# Patient Record
Sex: FEMALE | Race: WHITE | HISPANIC OR LATINO | Employment: OTHER | URBAN - METROPOLITAN AREA
[De-identification: names, ages, dates, MRNs, and addresses within clinical notes are randomized per-mention and may not be internally consistent; named-entity substitution may affect disease eponyms.]

---

## 2020-08-05 ENCOUNTER — OFFICE VISIT (OUTPATIENT)
Dept: FAMILY MEDICINE CLINIC | Facility: CLINIC | Age: 45
End: 2020-08-05

## 2020-08-05 VITALS
OXYGEN SATURATION: 98 % | DIASTOLIC BLOOD PRESSURE: 82 MMHG | HEART RATE: 63 BPM | TEMPERATURE: 98 F | WEIGHT: 207 LBS | SYSTOLIC BLOOD PRESSURE: 140 MMHG | BODY MASS INDEX: 34.49 KG/M2 | HEIGHT: 65 IN

## 2020-08-05 DIAGNOSIS — Z78.0 POSTMENOPAUSAL: Primary | ICD-10-CM

## 2020-08-05 DIAGNOSIS — Z12.31 VISIT FOR SCREENING MAMMOGRAM: ICD-10-CM

## 2020-08-05 DIAGNOSIS — E55.9 VITAMIN D DEFICIENCY: ICD-10-CM

## 2020-08-05 DIAGNOSIS — L71.9 ROSACEA: ICD-10-CM

## 2020-08-05 DIAGNOSIS — N30.30 TRIGONITIS: ICD-10-CM

## 2020-08-05 DIAGNOSIS — L85.3 DRY SKIN DERMATITIS: ICD-10-CM

## 2020-08-05 PROCEDURE — 99204 OFFICE O/P NEW MOD 45 MIN: CPT | Performed by: FAMILY MEDICINE

## 2020-08-05 RX ORDER — CLOTRIMAZOLE AND BETAMETHASONE DIPROPIONATE 10; .64 MG/G; MG/G
CREAM TOPICAL 2 TIMES DAILY
Qty: 15 G | Refills: 5
Start: 2020-08-05 | End: 2021-03-26 | Stop reason: HOSPADM

## 2020-08-05 NOTE — PROGRESS NOTES
Subjective   Ernestina Urrutia is a 45 y.o. female.     Chief Complaint   Patient presents with   • Establish Care       History of Present Illness   Was found to have itching on her face and redness and saw derm and given a steroid cream and has to use prn and doing well.     Vit d def- has taken replacement.     Postmenopausal-patient has had a total hysterectomy.  Has had a recent mammogram that was okay but will be due again in 2 months.  Is taking medication and her symptoms are well controlled.    Trigonitis- has seen urology and not bothering her.  Cystoscopy was okay.    The following portions of the patient's history were reviewed and updated as appropriate: allergies, current medications, past family history, past medical history, past social history, past surgical history and problem list.    No past medical history on file.    Past Surgical History:   Procedure Laterality Date   • BILATERAL OOPHORECTOMY     • CHOLECYSTECTOMY     • CYSTOSCOPY     • HYSTERECTOMY         Family History   Problem Relation Age of Onset   • Heart disease Mother    • Hypertension Mother    • Hyperlipidemia Mother        Social History     Socioeconomic History   • Marital status:      Spouse name: Not on file   • Number of children: Not on file   • Years of education: Not on file   • Highest education level: Not on file   Tobacco Use   • Smoking status: Never Smoker   • Smokeless tobacco: Never Used       Review of Systems   Constitutional: Negative for fatigue and fever.   HENT: Negative for ear pain and hearing loss.    Eyes: Negative for pain and visual disturbance.   Respiratory: Negative for chest tightness and shortness of breath.    Cardiovascular: Negative for chest pain and palpitations.   Gastrointestinal: Negative for constipation and diarrhea.   Genitourinary: Negative for dysuria and urinary incontinence.   Musculoskeletal: Negative for arthralgias and myalgias.   Skin: Negative for rash and skin lesions.  "  Neurological: Negative for headache and memory problem.   Psychiatric/Behavioral: Negative for depressed mood. The patient is not nervous/anxious.        Objective   Visit Vitals  /82   Pulse 63   Temp 98 °F (36.7 °C) (Temporal)   Ht 165.1 cm (65\")   Wt 93.9 kg (207 lb)   SpO2 98%   BMI 34.45 kg/m²     Body mass index is 34.45 kg/m².  Physical Exam   Constitutional: She is oriented to person, place, and time. She appears well-developed. No distress.   HENT:   Head: Normocephalic and atraumatic.   Eyes: Pupils are equal, round, and reactive to light. Conjunctivae are normal.   Neck: Normal range of motion. Neck supple.   Cardiovascular: Normal rate, regular rhythm, normal heart sounds and intact distal pulses.   Pulmonary/Chest: Effort normal and breath sounds normal.   Abdominal: Soft. Bowel sounds are normal.   Musculoskeletal: Normal range of motion. She exhibits no edema.   Neurological: She is alert and oriented to person, place, and time.   Skin: Skin is warm and dry. No rash noted.   Psychiatric: She has a normal mood and affect. Her behavior is normal.         Assessment/Plan   Ernestina was seen today for establish care.    Diagnoses and all orders for this visit:    Postmenopausal  -     Estradiol (Estrogel) 0.75 MG/1.25 GM (0.06%) topical gel; Place 1.25 g on the skin as directed by provider Daily.  -     progesterone (PROMETRIUM) 100 MG capsule; Take 1 capsule by mouth Daily.    Visit for screening mammogram  -     Cancel: Mammo Screening Bilateral With CAD; Future  -     Mammo Screening Bilateral With CAD; Future    Trigonitis    Dry skin dermatitis    Rosacea  -     clotrimazole-betamethasone (Lotrisone) 1-0.05 % cream; Apply  topically to the appropriate area as directed 2 (Two) Times a Day.    Vitamin D deficiency        Doing well, cont meds, f/u in 6-12 months.        "

## 2020-08-06 ENCOUNTER — TELEPHONE (OUTPATIENT)
Dept: FAMILY MEDICINE CLINIC | Facility: CLINIC | Age: 45
End: 2020-08-06

## 2020-08-06 NOTE — TELEPHONE ENCOUNTER
Pat w/ [facility] in [place] called and stated they received a rx on yesterday that did not have a signature and need additional refills. The medication is the Estradiol (Estrogel) 0.75 MG/1.25 GM (0.06%) topical gel.    Pat can be reached at the following number [phone] ext. [phone].

## 2020-08-07 DIAGNOSIS — Z78.0 POSTMENOPAUSAL: ICD-10-CM

## 2020-11-03 ENCOUNTER — HOSPITAL ENCOUNTER (OUTPATIENT)
Dept: MAMMOGRAPHY | Facility: HOSPITAL | Age: 45
Discharge: HOME | End: 2020-11-03
Admitting: FAMILY MEDICINE

## 2020-11-03 DIAGNOSIS — Z12.31 VISIT FOR SCREENING MAMMOGRAM: ICD-10-CM

## 2020-11-03 PROCEDURE — 77067 SCR MAMMO BI INCL CAD: CPT

## 2020-11-03 PROCEDURE — 77063 BREAST TOMOSYNTHESIS BI: CPT

## 2020-11-09 ENCOUNTER — TELEPHONE (OUTPATIENT)
Dept: FAMILY MEDICINE CLINIC | Facility: CLINIC | Age: 45
End: 2020-11-09

## 2020-11-09 NOTE — TELEPHONE ENCOUNTER
PT CALLED STATING SHE NOTICED SOME DISCHARGE FROM HER LEFT BREAST THIS MORNING. SHE STATES SHE HAS NEVER EXPERIENCED THIS BEFORE. SHE STATES THERE WAS ALSO A LITTLE BIT OF ITCHING.     SHE HAD A MAMMOGRAM LAST WEEK THAT CAME BACK NORMAL, BUT SHE WANTS TO KNOW IF SHE SHOULD BE CONCERNED ABOUT THIS, AND IF SHE NEEDS AN APPOINTMENT.    PLEASE ADVISE.    CALLBACK NUMBER: [phone]

## 2020-11-17 ENCOUNTER — OFFICE VISIT (OUTPATIENT)
Dept: FAMILY MEDICINE CLINIC | Facility: CLINIC | Age: 45
End: 2020-11-17

## 2020-11-17 VITALS
BODY MASS INDEX: 35.02 KG/M2 | TEMPERATURE: 97.5 F | DIASTOLIC BLOOD PRESSURE: 70 MMHG | SYSTOLIC BLOOD PRESSURE: 118 MMHG | HEART RATE: 75 BPM | OXYGEN SATURATION: 98 % | HEIGHT: 65 IN | WEIGHT: 210.2 LBS

## 2020-11-17 DIAGNOSIS — N64.52 DISCHARGE FROM LEFT NIPPLE: Primary | ICD-10-CM

## 2020-11-17 PROCEDURE — 99213 OFFICE O/P EST LOW 20 MIN: CPT | Performed by: NURSE PRACTITIONER

## 2020-11-17 NOTE — PROGRESS NOTES
"Michel Urrutia is a 45 y.o. female.     Chief Complaint   Patient presents with   • nipple discharge from left breast on 11/09/20 with itching     This is my first time seeing this patient.   Breast Problem  This is a new problem. The current episode started 1 to 4 weeks ago. Pertinent negatives include no fatigue, fever or rash. Nothing aggravates the symptoms. She has tried nothing for the symptoms.   Patient had normal screening mammogram on 11/3/2020. Patient has had total hysterectomy and is currently taking  Estradiol and progesterone.       The following portions of the patient's history were reviewed and updated as appropriate: allergies, current medications, past family history, past medical history, past social history, past surgical history and problem list.    History reviewed. No pertinent past medical history.    Past Surgical History:   Procedure Laterality Date   • BILATERAL OOPHORECTOMY     • CHOLECYSTECTOMY     • CYSTOSCOPY     • HYSTERECTOMY         Family History   Problem Relation Age of Onset   • Heart disease Mother    • Hypertension Mother    • Hyperlipidemia Mother        Social History     Socioeconomic History   • Marital status:      Spouse name: Not on file   • Number of children: Not on file   • Years of education: Not on file   • Highest education level: Not on file   Tobacco Use   • Smoking status: Never Smoker   • Smokeless tobacco: Never Used   Substance and Sexual Activity   • Alcohol use: Never     Frequency: Never   • Drug use: Never       Review of Systems   Constitutional: Negative for fatigue and fever.   Genitourinary: Positive for breast discharge. Negative for breast lump and breast pain.   Skin: Negative for rash.       Objective   Vitals:    11/17/20 1311   BP: 118/70   Pulse: 75   Temp: 97.5 °F (36.4 °C)   TempSrc: Temporal   SpO2: 98%   Weight: 95.3 kg (210 lb 3.2 oz)   Height: 165.1 cm (65\")      Body mass index is 34.98 kg/m².  Physical " Exam  Vitals signs and nursing note reviewed.   Constitutional:       Appearance: Normal appearance.   Cardiovascular:      Rate and Rhythm: Normal rate and regular rhythm.   Pulmonary:      Effort: Pulmonary effort is normal.      Breath sounds: Normal breath sounds.   Chest:      Breasts:         Right: No mass or nipple discharge.         Left: No mass or nipple discharge.   Neurological:      Mental Status: She is alert and oriented to person, place, and time.   Psychiatric:         Mood and Affect: Mood normal.           Assessment/Plan   Diagnoses and all orders for this visit:    1. Discharge from left nipple (Primary)  -     Prolactin  -     TSH Rfx On Abnormal To Free T4  -     US Breast Left Limited; Future  -     Basic metabolic panel

## 2020-11-18 LAB
BUN SERPL-MCNC: 10 MG/DL (ref 6–20)
BUN/CREAT SERPL: 15.9 (ref 7–25)
CALCIUM SERPL-MCNC: 9.6 MG/DL (ref 8.6–10.5)
CHLORIDE SERPL-SCNC: 103 MMOL/L (ref 98–107)
CO2 SERPL-SCNC: 28.1 MMOL/L (ref 22–29)
CREAT SERPL-MCNC: 0.63 MG/DL (ref 0.57–1)
GLUCOSE SERPL-MCNC: 84 MG/DL (ref 65–99)
POTASSIUM SERPL-SCNC: 4 MMOL/L (ref 3.5–5.2)
PROLACTIN SERPL-MCNC: 8.5 NG/ML (ref 4.8–23.3)
SODIUM SERPL-SCNC: 141 MMOL/L (ref 136–145)
TSH SERPL DL<=0.005 MIU/L-ACNC: 1.12 UIU/ML (ref 0.27–4.2)

## 2020-11-23 ENCOUNTER — TELEPHONE (OUTPATIENT)
Dept: FAMILY MEDICINE CLINIC | Facility: CLINIC | Age: 45
End: 2020-11-23

## 2020-11-23 NOTE — TELEPHONE ENCOUNTER
Caller: Ernestina Urrutia    Relationship: Self    Best call back number: [phone]     Caller requesting test results: covid-19 antibody test     What test was performed: covid-19 antibody test     When was the test performed: 11/17/2020    Where was the test performed: our office     Additional notes: n/a

## 2020-12-09 ENCOUNTER — OFFICE VISIT (OUTPATIENT)
Dept: FAMILY MEDICINE CLINIC | Facility: CLINIC | Age: 45
End: 2020-12-09

## 2020-12-09 VITALS
WEIGHT: 207 LBS | DIASTOLIC BLOOD PRESSURE: 80 MMHG | OXYGEN SATURATION: 99 % | HEIGHT: 65 IN | SYSTOLIC BLOOD PRESSURE: 110 MMHG | BODY MASS INDEX: 34.49 KG/M2 | HEART RATE: 74 BPM | TEMPERATURE: 98 F

## 2020-12-09 DIAGNOSIS — J34.89 INTERNAL NASAL LESION: ICD-10-CM

## 2020-12-09 DIAGNOSIS — Z11.59 ENCOUNTER FOR HEPATITIS C SCREENING TEST FOR LOW RISK PATIENT: ICD-10-CM

## 2020-12-09 DIAGNOSIS — L71.9 ROSACEA: ICD-10-CM

## 2020-12-09 DIAGNOSIS — E55.9 VITAMIN D DEFICIENCY: ICD-10-CM

## 2020-12-09 DIAGNOSIS — Z00.00 HEALTH CARE MAINTENANCE: Primary | ICD-10-CM

## 2020-12-09 DIAGNOSIS — Z78.0 POSTMENOPAUSAL: ICD-10-CM

## 2020-12-09 PROCEDURE — 90715 TDAP VACCINE 7 YRS/> IM: CPT | Performed by: FAMILY MEDICINE

## 2020-12-09 PROCEDURE — 90471 IMMUNIZATION ADMIN: CPT | Performed by: FAMILY MEDICINE

## 2020-12-09 PROCEDURE — 99396 PREV VISIT EST AGE 40-64: CPT | Performed by: FAMILY MEDICINE

## 2020-12-09 RX ORDER — ESTRADIOL 0.75 MG/1.25G
2.5 GEL, METERED TOPICAL DAILY
Qty: 1 BOTTLE | Refills: 5 | Status: SHIPPED | OUTPATIENT
Start: 2020-12-09 | End: 2021-03-04

## 2020-12-09 NOTE — PROGRESS NOTES
"Ernestina Urrutia is here today for an annual physical exam.     Pt has a nasal scab for months and not resolving.     Rosacea- cream helps    Eating a healthy diet. Exercising routinely.   hysterectomy. Wears seat belt. Feels safe at home.   Sexual activity: no  Pregnancy: 0  Sexual and gender orientation: heterosexual female    PHQ-2 Depression Screening  Little interest or pleasure in doing things? 0   Feeling down, depressed, or hopeless? 0   PHQ-2 Total Score 0         I have reviewed the patient's medical, family, and social history in detail and updated the computerized patient record.    Screening history:  Colonoscopy -not yet due  Mammogram-had mammogram this year and is pending ultrasound after she noted left nipple discharge.  Labs are normal.  Pap/pelvic - no longer needed  Metabolic -due    Health Maintenance   Topic Date Due   • COLONOSCOPY  1975   • ANNUAL PHYSICAL  04/17/1978   • TDAP/TD VACCINES (1 - Tdap) 04/17/1994   • HEPATITIS C SCREENING  07/29/2020   • INFLUENZA VACCINE  08/01/2020   • MAMMOGRAM  11/03/2022   • Pneumococcal Vaccine 0-64  Aged Out   • PAP SMEAR  Discontinued       Review of Systems   Constitutional: Negative for fever.   HENT: Negative for hearing loss.    Eyes: Negative for visual disturbance.   Respiratory: Negative for shortness of breath.    Cardiovascular: Negative for chest pain.   Gastrointestinal: Negative for constipation and diarrhea.   Genitourinary: Negative for difficulty urinating.   Musculoskeletal: Negative for arthralgias and myalgias.   Skin: Negative for rash.   Hematological: Does not bruise/bleed easily.   Psychiatric/Behavioral: Negative for dysphoric mood.       /80 (BP Location: Left arm, Patient Position: Sitting, Cuff Size: Adult)   Pulse 74   Temp 98 °F (36.7 °C) (Temporal)   Ht 165.1 cm (65\")   Wt 93.9 kg (207 lb)   SpO2 99%   BMI 34.45 kg/m²      Physical Exam    Vital signs reviewed.  General appearance: No acute distress  Eyes: " conjunctiva clear without erythema; pupils equally round and reactive  ENT: external ears and nose normal; hearing normal, oropharynx clear  Neck: supple; no thyromegaly  CV: normal rate and rhythm; no peripheral edema  Respiratory: normal respiratory effort; lungs clear to auscultation bilaterally  MSK: normal gait and station; no focal joint deformity or swelling  Skin: no rash or wounds; normal turgor  Neuro: cranial nerves 2-12 grossly intact; normal sensation to light touch  Psych: mood and affect normal; recent and remote memory intact    No visits with results within 2 Week(s) from this visit.   Latest known visit with results is:   Office Visit on 11/17/2020   Component Date Value Ref Range Status   • Prolactin 11/17/2020 8.5  4.8 - 23.3 ng/mL Final   • TSH 11/17/2020 1.120  0.270 - 4.200 uIU/mL Final   • Glucose 11/17/2020 84  65 - 99 mg/dL Final   • BUN 11/17/2020 10  6 - 20 mg/dL Final   • Creatinine 11/17/2020 0.63  0.57 - 1.00 mg/dL Final   • eGFR Non African Am 11/17/2020 102  >60 mL/min/1.73 Final   • eGFR African Am 11/17/2020 124  >60 mL/min/1.73 Final   • BUN/Creatinine Ratio 11/17/2020 15.9  7.0 - 25.0 Final   • Sodium 11/17/2020 141  136 - 145 mmol/L Final   • Potassium 11/17/2020 4.0  3.5 - 5.2 mmol/L Final   • Chloride 11/17/2020 103  98 - 107 mmol/L Final   • Total CO2 11/17/2020 28.1  22.0 - 29.0 mmol/L Final   • Calcium 11/17/2020 9.6  8.6 - 10.5 mg/dL Final         Current Outpatient Medications:   •  clotrimazole-betamethasone (Lotrisone) 1-0.05 % cream, Apply  topically to the appropriate area as directed 2 (Two) Times a Day., Disp: 15 g, Rfl: 5  •  Estradiol (Estrogel) 0.75 MG/1.25 GM (0.06%) topical gel, Place 2.5 g on the skin as directed by provider Daily., Disp: 1 bottle, Rfl: 5  •  progesterone (PROMETRIUM) 100 MG capsule, Take 1 capsule by mouth Daily., Disp: 90 capsule, Rfl: 3    Diagnoses and all orders for this visit:    1. Health care maintenance (Primary)  -     Lipid  Panel    2. Encounter for hepatitis C screening test for low risk patient  -     Hepatitis C Antibody    3. Postmenopausal  -     Estradiol (Estrogel) 0.75 MG/1.25 GM (0.06%) topical gel; Place 2.5 g on the skin as directed by provider Daily.  Dispense: 1 bottle; Refill: 5    4. Internal nasal lesion  -     Ambulatory Referral to ENT (Otolaryngology)    5. Rosacea    Other orders  -     Cancel: Pap IG, Rfx HPV All Pth  -     Tdap Vaccine Greater Than or Equal To 8yo IM        Encourage healthy diet and exercise.  Encourage patient to stay up to date on screening examinations as indicated based on age and risk factors. F/U yearly.

## 2020-12-10 LAB
25(OH)D3+25(OH)D2 SERPL-MCNC: 23.1 NG/ML (ref 30–100)
CHOLEST SERPL-MCNC: 219 MG/DL (ref 0–200)
HCV AB S/CO SERPL IA: <0.1 S/CO RATIO (ref 0–0.9)
HDLC SERPL-MCNC: 49 MG/DL (ref 40–60)
LDLC SERPL CALC-MCNC: 147 MG/DL (ref 0–100)
TRIGL SERPL-MCNC: 126 MG/DL (ref 0–150)
VLDLC SERPL CALC-MCNC: 23 MG/DL (ref 5–40)

## 2020-12-11 ENCOUNTER — TELEPHONE (OUTPATIENT)
Dept: FAMILY MEDICINE CLINIC | Facility: CLINIC | Age: 45
End: 2020-12-11

## 2020-12-11 RX ORDER — ERGOCALCIFEROL 1.25 MG/1
50000 CAPSULE ORAL WEEKLY
Qty: 8 CAPSULE | Refills: 0 | Status: SHIPPED | OUTPATIENT
Start: 2020-12-11 | End: 2021-03-15

## 2020-12-11 NOTE — TELEPHONE ENCOUNTER
----- Message from Cherelle Schumacher MD sent at 12/11/2020  9:36 AM EST -----  Cholesterol is high.  Please work on diet and exercise and follow-up in 6 months.  We will recheck it and if it is still high we will need to start medication.  Your vitamin D is a little bit low.  I sent in an 8-week course of replacement to your local pharmacy.  After you are finished with this go to taking an over-the-counter vitamin D supplement of 1000 international units daily.

## 2020-12-15 ENCOUNTER — HOSPITAL ENCOUNTER (OUTPATIENT)
Dept: ULTRASOUND IMAGING | Facility: HOSPITAL | Age: 45
Discharge: HOME | End: 2020-12-15

## 2020-12-15 ENCOUNTER — HOSPITAL ENCOUNTER (OUTPATIENT)
Dept: MAMMOGRAPHY | Facility: HOSPITAL | Age: 45
Discharge: HOME | End: 2020-12-15

## 2020-12-15 DIAGNOSIS — N64.52 DISCHARGE FROM LEFT NIPPLE: ICD-10-CM

## 2020-12-15 PROCEDURE — 77065 DX MAMMO INCL CAD UNI: CPT

## 2020-12-15 PROCEDURE — G0279 TOMOSYNTHESIS, MAMMO: HCPCS

## 2020-12-15 PROCEDURE — 76641 ULTRASOUND BREAST COMPLETE: CPT

## 2020-12-16 ENCOUNTER — TELEPHONE (OUTPATIENT)
Dept: FAMILY MEDICINE CLINIC | Facility: CLINIC | Age: 45
End: 2020-12-16

## 2020-12-16 DIAGNOSIS — R92.8 ABNORMAL ULTRASOUND OF BREAST: ICD-10-CM

## 2020-12-16 DIAGNOSIS — N64.52 DISCHARGE FROM LEFT NIPPLE: Primary | ICD-10-CM

## 2020-12-16 NOTE — TELEPHONE ENCOUNTER
Will need to evaluate left breast further with US guided core needle biopsy.    Patient aware of results and recommendations.

## 2021-01-05 ENCOUNTER — HOSPITAL ENCOUNTER (OUTPATIENT)
Dept: MAMMOGRAPHY | Facility: HOSPITAL | Age: 46
Discharge: HOME | End: 2021-01-05

## 2021-01-05 ENCOUNTER — HOSPITAL ENCOUNTER (OUTPATIENT)
Dept: ULTRASOUND IMAGING | Facility: HOSPITAL | Age: 46
Discharge: HOME | End: 2021-01-05

## 2021-01-05 VITALS
BODY MASS INDEX: 34.49 KG/M2 | TEMPERATURE: 97.3 F | WEIGHT: 207 LBS | DIASTOLIC BLOOD PRESSURE: 79 MMHG | HEART RATE: 69 BPM | SYSTOLIC BLOOD PRESSURE: 131 MMHG | HEIGHT: 65 IN | RESPIRATION RATE: 16 BRPM | OXYGEN SATURATION: 100 %

## 2021-01-05 DIAGNOSIS — N64.52 DISCHARGE FROM LEFT NIPPLE: ICD-10-CM

## 2021-01-05 DIAGNOSIS — R92.8 ABNORMAL ULTRASOUND OF BREAST: ICD-10-CM

## 2021-01-05 DIAGNOSIS — Z98.890 STATUS POST BIOPSY: ICD-10-CM

## 2021-01-05 PROCEDURE — 25010000003 LIDOCAINE 1 % SOLUTION: Performed by: RADIOLOGY

## 2021-01-05 PROCEDURE — 77065 DX MAMMO INCL CAD UNI: CPT

## 2021-01-05 PROCEDURE — A4648 IMPLANTABLE TISSUE MARKER: HCPCS

## 2021-01-05 PROCEDURE — 88342 IMHCHEM/IMCYTCHM 1ST ANTB: CPT | Performed by: NURSE PRACTITIONER

## 2021-01-05 PROCEDURE — 88360 TUMOR IMMUNOHISTOCHEM/MANUAL: CPT | Performed by: NURSE PRACTITIONER

## 2021-01-05 PROCEDURE — 88305 TISSUE EXAM BY PATHOLOGIST: CPT | Performed by: NURSE PRACTITIONER

## 2021-01-05 RX ORDER — LIDOCAINE HYDROCHLORIDE AND EPINEPHRINE 10; 10 MG/ML; UG/ML
10 INJECTION, SOLUTION INFILTRATION; PERINEURAL ONCE
Status: COMPLETED | OUTPATIENT
Start: 2021-01-05 | End: 2021-01-05

## 2021-01-05 RX ORDER — LIDOCAINE HYDROCHLORIDE 10 MG/ML
10 INJECTION, SOLUTION INFILTRATION; PERINEURAL ONCE
Status: COMPLETED | OUTPATIENT
Start: 2021-01-05 | End: 2021-01-05

## 2021-01-05 RX ADMIN — LIDOCAINE HYDROCHLORIDE,EPINEPHRINE BITARTRATE 5 ML: 10; .01 INJECTION, SOLUTION INFILTRATION; PERINEURAL at 16:04

## 2021-01-05 RX ADMIN — LIDOCAINE HYDROCHLORIDE,EPINEPHRINE BITARTRATE 10 ML: 10; .01 INJECTION, SOLUTION INFILTRATION; PERINEURAL at 16:03

## 2021-01-05 RX ADMIN — LIDOCAINE HYDROCHLORIDE 3 ML: 10 INJECTION, SOLUTION INFILTRATION; PERINEURAL at 16:03

## 2021-01-05 NOTE — NURSING NOTE
VSS. Denies pain. Medical/surgical history and medications reviewed. No distress noted. Procedural education completed with patient's questions addressed. Goggles and mask in place by nurse with all interaction. Pt wore mask.

## 2021-01-05 NOTE — NURSING NOTE
Biopsy site to medial and lateral left breast, clear with Skin Affix dry and intact. No firmness or swelling noted at or around biopsy site. Denies pain. Ice pack with protective covering applied to biopsy site. Discharge instructions discussed with understanding voiced by patient. Copies provided to patient. No distress noted. To home via private vehicle.

## 2021-01-05 NOTE — H&P
Name: Ernestina Urrutia ADMIT: 2021   : 1975  PCP: Cherelle Schumacher MD    MRN: [id number] LOS: 0 days   AGE/SEX: 45 y.o. female  ROOM: Room/bed info not found       Chief complaint left breast mass    Present Illness or Internal History:  Patient is a 45 y.o. female presents with left breast mass for us bx.     Past Surgical History:  Past Surgical History:   Procedure Laterality Date   • BILATERAL OOPHORECTOMY     • CHOLECYSTECTOMY     • CYSTOSCOPY     • HYSTERECTOMY     • OOPHORECTOMY         Past Medical History:  Past Medical History:   Diagnosis Date   • Breast cyst    • Fibrocystic breast        Home Medications:  (Not in a hospital admission)      Allergies:  Patient has no known allergies.    Family History:  Family History   Problem Relation Age of Onset   • Heart disease Mother    • Hypertension Mother    • Hyperlipidemia Mother        Social History:  Social History     Tobacco Use   • Smoking status: Never Smoker   • Smokeless tobacco: Never Used   Substance Use Topics   • Alcohol use: Never     Frequency: Never   • Drug use: Never        Objective     Physical Exam:    No exam performed today,    Vital Signs  Temp:  [97.3 °F (36.3 °C)] 97.3 °F (36.3 °C)  Heart Rate:  [74] 74  Resp:  [16] 16  BP: (137)/(79) 137/79    Anticipated Surgical Procedure:  Left breast ultrasound guided core needle biopsy    The risks, benefits and alternatives of this procedure have been discussed with the patient or responsible party: Yes        Lorraine Peraza MD  21  15:02 EST

## 2021-01-06 ENCOUNTER — TELEPHONE (OUTPATIENT)
Dept: MAMMOGRAPHY | Facility: HOSPITAL | Age: 46
End: 2021-01-06

## 2021-01-06 NOTE — TELEPHONE ENCOUNTER
Pt states she is using ice pack and taking tylenol. No further complaints. Pt states she is slightly swollen. Encg'd patient to call back for any further issues

## 2021-01-08 ENCOUNTER — TELEPHONE (OUTPATIENT)
Dept: FAMILY MEDICINE CLINIC | Facility: CLINIC | Age: 46
End: 2021-01-08

## 2021-01-08 NOTE — TELEPHONE ENCOUNTER
Patient called and would like a call back to go over her test results from 1/5 on her breast. Please call [phone].

## 2021-01-10 ENCOUNTER — TELEPHONE (OUTPATIENT)
Dept: FAMILY MEDICINE CLINIC | Facility: CLINIC | Age: 46
End: 2021-01-10

## 2021-01-10 DIAGNOSIS — N64.52 DISCHARGE FROM LEFT NIPPLE: Primary | ICD-10-CM

## 2021-01-10 DIAGNOSIS — R92.8 ABNORMAL ULTRASOUND OF BREAST: ICD-10-CM

## 2021-01-11 NOTE — TELEPHONE ENCOUNTER
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    Patient aware of results and recommendations.

## 2021-01-13 ENCOUNTER — TELEPHONE (OUTPATIENT)
Dept: SURGERY | Facility: CLINIC | Age: 46
End: 2021-01-13

## 2021-01-14 ENCOUNTER — TELEPHONE (OUTPATIENT)
Dept: SURGERY | Facility: CLINIC | Age: 46
End: 2021-01-14

## 2021-01-14 DIAGNOSIS — N60.99 ATYPICAL DUCTAL HYPERPLASIA OF BREAST: Primary | ICD-10-CM

## 2021-01-14 NOTE — TELEPHONE ENCOUNTER
Patient calls reporting new lump and pain under right nipple x 4 days. Area is tender to touch and feels swollen. No redness, no discoloration, no nipple discharge.     New patient apt scheduled with Dr. Washington 1/29/21 for left breast ADH borderline DCIS.

## 2021-01-15 ENCOUNTER — TELEPHONE (OUTPATIENT)
Dept: SURGERY | Facility: CLINIC | Age: 46
End: 2021-01-15

## 2021-01-15 DIAGNOSIS — N63.10 BREAST MASS, RIGHT: Primary | ICD-10-CM

## 2021-01-25 ENCOUNTER — HOSPITAL ENCOUNTER (OUTPATIENT)
Dept: MRI IMAGING | Facility: HOSPITAL | Age: 46
Discharge: HOME | End: 2021-01-25

## 2021-01-25 ENCOUNTER — HOSPITAL ENCOUNTER (OUTPATIENT)
Dept: ULTRASOUND IMAGING | Facility: HOSPITAL | Age: 46
Discharge: HOME | End: 2021-01-25

## 2021-01-25 ENCOUNTER — HOSPITAL ENCOUNTER (OUTPATIENT)
Dept: MAMMOGRAPHY | Facility: HOSPITAL | Age: 46
Discharge: HOME | End: 2021-01-25

## 2021-01-25 DIAGNOSIS — N60.99 ATYPICAL DUCTAL HYPERPLASIA OF BREAST: ICD-10-CM

## 2021-01-25 DIAGNOSIS — N63.10 BREAST MASS, RIGHT: ICD-10-CM

## 2021-01-25 PROCEDURE — 77049 MRI BREAST C-+ W/CAD BI: CPT

## 2021-01-25 PROCEDURE — 77065 DX MAMMO INCL CAD UNI: CPT

## 2021-01-25 PROCEDURE — 76642 ULTRASOUND BREAST LIMITED: CPT

## 2021-01-25 PROCEDURE — 0 GADOBENATE DIMEGLUMINE 529 MG/ML SOLUTION: Performed by: SURGERY

## 2021-01-25 PROCEDURE — A9577 INJ MULTIHANCE: HCPCS | Performed by: SURGERY

## 2021-01-25 PROCEDURE — G0279 TOMOSYNTHESIS, MAMMO: HCPCS

## 2021-01-25 RX ADMIN — GADOBENATE DIMEGLUMINE 19 ML: 529 INJECTION, SOLUTION INTRAVENOUS at 08:39

## 2021-01-27 ENCOUNTER — TRANSCRIBE ORDERS (OUTPATIENT)
Dept: SURGERY | Facility: CLINIC | Age: 46
End: 2021-01-27

## 2021-01-27 ENCOUNTER — TELEPHONE (OUTPATIENT)
Dept: SURGERY | Facility: CLINIC | Age: 46
End: 2021-01-27

## 2021-01-27 DIAGNOSIS — Z98.890 STATUS POST LEFT BREAST BIOPSY: Primary | ICD-10-CM

## 2021-01-27 NOTE — TELEPHONE ENCOUNTER
Scheduled Left Breast Mri guided breast biopsy with Luh in mammogram- 2-9-21 arrive 6:30am    Pt will see Dr DOE 2-12-21 arrive 7:30    Spoke to pt she v/arlyn Hart

## 2021-01-27 NOTE — TELEPHONE ENCOUNTER
I let her know that in the right breast what she was feeling is a cyst.  We can aspirate that for her if she would like on her day of visit, confirmed with patient.     I also let her know that the radiologist has recommended a biopsy on the left side at a different location from her first biopsy. She understands that we will schedule and see her back after.

## 2021-01-29 RX ORDER — ERGOCALCIFEROL 1.25 MG/1
50000 CAPSULE ORAL WEEKLY
Qty: 8 CAPSULE | Refills: 0 | OUTPATIENT
Start: 2021-01-29

## 2021-02-09 ENCOUNTER — HOSPITAL ENCOUNTER (OUTPATIENT)
Dept: MRI IMAGING | Facility: HOSPITAL | Age: 46
Discharge: HOME | End: 2021-02-09

## 2021-02-09 ENCOUNTER — HOSPITAL ENCOUNTER (OUTPATIENT)
Dept: MAMMOGRAPHY | Facility: HOSPITAL | Age: 46
Discharge: HOME | End: 2021-02-09

## 2021-02-09 VITALS
RESPIRATION RATE: 16 BRPM | TEMPERATURE: 97.3 F | OXYGEN SATURATION: 100 % | DIASTOLIC BLOOD PRESSURE: 83 MMHG | WEIGHT: 208 LBS | HEART RATE: 66 BPM | HEIGHT: 65 IN | BODY MASS INDEX: 34.66 KG/M2 | SYSTOLIC BLOOD PRESSURE: 148 MMHG

## 2021-02-09 DIAGNOSIS — Z98.890 STATUS POST LEFT BREAST BIOPSY: ICD-10-CM

## 2021-02-09 DIAGNOSIS — R92.8 ABNORMAL FINDING ON BREAST IMAGING: ICD-10-CM

## 2021-02-09 LAB — CREAT BLDA-MCNC: 0.7 MG/DL (ref 0.6–1.3)

## 2021-02-09 PROCEDURE — 82565 ASSAY OF CREATININE: CPT

## 2021-02-09 PROCEDURE — A9577 INJ MULTIHANCE: HCPCS | Performed by: SURGERY

## 2021-02-09 PROCEDURE — 0 GADOBENATE DIMEGLUMINE 529 MG/ML SOLUTION: Performed by: SURGERY

## 2021-02-09 PROCEDURE — 77065 DX MAMMO INCL CAD UNI: CPT

## 2021-02-09 PROCEDURE — 88305 TISSUE EXAM BY PATHOLOGIST: CPT | Performed by: SURGERY

## 2021-02-09 PROCEDURE — 25010000003 LIDOCAINE 1 % SOLUTION: Performed by: SURGERY

## 2021-02-09 PROCEDURE — A4648 IMPLANTABLE TISSUE MARKER: HCPCS

## 2021-02-09 RX ORDER — LIDOCAINE HYDROCHLORIDE 10 MG/ML
1 INJECTION, SOLUTION INFILTRATION; PERINEURAL ONCE
Status: COMPLETED | OUTPATIENT
Start: 2021-02-09 | End: 2021-02-09

## 2021-02-09 RX ADMIN — LIDOCAINE HYDROCHLORIDE 1 ML: 10; .005 INJECTION, SOLUTION EPIDURAL; INFILTRATION; INTRACAUDAL; PERINEURAL at 08:44

## 2021-02-09 RX ADMIN — LIDOCAINE HYDROCHLORIDE 1 ML: 10 INJECTION, SOLUTION INFILTRATION; PERINEURAL at 08:43

## 2021-02-09 RX ADMIN — GADOBENATE DIMEGLUMINE 19 ML: 529 INJECTION, SOLUTION INTRAVENOUS at 08:25

## 2021-02-09 NOTE — NURSING NOTE
Biopsy site to left breast clear with Dermabond dry and intact. No firmness or swelling noted at or around biopsy site. Denies pain. Ice pack with protective covering applied to biopsy site. Bruising is noted. Discharge instructions discussed with understanding voiced by patient. Copies provided to patient. No distress noted. To home via private vehicle

## 2021-02-09 NOTE — H&P
Name: Ernestina Urrutia ADMIT: 2021   : 1975  PCP: Cherelle Schumacher MD    MRN: [id number] LOS: 0 days   AGE/SEX: 45 y.o. female  ROOM: Room/bed info not found       Chief complaint left breast enhancement    Present Illness or Internal History:  Patient is a 45 y.o. female presents with L breast enhancement for MRI biopsy.     Past Surgical History:  Past Surgical History:   Procedure Laterality Date   • BILATERAL OOPHORECTOMY     • CHOLECYSTECTOMY     • CYSTOSCOPY     • HYSTERECTOMY     • OOPHORECTOMY         Past Medical History:  Past Medical History:   Diagnosis Date   • Breast cyst    • Fibrocystic breast        Home Medications:  (Not in a hospital admission)      Allergies:  Patient has no known allergies.    Family History:  Family History   Problem Relation Age of Onset   • Heart disease Mother    • Hypertension Mother    • Hyperlipidemia Mother        Social History:  Social History     Tobacco Use   • Smoking status: Never Smoker   • Smokeless tobacco: Never Used   Substance Use Topics   • Alcohol use: Never     Frequency: Never   • Drug use: Never        Objective     Physical Exam:    No exam performed today,    Vital Signs  Temp:  [97.3 °F (36.3 °C)] 97.3 °F (36.3 °C)  Heart Rate:  [73] 73  Resp:  [16] 16  BP: (128)/(80) 128/80    Anticipated Surgical Procedure:  Left breast MRI guided core needle biopsy    The risks, benefits and alternatives of this procedure have been discussed with the patient or responsible party: Yes        Lorraine Peraza MD  21  07:48 EST

## 2021-02-10 LAB
LAB AP CASE REPORT: NORMAL
LAB AP DIAGNOSIS COMMENT: NORMAL
PATH REPORT.FINAL DX SPEC: NORMAL
PATH REPORT.GROSS SPEC: NORMAL

## 2021-02-12 ENCOUNTER — TELEPHONE (OUTPATIENT)
Dept: SURGERY | Facility: CLINIC | Age: 46
End: 2021-02-12

## 2021-02-12 ENCOUNTER — PREP FOR SURGERY (OUTPATIENT)
Dept: OTHER | Facility: HOSPITAL | Age: 46
End: 2021-02-12

## 2021-02-12 ENCOUNTER — TRANSCRIBE ORDERS (OUTPATIENT)
Dept: SURGERY | Facility: CLINIC | Age: 46
End: 2021-02-12

## 2021-02-12 ENCOUNTER — HOSPITAL ENCOUNTER (OUTPATIENT)
Dept: SURGERY | Facility: HOSPITAL | Age: 46
Discharge: HOME | End: 2021-02-12

## 2021-02-12 ENCOUNTER — OFFICE VISIT (OUTPATIENT)
Dept: SURGERY | Facility: CLINIC | Age: 46
End: 2021-02-12

## 2021-02-12 VITALS
HEIGHT: 65 IN | OXYGEN SATURATION: 98 % | BODY MASS INDEX: 35.82 KG/M2 | DIASTOLIC BLOOD PRESSURE: 80 MMHG | WEIGHT: 215 LBS | SYSTOLIC BLOOD PRESSURE: 130 MMHG | TEMPERATURE: 98 F | HEART RATE: 80 BPM

## 2021-02-12 DIAGNOSIS — N64.89 BREAST ASYMMETRY: ICD-10-CM

## 2021-02-12 DIAGNOSIS — D24.2 PAPILLOMA OF LEFT BREAST: ICD-10-CM

## 2021-02-12 DIAGNOSIS — D05.12 DUCTAL CARCINOMA IN SITU (DCIS) OF LEFT BREAST: ICD-10-CM

## 2021-02-12 DIAGNOSIS — R92.8 ABNORMAL MRI, BREAST: ICD-10-CM

## 2021-02-12 DIAGNOSIS — N63.10 BREAST MASS, RIGHT: ICD-10-CM

## 2021-02-12 DIAGNOSIS — N60.99 ATYPICAL DUCTAL HYPERPLASIA OF BREAST: ICD-10-CM

## 2021-02-12 DIAGNOSIS — T14.8XXA HEMATOMA: ICD-10-CM

## 2021-02-12 DIAGNOSIS — Z79.890 HORMONE REPLACEMENT THERAPY (POSTMENOPAUSAL): ICD-10-CM

## 2021-02-12 DIAGNOSIS — N60.92 ATYPICAL DUCTAL HYPERPLASIA OF LEFT BREAST: Primary | ICD-10-CM

## 2021-02-12 DIAGNOSIS — N60.01 SOLITARY CYST OF RIGHT BREAST: Primary | ICD-10-CM

## 2021-02-12 DIAGNOSIS — Z78.9 FAMILY HISTORY UNKNOWN: ICD-10-CM

## 2021-02-12 PROCEDURE — 10160 PNXR ASPIR ABSC HMTMA BULLA: CPT | Performed by: SURGERY

## 2021-02-12 PROCEDURE — 99244 OFF/OP CNSLTJ NEW/EST MOD 40: CPT | Performed by: SURGERY

## 2021-02-12 PROCEDURE — 76942 ECHO GUIDE FOR BIOPSY: CPT | Performed by: SURGERY

## 2021-02-12 RX ORDER — DIAZEPAM 5 MG/1
10 TABLET ORAL ONCE
Status: CANCELLED | OUTPATIENT
Start: 2021-03-25 | End: 2021-02-12

## 2021-02-12 RX ORDER — SODIUM CHLORIDE, SODIUM LACTATE, POTASSIUM CHLORIDE, CALCIUM CHLORIDE 600; 310; 30; 20 MG/100ML; MG/100ML; MG/100ML; MG/100ML
100 INJECTION, SOLUTION INTRAVENOUS CONTINUOUS
Status: CANCELLED | OUTPATIENT
Start: 2021-03-25

## 2021-02-12 RX ORDER — CEFAZOLIN SODIUM 2 G/100ML
2 INJECTION, SOLUTION INTRAVENOUS ONCE
Status: CANCELLED | OUTPATIENT
Start: 2021-03-25 | End: 2021-02-12

## 2021-02-12 NOTE — TELEPHONE ENCOUNTER
Spoke to Thuy at , he is available for surgery on 3-25-21.    Spoke to Mind[facility] and faxed records over for them to get patient scheduled with .    Scheduled Surgery Main Or 3-25-21  1. Left Breast Needle Localized Lumpectomy 6:00  2. Left Breast Needle Localized Lumpectomy 9:00 Papilloma  3. Possible Oncoplastic     Arrive  5:15  NL       7:30  NL        8:30  Surgery 10:00    [facility] 3-15-21 at 10:00    Return to see Dr DOE 4-8-21 arrive 11:15    LM for patient to call me.  Hailey    Pt called and Confirmed her surgery details.  Hailey

## 2021-02-12 NOTE — TELEPHONE ENCOUNTER
Path consult request to Dr. Cristopher Rasmussen 1/5/2021 specimen. Faxed and confirmed with path lab.     Blood draw for [facility] genetic stat panel via [facility]. Patient tolerated well.

## 2021-02-12 NOTE — PROGRESS NOTES
Chief Complaint: Ernestina Osuna is a 45 y.o. female who was seen in consultation at the request of ELISEO Chong  for ADH, abnormal breast imaging and newly diagnosed DCIS    History of Present Illness:  Patient presents with ADH/DCIS and abnormal imaging and breast mass.     She had a regular screening mammogram in November.  After her mammogram she noticed a single episode of nipple drainage from her left nipple when she reached down to scratch her left breast.  She only saw this 1 time and says that it was clear.  She then sought imaging for this as below.  After having her initial set of images, she noticed a fairly rapid onset burning tenderness to the right nipple area and behind the nipple.  For this we also did diagnostic imaging below.  On the left severe ADH bordering on duct carcinoma in situ was identified at 6:00 and a papilloma identified on MRI at 9:00 to account for her nipple drainage.  On the right at the site of the mass there was a 2 cm simple cyst.  As below.      She noted no new masses, skin changes,other  nipple changes prior to her most recent imaging.  Her most recent imaging includes the followin2020 BILATERAL SCREENING MAMMO WITH PAULA  [facility]        ERNESTINA OSUNA  Heterogeneously dense.  BI-RADS Category 2: Benign.    12/15/2020 LEFT DIAGNOSTIC MMG WITH PAULA & LEFT BREAST US        [facility]       ERNESTINA OSUNA  Single episode of spontaneous clear left nipple discharge. She states her left breast feels like it is on fire.  MMG:  Heterogeneously dense. There are multiple masses and/or ducts in the retroareolar left breast, which are located within the densest area of breast parenchyma.  US:  Left ultrasound, 6:00, 1 cm from the nipple, 0.5 x 0.4 x 0.5 cm complicated cyst versus solid mass, may be contiguous with a duct. Otherwise, numerous benign-appearing cysts and ducts are identified.  Complicated cyst versus solid mass at 6:00 in the left breast, evaluation with  ultrasound-guided core needle biopsy is recommended.  BI-RADS Category 4: Suspicious.      She had a biopsy on the following day that showed:   01/05/2021 LEFT DIAGNOSTIC MAMMO & US GUIDED BREAST BIOPSY      [facility] O[facility]  6:00, 1 cm from the nipple in the left breast, 11 g needle. 11 core specimens were obtained. A bowtie clip was then deployed. Bowtie clip at the expected location in the lower central anterior left breast.  Pathology demonstrates severe atypical ductal hyperplasia bordering on low-grade DCIS, which is concordant.  1. 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  Sections demonstrate and area of possible cyst wall/dilated duct with associated apocrine cysts and proliferative breast changes. Multiple ducts demonstrate areas of micropapillary atypia, which by IHC qualify as at least ADH. Definitive diagnosis is best deferred to the excision.    I then arranged for a MRI:    01/25/2021 BILATERAL BREAST MRI         [facility]           ALFREDO OSUNA  Subareolar right breast, 2.1 cm cyst, which corresponds to the palpable lump.  LEFT BREAST:   6:00, 3.6 cm posterior to the nipple, susceptibility from a biopsy clip, which marks the site of biopsy-proven atypia. No suspicious mass enhancement at this location.   9:00, 3.9 cm posterior to the nipple, there is a 1.2 cm focal area of clumped nonmass enhancement.  IMPRESSION AND RECOMMENDATION:  9:00, left breast. Further evaluation with MRI guided core needle biopsy is recommended.  BI-RADS Category 4: Suspicious.    01/25/2021 RIGHT DIAGNOSTIC MMG WITH PAULA & RIGHT BREAST US    [facility]  CLINICAL INDICATION: Right breast pain and hardening around the right nipple.  MMG:  Heterogeneously dense. There are no suspicious masses, calcifications, or areas of architectural distortion.  US:  Retroareolar right breast, area of concern,  6:00 there is a palpable 2.0 benign-appearing complicated cyst.  IMPRESSION:  BI-RADS Category 2: Benign.    I then arranged for a MRI guided biopsy:  02/10/2021 MRI BREAST BIOPSY           [facility]   ALFREDO ALEJOAZAS  An 8 gauge Mammotome. 14 core samples were obtained. A U-shaped clip.   Post-procedural mammographic views of the left breast demonstrate the U-shaped clip at the expected location in the inner central middle to anterior left breast, approximately 3.1 cm medial to the bowtie-shaped biopsy clip at the site of biopsy-proven atypia bordering on DCIS.  Pathology is high risk and concordant with the imaging assessment.  PATHOLOGY:  1. Left Breast, 9:00 o’clock, MRI-Guided Biopsies:  A. Proliferative breast parenchyma with sclerotic intraductal papillomas with florid duct hyperplasia, columnar cell hyperplasia and apocrine cysts.  B. Focal microcalcifications associated with columnar cell hyperplasia.  C. No atypical hyperplasia, in-situ nor invasive carcinoma identified.        She had not had a breast biopsy in the past.  She has had her uterus and ovaries removed, is postmenopausal, and has taken combination HRT since 2018  Her family history includes the following: her family is abroad and she does not know her FH.  She is here for evaluation.    Review of Systems:  Review of Systems   Cardiovascular: Positive for palpitations (PALPITATIONS AT NIGHT , BUT NOT OFTEN, CLEARED BY CARDIOLOGY IN [place] AFTER ECHO AND HOLTER MONITOR).   Musculoskeletal: Positive for arthralgias (LEFT KNEE PAIN ).   All other systems reviewed and are negative.       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 2/10/21 BENIGN.    Breast Cancer HIstory:  Patient does not have a past medical history of breast cancer.  Breast Operations, and year:  NONE  "  Obstetric/Gynecologic History:  Age menstrual periods began: 12  Patient is postmenopausal due to removal of her uterus in the following year: 43   Number of pregnancies:0  Number of live births: 0  Number of abortions or miscarriages: 0  Age of delivery of first child: N/A   BREAST FEEDING: N/A   Length of time taking birth control pills: 2 YRS   Patient is presently taking the following hormone replacement: ESTROGEL AND ORAL PROGESTERONE. STARTED 10/2018 TO PRESENT.     PATIENT HAD UTERUS AND BOTH OVARIES REMOVED.     Past Surgical History:   Procedure Laterality Date   • BILATERAL OOPHORECTOMY     • BREAST BIOPSY Left 01/05/2021   • CHOLECYSTECTOMY     • CYSTOSCOPY     • HYSTERECTOMY     • MRI BREAST BIOPSY UNILATERAL Left 02/10/2021   • OOPHORECTOMY         Past Medical History:   Diagnosis Date   • Breast cyst    • Fibrocystic breast    • Rosacea    • Seasonal allergies        Prior Hospitalizations, other than for surgery or childbirth, and year:  NONE     Social History     Socioeconomic History   • Marital status:      Spouse name: Not on file   • Number of children: Not on file   • Years of education: Not on file   • Highest education level: Not on file   Tobacco Use   • Smoking status: Never Smoker   • Smokeless tobacco: Never Used   Substance and Sexual Activity   • Alcohol use: Not Currently     Frequency: Never     Comment: rare-once a year   • Drug use: Never     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    Family History:  Family History   Problem Relation Age of Onset   • Heart disease Mother    • Hypertension Mother    • Hyperlipidemia Mother        Vital Signs:  /80   Pulse 80   Temp 98 °F (36.7 °C)   Ht 165.1 cm (65\")   Wt 97.5 kg (215 lb)   LMP  (LMP Unknown)   SpO2 98%   Breastfeeding No   BMI 35.78 kg/m²      Medications:    Current Outpatient Medications:   •  clotrimazole-betamethasone (Lotrisone) 1-0.05 % " "cream, Apply  topically to the appropriate area as directed 2 (Two) Times a Day., Disp: 15 g, Rfl: 5  •  Estradiol (Estrogel) 0.75 MG/1.25 GM (0.06%) topical gel, Place 2.5 g on the skin as directed by provider Daily. (Patient taking differently: Place 1.25 g on the skin as directed by provider Daily. 1 pump per each arm), Disp: 1 bottle, Rfl: 5  •  progesterone (PROMETRIUM) 100 MG capsule, Take 1 capsule by mouth Daily., Disp: 90 capsule, Rfl: 3  •  vitamin D (ERGOCALCIFEROL) 1.25 MG (82497 UT) capsule capsule, Take 1 capsule by mouth 1 (One) Time Per Week., Disp: 8 capsule, Rfl: 0     Allergies:  No Known Allergies    Physical Examination:  /80   Pulse 80   Temp 98 °F (36.7 °C)   Ht 165.1 cm (65\")   Wt 97.5 kg (215 lb)   LMP  (LMP Unknown)   SpO2 98%   Breastfeeding No   BMI 35.78 kg/m²   General Appearance:  Patient is in no distress.  She is well kept and has an overweight build.   Psychiatric:  Patient with appropriate mood and affect. Alert and oriented to self, time, and place.    Breast, RIGHT:  medium sized, asymmetric with the contralateral side, right breast larger than left.  Breast skin is without erythema, edema, rashes.  There are no visible abnormalities upon inspection during the arm-raising maneuver or with hands on hips in the sitting position. There is no nipple retraction, discharge or nipple/areolar skin changes.There are no masses palpable in the sitting or supine positions.    Breast, LEFT:  medium sized, asymmetric with the contralateral side as above.  Breast skin is without erythema, edema, rashes.   There are notable ecchymoses medial hemisphere left breast from her recent MRI guided biopsy.  There are no other visible abnormalities upon inspection during the arm-raising maneuver or with hands on hips in the sitting position. There is no nipple retraction, discharge or nipple/areolar skin changes.specifically there is no nipple discharge on examination today.  There are no " masses palpable in the sitting or supine positions aside from the hematoma.    Lymphatic:  There is no axillary, cervical, infraclavicular, or supraclavicular adenopathy bilaterally.  Eyes:  Pupils are round and reactive to light.  Cardiovascular:  Heart rate and rhythm are regular.  Respiratory:  Lungs are clear bilaterally with no crackles or wheezes in any lung field.  Gastrointestinal:  Abdomen is soft, nondistended, and nontender.     Musculoskeletal:  Good strength in all 4 extremities.   There is good range of motion in both shoulders.    Skin:  No new skin lesions or rashes on the skin excluding the breast (see breast exam above).        Imagin2019 BL DIAGNOSTIC MAMMO & RIGHT BREAST US [facility]  Done by [facility] in [facility].  History: Right upper-outer quadrant palpable lump.  MMG:  Heterogeneously dense. No correlate to the palpable lump.  US:  Right whole breast ultrasound, 9:30 palpable lump corresponds to a 2.4 x 2.2 cm simple cyst. There are multiple additional benign cysts. 2 of the largest are 1.8 cm at periareolar 1:00 and 1.4 cm in the retroareolar breast.  IMPRESSION:  Palpable lump is a cyst.  BI-RADS Category 2: Benign.    10/11/2019 [facility]  Heterogeneously dense.  BI-RADS Category 2: Benign.    2020 BILATERAL SCREENING MAMMO WITH PAULA  [facility]        ALFREDO ALEJOAZAS  Heterogeneously dense.  BI-RADS Category 2: Benign.    12/15/2020 LEFT DIAGNOSTIC MMG WITH PAULA & LEFT BREAST US        [facility]       ALFREDO OSUNA  Single episode of spontaneous clear left nipple discharge. She states her left breast feels like it is on fire.  MMG:  Heterogeneously dense. There are multiple masses and/or ducts in the retroareolar left breast, which are located within the densest area of breast parenchyma.  US:  Left ultrasound, 6:00, 1 cm from the nipple, 0.5 x 0.4 x 0.5 cm complicated  cyst versus solid mass, may be contiguous with a duct. Otherwise, numerous benign-appearing cysts and ducts are identified.  Complicated cyst versus solid mass at 6:00 in the left breast, evaluation with ultrasound-guided core needle biopsy is recommended.  BI-RADS Category 4: Suspicious.    01/25/2021 BILATERAL BREAST MRI         [facility]  Subareolar right breast, 2.1 cm cyst, which corresponds to the palpable lump.  LEFT BREAST:   6:00, 3.6 cm posterior to the nipple, susceptibility from a biopsy clip, which marks the site of biopsy-proven atypia. No suspicious mass enhancement at this location.   9:00, 3.9 cm posterior to the nipple, there is a 1.2 cm focal area of clumped nonmass enhancement.  IMPRESSION AND RECOMMENDATION:  9:00, left breast. Further evaluation with MRI guided core needle biopsy is recommended.  BI-RADS Category 4: Suspicious.    01/25/2021 RIGHT DIAGNOSTIC MMG WITH PAULA & RIGHT BREAST US    [facility]  CLINICAL INDICATION: Right breast pain and hardening around the right nipple.  MMG:  Heterogeneously dense. There are no suspicious masses, calcifications, or areas of architectural distortion.  US:  Retroareolar right breast, area of concern, 6:00 there is a palpable 2.0 benign-appearing complicated cyst.  IMPRESSION:  BI-RADS Category 2: Benign.    Pathology:  01/05/2021 LEFT DIAGNOSTIC MAMMO & US GUIDED BREAST BIOPSY      [facility]  6:00, 1 cm from the nipple in the left breast, 11 g needle. 11 core specimens were obtained. A bowtie clip was then deployed. Bowtie clip at the expected location in the lower central anterior left breast.  Pathology demonstrates severe atypical ductal hyperplasia bordering on low-grade DCIS, which is concordant.  1. 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  Sections demonstrate and area of possible cyst  wall/dilated duct with associated apocrine cysts and proliferative breast changes. Multiple ducts demonstrate areas of micropapillary atypia, which by IHC qualify as at least ADH. Definitive diagnosis is best deferred to the excision.    02/10/2021 MRI BREAST BIOPSY           [facility]   ALFREDO SHOREAS  An 8 gauge Mammotome. 14 core samples were obtained. A U-shaped clip.   Post-procedural mammographic views of the left breast demonstrate the U-shaped clip at the expected location in the inner central middle to anterior left breast, approximately 3.1 cm medial to the bowtie-shaped biopsy clip at the site of biopsy-proven atypia bordering on DCIS.  Pathology is high risk and concordant with the imaging assessment.  PATHOLOGY:  1. Left Breast, 9:00 o’clock, MRI-Guided Biopsies:  A. Proliferative breast parenchyma with sclerotic intraductal papillomas with florid duct hyperplasia, columnar cell hyperplasia and apocrine cysts.  B. Focal microcalcifications associated with columnar cell hyperplasia.  C. No atypical hyperplasia, in-situ nor invasive carcinoma identified.    Procedures:  Ultrasound-guided cyst aspiration 2-12-21  Indication:  symptomatic cyst  Location: RIGHT Breast 6:00 areolar margin  Consent:  The risks, benefits, and alternatives to the procedure were discussed with the patient, who understood and wished to proceed.  The risks described included, but were not limited to, bleeding, infection, pneumothorax, and cyst recurrence requiring percutaneous excisional biopsy.  Description of Procedure:   After the patient was positioned supine on the procedure table, I located the cyst using ultrasound.  I prepped and draped the breast skin in sterile fashion.  I anesthetized the breast skin with 1% lidocaine with epinephrine.  I then anesthetized the underlying subcutaneous tissue and breast parenchyma surrounding the lesion with 1% lidocaine with epinephrine under ultrasound visualization and guidance. I then  inserted a 21 G needle (attached to a syringe) into the cyst under ultrasound guidance and aspirated the cyst fluid until the cyst completely disappeared. The fluid aspirated was typical cyst fluid, nonbloody. 1cc.  The fluid was discarded.  Manual compression was held for 5 minutes and a bandaid applied.  Marker placed: none  Tolerance: The patient tolerated the procedure well.  Disposition: as below    Assessment:   Diagnosis Plan   1. Solitary cyst of right breast  US Guided Cyst Aspiration Breast Right    No Lab Testing Needed   2. Ductal carcinoma in situ (DCIS) of left breast  Ambulatory Referral to Plastic Surgery   3. Atypical ductal hyperplasia of breast  Ambulatory Referral to Plastic Surgery   4. Papilloma of left breast  Ambulatory Referral to Plastic Surgery   5. Breast mass, right     6. Breast asymmetry     7. Family history unknown     8. Abnormal MRI, breast     9. Hematoma     10. Hormone replacement therapy (postmenopausal)     2-3  Left breast 6:00, 1 cm from the nipple, central, 5 mm mass on ultrasound, severe atypical duct hyperplasia bordering on low-grade duct carcinoma in situ, multiple ducts with micropapillary atypical duct hyperplasia at least, recommend excision.  Bowtie marker.  Note that the bowtie marker and the you marker at the site of papilloma are 3.1 cm apart.    TisN0- stage 0    4,8  LEFT 9:00,  4CFN- 1.2 cm enhancement on MRI- U marker- papilloma, concordant      5  RIGHT 6:00 areolar margin- 2 cm symptomatic cyst- target for aspiration today    6-  RIGHT larger than left at baseline    7-  Does not know family history- family is international    8-  Left breast 3.6 x 3.4 cm hematoma at the site of the MRI guided biopsy of papilloma.    Plan:  The patient goes by Ernestina.  She and I reviewed her history, imaging, imaging reports, pathology reports and examination together today.  We discussed that there are severely atypical cells that are bordering on malignancy in the left  breast.  We discussed that this was a very small focus.  We discussed the recommendation to remove this area surgically to clear the atypical cells and see if there is a pathologic upgrade.  We discussed that she may need radiation and may need hormone blocking medication to reduce the risk of recurrence or a second area of atypia or malignancy.  I asked her to please stop her combination hormone replacement at this time.  She is taking a topical estradiol and a progesterone pill.  With regards to the papilloma.  We discussed that this is perhaps the more likely cause of the nipple discharge based on its closer proximity to the nipple and much larger size.  We discussed the recommendation for excision of this lesion.  We discussed the alternative for each lesion not to excise but she wished to proceed with excision.  We discussed her breast asymmetry and that after removing 2 different sites on the left breast and a breast that is already smaller than her right breast at baseline we recommend she talk with our plastic surgeon about oncoplastic closure for symmetry on both sides.  We discussed that with her young age and not knowing her family history that we recommend sending off genetics today and a tie cancer panel 83 gene was sent off today.    We discussed that with the 3.6 cm hematoma that I would recommend waiting at least 4 to 6 weeks for surgery to allow us to take as little p.o. tissue as we can on the left.  Her next routine screening mammogram is due December 1, 2021 at [facility].  If the lesion does not return as malignant, I would recommend that she continue with annual breast MRI.    She was interested in a pathology consultation to see if another pathologist would agree that this is atypia or precancer and we have sent this off to Dr. Rasmussen today.  With regards to the right breast mass and cyst, this has been tender for her and she wished to have this aspirated today.  She tolerated  this procedure well.  It was normal colored cyst fluid and the fluid was discarded.  The cyst resolved completely with aspiration.      We will schedule her for a left breast needle localized lumpectomy for DCIS and a left breast needle localized excision of papilloma with possible oncoplastics.      Zenia Washington MD        We have spent 65 minutes in visit today, 60 in face to face .      Next Appointment:  Return for surgery.      EMR Dragon/transcription disclaimer:    Much of this encounter note is an electronic transcription/translocation of spoken language to printed text.  The electronic translation of spoken language may permit erroneous, or at times, nonsensical words or phrases to be inadvertently transcribed.  Although I have reviewed the note from such areas, some may still exist.

## 2021-02-12 NOTE — TELEPHONE ENCOUNTER
We can see Ms. Urrutia on Tuesday, Feb 16 at 2:15 pm, please let her know she will receive a link via text a couple days prior to her appt for registration.   Per Laura at 's office.      I called pt and gave her the appointment information with .  Patient v/u    Hailey

## 2021-02-16 ENCOUNTER — TELEPHONE (OUTPATIENT)
Dept: OTHER | Facility: HOSPITAL | Age: 46
End: 2021-02-16

## 2021-02-16 NOTE — TELEPHONE ENCOUNTER
Referral received from Dr. Washington's office. I called Ms. Urrutia and introduced myself and navigational services. She states the plan is to have a lumpectomy on March 15th. She has no questions and concerns at this time. She has a good understanding that her pathology is borderline ductal carcinoma in situ and  is comfortable with her plan of care.     We discussed integrative therapies and other services at [facility]. She verbalized interest in receiving a navigation folder outlining services. I verified her mailing address and will send out a navigation folder with the following information:     [facility],  Sharing Our Stories Breast Cancer Support Group, Cancer and Restorative Exercise (CARE), [employer] Exercise program, Guide for the Newly Diagnosed, Bio[employer], [facility], Massage Therapy, Reiki Therapy, [employer] [facility] [place], Cancer Nutrition, Cleaning for a Reason, and Survivorship Clinic.    She verbalized appreciation for navigational services and she has my contact information and will call with any questions that arise.

## 2021-02-22 LAB
CYTO UR: NORMAL
LAB AP CASE REPORT: NORMAL
LAB AP CLINICAL INFORMATION: NORMAL
LAB AP DIAGNOSIS COMMENT: NORMAL
LAB AP SPECIAL STAINS: NORMAL
PATH REPORT.ADDENDUM SPEC: NORMAL
PATH REPORT.FINAL DX SPEC: NORMAL
PATH REPORT.GROSS SPEC: NORMAL

## 2021-02-25 ENCOUNTER — TELEPHONE (OUTPATIENT)
Dept: SURGERY | Facility: CLINIC | Age: 46
End: 2021-02-25

## 2021-02-25 NOTE — TELEPHONE ENCOUNTER
Multi cancer panel dated February 24, 2021 for [facility] returned as no known mutations.  A variant of uncertain significance was identified inRECQL$ c.1031 G>A.    We will let her know no mutations detected.    Her outside pathology review by Dr Choi returned as :  Low-grade duct carcinoma in situ, micropapillary type, atypical lobular hyperplasia, sclerosing adenosis, intraductal papilloma.      I will get her in to review and discuss treatment options.

## 2021-03-04 ENCOUNTER — TRANSCRIBE ORDERS (OUTPATIENT)
Dept: SURGERY | Facility: CLINIC | Age: 46
End: 2021-03-04

## 2021-03-04 ENCOUNTER — TELEPHONE (OUTPATIENT)
Dept: SURGERY | Facility: CLINIC | Age: 46
End: 2021-03-04

## 2021-03-04 ENCOUNTER — OFFICE VISIT (OUTPATIENT)
Dept: SURGERY | Facility: CLINIC | Age: 46
End: 2021-03-04

## 2021-03-04 VITALS
SYSTOLIC BLOOD PRESSURE: 135 MMHG | HEIGHT: 65 IN | HEART RATE: 73 BPM | OXYGEN SATURATION: 98 % | WEIGHT: 215 LBS | TEMPERATURE: 98.1 F | DIASTOLIC BLOOD PRESSURE: 86 MMHG | BODY MASS INDEX: 35.82 KG/M2

## 2021-03-04 DIAGNOSIS — D24.2 PAPILLOMA OF LEFT BREAST: ICD-10-CM

## 2021-03-04 DIAGNOSIS — N63.10 BREAST MASS, RIGHT: ICD-10-CM

## 2021-03-04 DIAGNOSIS — N64.89 BREAST ASYMMETRY: ICD-10-CM

## 2021-03-04 DIAGNOSIS — Z78.9 FAMILY HISTORY UNKNOWN: ICD-10-CM

## 2021-03-04 DIAGNOSIS — D05.12 DUCTAL CARCINOMA IN SITU (DCIS) OF LEFT BREAST: ICD-10-CM

## 2021-03-04 DIAGNOSIS — Z79.890 HORMONE REPLACEMENT THERAPY (POSTMENOPAUSAL): ICD-10-CM

## 2021-03-04 DIAGNOSIS — T14.8XXA HEMATOMA: ICD-10-CM

## 2021-03-04 DIAGNOSIS — N60.92 ATYPICAL DUCTAL HYPERPLASIA OF LEFT BREAST: Primary | ICD-10-CM

## 2021-03-04 DIAGNOSIS — R92.8 ABNORMAL MRI, BREAST: ICD-10-CM

## 2021-03-04 DIAGNOSIS — N60.01 SOLITARY CYST OF RIGHT BREAST: Primary | ICD-10-CM

## 2021-03-04 PROCEDURE — 99215 OFFICE O/P EST HI 40 MIN: CPT | Performed by: SURGERY

## 2021-03-04 NOTE — TELEPHONE ENCOUNTER
Called Thuy at 's office.  To let her know surgery procedure has changed.    They are seeing the patient on 3-10-21 at 1:45      I called the patient, she v.u. with apptYary Hart

## 2021-03-04 NOTE — TELEPHONE ENCOUNTER
Patient surgery procedure has changed.  Surgery is still on 3-25-21 Main OR [facility]    Spoke to Sara in Surgery Scheduling,  She said she will cancel both NL on 3.25.21    Left Nipple Sparing Mastectomy, Left Axilla Redby Node Biopsy, Possible reconstruction.      Arrive       7:30  SI             9:30  Surgery   10:30    Caroline 3-15-21 @ 10:00    Return to see Dr DOE 4-8-21 arrive 11:15    Hailey

## 2021-03-04 NOTE — PROGRESS NOTES
Chief Complaint: Ernestina Osuna is a 45 y.o. female who was seen in consultation at the request of Cherelle Schumacher MD  for ADH, abnormal breast imaging and newly diagnosed DCIS    History of Present Illness:  Patient presents with ADH/DCIS and abnormal imaging and breast mass.     She had a regular screening mammogram in November.  After her mammogram she noticed a single episode of nipple drainage from her left nipple when she reached down to scratch her left breast.  She only saw this 1 time and says that it was clear.  She then sought imaging for this as below.  After having her initial set of images, she noticed a fairly rapid onset burning tenderness to the right nipple area and behind the nipple.  For this we also did diagnostic imaging below.  On the left severe ADH bordering on duct carcinoma in situ was identified at 6:00 and a papilloma identified on MRI at 9:00 to account for her nipple drainage.  On the right at the site of the mass there was a 2 cm simple cyst.  As below.      She noted no new masses, skin changes,other  nipple changes prior to her most recent imaging.  Her most recent imaging includes the followin2020 BILATERAL SCREENING MAMMO WITH PAULA  [facility]        ERNESTINA OSUNA  Heterogeneously dense.  BI-RADS Category 2: Benign.    12/15/2020 LEFT DIAGNOSTIC MMG WITH PAULA & LEFT BREAST US        [facility]       ERNESTINA OSUNA  Single episode of spontaneous clear left nipple discharge. She states her left breast feels like it is on fire.  MMG:  Heterogeneously dense. There are multiple masses and/or ducts in the retroareolar left breast, which are located within the densest area of breast parenchyma.  US:  Left ultrasound, 6:00, 1 cm from the nipple, 0.5 x 0.4 x 0.5 cm complicated cyst versus solid mass, may be contiguous with a duct. Otherwise, numerous benign-appearing cysts and ducts are identified.  Complicated cyst versus solid mass at 6:00 in the left breast, evaluation with  ultrasound-guided core needle biopsy is recommended.  BI-RADS Category 4: Suspicious.      She had a biopsy on the following day that showed:   01/05/2021 LEFT DIAGNOSTIC MAMMO & US GUIDED BREAST BIOPSY      [facility]  6:00, 1 cm from the nipple in the left breast, 11 g needle. 11 core specimens were obtained. A bowtie clip was then deployed. Bowtie clip at the expected location in the lower central anterior left breast.  Pathology demonstrates severe atypical ductal hyperplasia bordering on low-grade DCIS, which is concordant.  1. 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  Sections demonstrate and area of possible cyst wall/dilated duct with associated apocrine cysts and proliferative breast changes. Multiple ducts demonstrate areas of micropapillary atypia, which by IHC qualify as at least ADH. Definitive diagnosis is best deferred to the excision.    I then arranged for a MRI:    01/25/2021 BILATERAL BREAST MRI         [facility]           ALFREDO OSUNA  Subareolar right breast, 2.1 cm cyst, which corresponds to the palpable lump.  LEFT BREAST:   6:00, 3.6 cm posterior to the nipple, susceptibility from a biopsy clip, which marks the site of biopsy-proven atypia. No suspicious mass enhancement at this location.   9:00, 3.9 cm posterior to the nipple, there is a 1.2 cm focal area of clumped nonmass enhancement.  IMPRESSION AND RECOMMENDATION:  9:00, left breast. Further evaluation with MRI guided core needle biopsy is recommended.  BI-RADS Category 4: Suspicious.    01/25/2021 RIGHT DIAGNOSTIC MMG WITH PAULA & RIGHT BREAST US    [facility]  CLINICAL INDICATION: Right breast pain and hardening around the right nipple.  MMG:  Heterogeneously dense. There are no suspicious masses, calcifications, or areas of architectural distortion.  US:  Retroareolar right breast, area of concern,  6:00 there is a palpable 2.0 benign-appearing complicated cyst.  IMPRESSION:  BI-RADS Category 2: Benign.    I then arranged for a MRI guided biopsy:  02/10/2021 MRI BREAST BIOPSY           [facility]   ALFREDO SHOREAS  An 8 gauge Mammotome. 14 core samples were obtained. A U-shaped clip.   Post-procedural mammographic views of the left breast demonstrate the U-shaped clip at the expected location in the inner central middle to anterior left breast, approximately 3.1 cm medial to the bowtie-shaped biopsy clip at the site of biopsy-proven atypia bordering on DCIS.  Pathology is high risk and concordant with the imaging assessment.  PATHOLOGY:  1. Left Breast, 9:00 o’clock, MRI-Guided Biopsies:  A. Proliferative breast parenchyma with sclerotic intraductal papillomas with florid duct hyperplasia, columnar cell hyperplasia and apocrine cysts.  B. Focal microcalcifications associated with columnar cell hyperplasia.  C. No atypical hyperplasia, in-situ nor invasive carcinoma identified.        She had not had a breast biopsy in the past.  She has had her uterus and ovaries removed, is postmenopausal, and has taken combination HRT since 2018  Her family history includes the following: her family is abroad and she does not know her FH.    Interval History:  In the interim, we received the following results:  Multi cancer panel dated February 24, 2021 for [facility] returned as no known mutations.  A variant of uncertain significance was identified in RECQL c.1031 G>A.       Her outside pathology review by Dr Choi returned as :  Low-grade duct carcinoma in situ, micropapillary type, atypical lobular hyperplasia, sclerosing adenosis, intraductal papilloma.         She reports persistent bruising at the LEFT LIQ biopsy site.  She is here to discuss the above and treatment.      Review of Systems:  Review of Systems   Cardiovascular: Palpitations: PALPITATIONS AT NIGHT , BUT NOT OFTEN, CLEARED BY CARDIOLOGY IN [place] AFTER ECHO  AND HOLTER MONITOR.   Musculoskeletal: Arthralgias: LEFT KNEE PAIN    All other systems reviewed and are negative.       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 2/10/21 BENIGN.    Breast Cancer HIstory:  Patient does not have a past medical history of breast cancer.  Breast Operations, and year:  NONE   Obstetric/Gynecologic History:  Age menstrual periods began: 12  Patient is postmenopausal due to removal of her uterus in the following year: 43   Number of pregnancies:0  Number of live births: 0  Number of abortions or miscarriages: 0  Age of delivery of first child: N/A   BREAST FEEDING: N/A   Length of time taking birth control pills: 2 YRS   Patient is presently taking the following hormone replacement: ESTROGEL AND ORAL PROGESTERONE. STARTED 10/2018 TO PRESENT.     PATIENT HAD UTERUS AND BOTH OVARIES REMOVED.     Past Surgical History:   Procedure Laterality Date   • BILATERAL OOPHORECTOMY     • BREAST BIOPSY Left 01/05/2021   • CHOLECYSTECTOMY     • CYSTOSCOPY     • HYSTERECTOMY     • MRI BREAST BIOPSY UNILATERAL Left 02/10/2021   • OOPHORECTOMY         Past Medical History:   Diagnosis Date   • Breast cyst    • Fibrocystic breast    • Rosacea    • Seasonal allergies        Prior Hospitalizations, other than for surgery or childbirth, and year:  NONE     Social History     Socioeconomic History   • Marital status:      Spouse name: Not on file   • Number of children: Not on file   • Years of education: Not on file   • Highest education level: Not on file   Tobacco Use   • Smoking status: Never Smoker   • Smokeless tobacco: Never Used   Substance and Sexual Activity   • Alcohol use: Not Currently     Frequency: Never     Comment: rare-once a year   • Drug use: Never     Patient is .  WOR[employer]  "BUSINESS  Patient drinks 1 servings of caffeine per day.    Family History:  Family History   Problem Relation Age of Onset   • Heart disease Mother    • Hypertension Mother    • Hyperlipidemia Mother        Vital Signs:  /86   Pulse 73   Temp 98.1 °F (36.7 °C)   Ht 165.1 cm (65\")   Wt 97.5 kg (215 lb)   LMP  (LMP Unknown)   SpO2 98%   Breastfeeding No   BMI 35.78 kg/m²      Medications:    Current Outpatient Medications:   •  clotrimazole-betamethasone (Lotrisone) 1-0.05 % cream, Apply  topically to the appropriate area as directed 2 (Two) Times a Day., Disp: 15 g, Rfl: 5  •  vitamin D (ERGOCALCIFEROL) 1.25 MG (44961 UT) capsule capsule, Take 1 capsule by mouth 1 (One) Time Per Week., Disp: 8 capsule, Rfl: 0     Allergies:  No Known Allergies    Physical Examination:  /86   Pulse 73   Temp 98.1 °F (36.7 °C)   Ht 165.1 cm (65\")   Wt 97.5 kg (215 lb)   LMP  (LMP Unknown)   SpO2 98%   Breastfeeding No   BMI 35.78 kg/m²   General Appearance:  Patient is in no distress.  She is well kept and has an overweight build.   Psychiatric:  Patient with appropriate mood and affect. Alert and oriented to self, time, and place.    Breast, RIGHT:  medium sized, 38B, asymmetric with the contralateral side, right breast larger than left.  Breast skin is without erythema, edema, rashes.  There are no visible abnormalities upon inspection during the arm-raising maneuver or with hands on hips in the sitting position. There is no nipple retraction, discharge or nipple/areolar skin changes.There are no masses palpable in the sitting or supine positions.    Breast, LEFT:  medium sized, 38B,  asymmetric with the contralateral side as above.  Breast skin is without erythema, edema, rashes.   There are notable ecchymoses medial hemisphere left breast from her recent MRI guided biopsy.  There are no other visible abnormalities upon inspection during the arm-raising maneuver or with hands on hips in the sitting " position. There is no nipple retraction, discharge or nipple/areolar skin changes.specifically there is no nipple discharge on examination today.  There are no masses palpable in the sitting or supine positions aside from the hematoma.    Lymphatic:  There is no axillary, cervical, infraclavicular, or supraclavicular adenopathy bilaterally.  Eyes:  Pupils are round and reactive to light.  Cardiovascular:  Heart rate and rhythm are regular.  Respiratory:  Lungs are clear bilaterally with no crackles or wheezes in any lung field.  Gastrointestinal:  Abdomen is soft, nondistended, and nontender.     Musculoskeletal:  Good strength in all 4 extremities.   There is good range of motion in both shoulders.    Skin:  No new skin lesions or rashes on the skin excluding the breast (see breast exam above).        Imagin2019 BL DIAGNOSTIC MAMMO & RIGHT BREAST US [facility]  Done by [facility] in [facility].  History: Right upper-outer quadrant palpable lump.  MMG:  Heterogeneously dense. No correlate to the palpable lump.  US:  Right whole breast ultrasound, 9:30 palpable lump corresponds to a 2.4 x 2.2 cm simple cyst. There are multiple additional benign cysts. 2 of the largest are 1.8 cm at periareolar 1:00 and 1.4 cm in the retroareolar breast.  IMPRESSION:  Palpable lump is a cyst.  BI-RADS Category 2: Benign.    10/11/2019 BILATERAL SCREENING MAMMOGRAPHY         [facility]NA  Heterogeneously dense.  BI-RADS Category 2: Benign.    [facility]  Heterogeneously dense.  BI-RADS Category 2: Benign.    12/15/2020 LEFT DIAGNOSTIC MMG WITH PAULA & LEFT BREAST US        [facility]       ALFREDO OSUNA  Single episode of spontaneous clear left nipple discharge. She states her left breast feels like it is on fire.  MMG:  Heterogeneously dense. There are multiple masses and/or ducts in the retroareolar  left breast, which are located within the densest area of breast parenchyma.  US:  Left ultrasound, 6:00, 1 cm from the nipple, 0.5 x 0.4 x 0.5 cm complicated cyst versus solid mass, may be contiguous with a duct. Otherwise, numerous benign-appearing cysts and ducts are identified.  Complicated cyst versus solid mass at 6:00 in the left breast, evaluation with ultrasound-guided core needle biopsy is recommended.  BI-RADS Category 4: Suspicious.    [facility]  Subareolar right breast, 2.1 cm cyst, which corresponds to the palpable lump.  LEFT BREAST:   6:00, 3.6 cm posterior to the nipple, susceptibility from a biopsy clip, which marks the site of biopsy-proven atypia. No suspicious mass enhancement at this location.   9:00, 3.9 cm posterior to the nipple, there is a 1.2 cm focal area of clumped nonmass enhancement.  IMPRESSION AND RECOMMENDATION:  9:00, left breast. Further evaluation with MRI guided core needle biopsy is recommended.  BI-RADS Category 4: Suspicious.    01/25/2021 RIGHT DIAGNOSTIC MMG WITH PAULA & RIGHT BREAST US    [facility]     ALFREDO OSUNA  CLINICAL INDICATION: Right breast pain and hardening around the right nipple.  MMG:  Heterogeneously dense. There are no suspicious masses, calcifications, or areas of architectural distortion.  US:  Retroareolar right breast, area of concern, 6:00 there is a palpable 2.0 benign-appearing complicated cyst.  IMPRESSION:  BI-RADS Category 2: Benign.    Pathology:  01/05/2021 LEFT DIAGNOSTIC MAMMO & US GUIDED BREAST BIOPSY      [facility]      ALFREDO OSUNA  6:00, 1 cm from the nipple in the left breast, 11 g needle. 11 core specimens were obtained. A bowtie clip was then deployed. Bowtie clip at the expected location in the lower central anterior left breast.  Pathology demonstrates severe atypical ductal hyperplasia bordering on low-grade DCIS, which is concordant.  1. Left Breast, 6 o’clock, 1 cm from the  nipple:  Severe atypical ductal hyperplasia bordering low grade ductal carcinoma in situ (DCIS) (See Comment).  Sections demonstrate and area of possible cyst wall/dilated duct with associated apocrine cysts and proliferative breast changes. Multiple ducts demonstrate areas of micropapillary atypia, which by IHC qualify as at least ADH. Definitive diagnosis is best deferred to the excision.    02/10/2021 MRI BREAST BIOPSY           [facility]   ALFREDO OSUNA  An 8 gauge Mammotome. 14 core samples were obtained. A U-shaped clip.   Post-procedural mammographic views of the left breast demonstrate the U-shaped clip at the expected location in the inner central middle to anterior left breast, approximately 3.1 cm medial to the bowtie-shaped biopsy clip at the site of biopsy-proven atypia bordering on DCIS.  Pathology is high risk and concordant with the imaging assessment.  PATHOLOGY:  1. Left Breast, 9:00 o’clock, MRI-Guided Biopsies:  A. Proliferative breast parenchyma with sclerotic intraductal papillomas with florid duct hyperplasia, columnar cell hyperplasia and apocrine cysts.  B. Focal microcalcifications associated with columnar cell hyperplasia.  C. No atypical hyperplasia, in-situ nor invasive carcinoma identified.                  Multi cancer panel dated February 24, 2021 for [facility] returned as no known mutations.  A variant of uncertain significance was identified inRECQL c.1031 G>A.          Her outside pathology review by Dr Choi returned as :  Low-grade duct carcinoma in situ, micropapillary type, atypical lobular hyperplasia, sclerosing adenosis, intraductal papilloma.            Procedures:  Ultrasound-guided cyst aspiration 2-12-21  Indication:  symptomatic cyst  Location: RIGHT Breast 6:00 areolar margin  Consent:  The risks, benefits, and alternatives to the procedure were discussed with the patient, who understood and wished to proceed.  The risks described included, but were not limited to,  bleeding, infection, pneumothorax, and cyst recurrence requiring percutaneous excisional biopsy.  Description of Procedure:   After the patient was positioned supine on the procedure table, I located the cyst using ultrasound.  I prepped and draped the breast skin in sterile fashion.  I anesthetized the breast skin with 1% lidocaine with epinephrine.  I then anesthetized the underlying subcutaneous tissue and breast parenchyma surrounding the lesion with 1% lidocaine with epinephrine under ultrasound visualization and guidance. I then inserted a 21 G needle (attached to a syringe) into the cyst under ultrasound guidance and aspirated the cyst fluid until the cyst completely disappeared. The fluid aspirated was typical cyst fluid, nonbloody. 1cc.  The fluid was discarded.  Manual compression was held for 5 minutes and a bandaid applied.  Marker placed: none  Tolerance: The patient tolerated the procedure well.  Disposition: as below    Assessment:   Diagnosis Plan   1. Solitary cyst of right breast     2. Ductal carcinoma in situ (DCIS) of left breast     3. Papilloma of left breast     4. Breast mass, right     5. Breast asymmetry     6. Family history unknown     7. Abnormal MRI, breast     8. Hematoma     9. Hormone replacement therapy (postmenopausal)     2-  Left breast 6:00, 1 cm from the nipple, central, 5 mm mass on ultrasound, severe atypical duct hyperplasia bordering on low-grade duct carcinoma in situ, multiple ducts with micropapillary atypical duct hyperplasia at least, recommend excision.  Bowtie marker.  Note that the bowtie marker and the you marker at the site of papilloma are 3.1 cm apart.    TisN0- stage 0    2-20-21-  Her outside pathology review by Dr Choi returned as :  Low-grade duct carcinoma in situ, micropapillary type, atypical lobular hyperplasia, sclerosing adenosis, intraductal papilloma.      Multi cancer panel dated February 24, 2021 for [facility] returned as no known mutations.  A  variant of uncertain significance was identified in RECQL c.1031 G>A.       3,7-  LEFT 9:00,  4CFN- 1.2 cm enhancement on MRI- U marker- papilloma, concordant    1-  RIGHT 6:00 areolar margin- 2 cm symptomatic cyst- target for aspiration at initial visit    5-  RIGHT larger than left at baseline    6-  Does not know family history- family is international  Multi cancer panel dated February 24, 2021 for [facility] returned as no known mutations.  A variant of uncertain significance was identified in RECQL c.1031 G>A.     8-  Left breast 3.6 x 3.4 cm hematoma at the site of the MRI guided biopsy of papilloma.    9-  On HRT at presentation, stopped after consultation    Plan:  The patient goes by Ernestina.    She and I reviewed her genetics results.  We discussed that she has no mutation.  We discussed the nature of variants of uncertain significance.  We discussed that these are not actionable and that I recommend her checking in with the company every 5 or so years to see if this has been reclassified.      We then discussed the fact that the expert opinion reveals that she in fact has a low-grade precancer, micropapillary with associated atypical lobular hyperplasia.  An intraductal papilloma.    We reviewed the difference in systemic therapy versus locoregional. We discussed that she will be seeing a medical oncologist in the adjuvant setting. We discussed that for DCIS, that we would expect a 98% breast cancer specific survival and that the medications offered by medical oncology are for risk reduction for additional breast cancers and for a reduction in ipsilateral locoregional recurrence.     We discussed that for unifocal DCIS, there are 2 options for locoregional treatment that have equivalent survival: total mastectomy versus lumpectomy and radiation, the former with sentinel node biopsy.      Based on her imaging and examination, she would like to proceed with a  LEFT nipple sparing mastectomy, LEFT axilla  sentinel node biopsy,  with or without reconstruction as her surgical treatment.     With the initial presentation with nipple discharge, with the palpable areas of concern to her in that breast, with the left breast being smaller with the right and requiring 2 excisions, and with the micropapillary histology, she is inclined to proceed with a left nipple sparing mastectomy rather than 2 lumpectomies.      She understands the risks of mastectomy including bleeding, infection, seroma and chance of skin ischemia/necrosis.  She knows that if she will have no sensation on her reconstructed breast or nipple.  She understands the risks of nipple loss.  She understands the risks of  sentinel node biopsy, including 7% chance of lymphedema, injury to nerves or vessels in the axilla,  seroma.     We will not plan to do a frozen section on the sentinel node.  We will do a frozen section of the retroareolar ductal tissue.  The original site on biopsy was over 3 cm from the nipple areolar complex.    She is interested in reconstruction at this time and we will get her back in to see Dr Casper.    NCCN guidelines have been followed.    We gave her EMLA cream and tegederm for her sentinel node procedure, and arranged for her to see our nurse navigator.     She will receive a recommendation about radiation after surgery.I am hopeful that she will not need this.  Next right mammogram is due 11-4-21     Zenia Washington MD      Today I spent 45 minutes doing the following: Reviewing records, labs, outside imaging and reports in preparation for the patient visit; obtaining medical history; performing the physical exam; counseling and educating the patient and any available family or caregivers; ordering medications, tests or procedures; coordinating care with any other physicians on her care team as needed, and documenting all of the above in the medical record as well as sending communications with her other healthcare  professionals.      Next Appointment:  Return for surgery.      EMR Dragon/transcription disclaimer:    Much of this encounter note is an electronic transcription/translocation of spoken language to printed text.  The electronic translation of spoken language may permit erroneous, or at times, nonsensical words or phrases to be inadvertently transcribed.  Although I have reviewed the note from such areas, some may still exist.

## 2021-03-10 ENCOUNTER — TRANSCRIBE ORDERS (OUTPATIENT)
Dept: PREADMISSION TESTING | Facility: HOSPITAL | Age: 46
End: 2021-03-10

## 2021-03-10 ENCOUNTER — TELEPHONE (OUTPATIENT)
Dept: SURGERY | Facility: CLINIC | Age: 46
End: 2021-03-10

## 2021-03-10 NOTE — TELEPHONE ENCOUNTER
Ok for patient to get covid vaccination 1 week before surgery in opposite arm as breast cancer.

## 2021-03-13 ENCOUNTER — IMMUNIZATION (OUTPATIENT)
Dept: VACCINE CLINIC | Facility: HOSPITAL | Age: 46
End: 2021-03-13

## 2021-03-13 PROCEDURE — 91300 HC SARSCOV02 VAC 30MCG/0.3ML IM: CPT | Performed by: INTERNAL MEDICINE

## 2021-03-13 PROCEDURE — 0001A: CPT | Performed by: INTERNAL MEDICINE

## 2021-03-15 ENCOUNTER — APPOINTMENT (OUTPATIENT)
Dept: PREADMISSION TESTING | Facility: HOSPITAL | Age: 46
End: 2021-03-15

## 2021-03-15 VITALS
WEIGHT: 212.2 LBS | SYSTOLIC BLOOD PRESSURE: 132 MMHG | HEIGHT: 65 IN | HEART RATE: 75 BPM | BODY MASS INDEX: 35.35 KG/M2 | OXYGEN SATURATION: 98 % | RESPIRATION RATE: 16 BRPM | DIASTOLIC BLOOD PRESSURE: 87 MMHG | TEMPERATURE: 98 F

## 2021-03-15 DIAGNOSIS — N60.92 ATYPICAL DUCTAL HYPERPLASIA OF LEFT BREAST: ICD-10-CM

## 2021-03-15 DIAGNOSIS — D24.2 PAPILLOMA OF LEFT BREAST: ICD-10-CM

## 2021-03-15 LAB
ANION GAP SERPL CALCULATED.3IONS-SCNC: 10.7 MMOL/L (ref 5–15)
BUN SERPL-MCNC: 9 MG/DL (ref 6–20)
BUN/CREAT SERPL: 14.1 (ref 7–25)
CALCIUM SPEC-SCNC: 9.2 MG/DL (ref 8.6–10.5)
CHLORIDE SERPL-SCNC: 106 MMOL/L (ref 98–107)
CO2 SERPL-SCNC: 25.3 MMOL/L (ref 22–29)
CREAT SERPL-MCNC: 0.64 MG/DL (ref 0.57–1)
DEPRECATED RDW RBC AUTO: 40.5 FL (ref 37–54)
ERYTHROCYTE [DISTWIDTH] IN BLOOD BY AUTOMATED COUNT: 12.4 % (ref 12.3–15.4)
GFR SERPL CREATININE-BSD FRML MDRD: 100 ML/MIN/1.73
GLUCOSE SERPL-MCNC: 99 MG/DL (ref 65–99)
HCT VFR BLD AUTO: 41.5 % (ref 34–46.6)
HGB BLD-MCNC: 14.4 G/DL (ref 12–15.9)
MCH RBC QN AUTO: 31 PG (ref 26.6–33)
MCHC RBC AUTO-ENTMCNC: 34.7 G/DL (ref 31.5–35.7)
MCV RBC AUTO: 89.2 FL (ref 79–97)
PLATELET # BLD AUTO: 305 10*3/MM3 (ref 140–450)
PMV BLD AUTO: 10.6 FL (ref 6–12)
POTASSIUM SERPL-SCNC: 3.9 MMOL/L (ref 3.5–5.2)
RBC # BLD AUTO: 4.65 10*6/MM3 (ref 3.77–5.28)
SODIUM SERPL-SCNC: 142 MMOL/L (ref 136–145)
WBC # BLD AUTO: 5.29 10*3/MM3 (ref 3.4–10.8)

## 2021-03-15 PROCEDURE — 80048 BASIC METABOLIC PNL TOTAL CA: CPT

## 2021-03-15 PROCEDURE — 36415 COLL VENOUS BLD VENIPUNCTURE: CPT

## 2021-03-15 PROCEDURE — 85027 COMPLETE CBC AUTOMATED: CPT

## 2021-03-15 RX ORDER — MELATONIN
1000 EVERY MORNING
Status: ON HOLD | COMMUNITY
End: 2021-07-16

## 2021-03-15 NOTE — DISCHARGE INSTRUCTIONS
Take the following medications the morning of surgery:  NONE    ARRIVE 5:15 (CALL OFFICE TO CONFIRM ARRIVAL TIME) 3/25      If you are on prescription narcotic pain medication to control your pain you may also take that medication the morning of surgery.    General Instructions:  • Do not eat solid food after midnight the night before surgery.  • You may drink clear liquids day of surgery but must stop at least one hour before your hospital arrival time.  • It is beneficial for you to have a clear drink that contains carbohydrates the day of surgery.  We suggest a 12 to 20 ounce bottle of Gatorade or Powerade for non-diabetic patients or a 12 to 20 ounce bottle of G2 or Powerade Zero for diabetic patients. (Pediatric patients, are not advised to drink a 12 to 20 ounce carbohydrate drink)    Clear liquids are liquids you can see through.  Nothing red in color.     Plain water                               Sports drinks  Sodas                                   Gelatin (Jell-O)  Fruit juices without pulp such as white grape juice and apple juice  Popsicles that contain no fruit or yogurt  Tea or coffee (no cream or milk added)  Gatorade / Powerade  G2 / Powerade Zero    • Infants may have breast milk up to four hours before surgery.  • Infants drinking formula may drink formula up to six hours before surgery.   • Patients who avoid smoking, chewing tobacco and alcohol for 4 weeks prior to surgery have a reduced risk of post-operative complications.  Quit smoking as many days before surgery as you can.  • Do not smoke, use chewing tobacco or drink alcohol the day of surgery.   • If applicable bring your C-PAP/ BI-PAP machine.  • Bring any papers given to you in the doctor’s office.  • Wear clean comfortable clothes.  • Do not wear contact lenses, false eyelashes or make-up.  Bring a case for your glasses.   • Bring crutches or walker if applicable.  • Remove all piercings.  Leave jewelry and any other valuables at  home.  • Hair extensions with metal clips must be removed prior to surgery.  • The Pre-Admission Testing nurse will instruct you to bring medications if unable to obtain an accurate list in Pre-Admission Testing.        Preventing a Surgical Site Infection:  • For 2 to 3 days before surgery, avoid shaving with a razor because the razor can irritate skin and make it easier to develop an infection.    • Any areas of open skin can increase the risk of a post-operative wound infection by allowing bacteria to enter and travel throughout the body.  Notify your surgeon if you have any skin wounds / rashes even if it is not near the expected surgical site.  The area will need assessed to determine if surgery should be delayed until it is healed.  • The night prior to surgery shower using a fresh bar of anti-bacterial soap (such as Dial) and clean washcloth.  Sleep in a clean bed with clean clothing.  Do not allow pets to sleep with you.  • Shower on the morning of surgery using a fresh bar of anti-bacterial soap (such as Dial) and clean washcloth.  Dry with a clean towel and dress in clean clothing.  • Ask your surgeon if you will be receiving antibiotics prior to surgery.  • Make sure you, your family, and all healthcare providers clean their hands with soap and water or an alcohol based hand  before caring for you or your wound.    Day of surgery:  Your arrival time is approximately two hours before your scheduled surgery time.  Upon arrival, a Pre-op nurse and Anesthesiologist will review your health history, obtain vital signs, and answer questions you may have.  The only belongings needed at this time will be a list of your home medications and if applicable your C-PAP/BI-PAP machine.  A Pre-op nurse will start an IV and you may receive medication in preparation for surgery, including something to help you relax.     Please be aware that surgery does come with discomfort.  We want to make every effort to  control your discomfort so please discuss any uncontrolled symptoms with your nurse.   Your doctor will most likely have prescribed pain medications.      If you are going home after surgery you will receive individualized written care instructions before being discharged.  A responsible adult must drive you to and from the hospital on the day of your surgery and stay with you for 24 hours.  Discharge prescriptions can be filled by the hospital pharmacy during regular pharmacy hours.  If you are having surgery late in the day/evening your prescription may be e-prescribed to your pharmacy.  Please verify your pharmacy hours or chose a 24 hour pharmacy to avoid not having access to your prescription because your pharmacy has closed for the day.    If you are staying overnight following surgery, you will be transported to your hospital room following the recovery period.  [facility] has all private rooms.    If you have any questions please call Pre-Admission Testing at [phone].  Deductibles and co-payments are collected on the day of service. Please be prepared to pay the required co-pay, deductible or deposit on the day of service as defined by your plan.    Patient Education for Self-Quarantine Process    Following your COVID testing, we strongly recommend that you do not leave your home after you have been tested for COVID except to get medical care. This includes not going to work, school or to public areas.  If this is not possible for you to do please limit your activities to only required outings.  Be sure to wear a mask when you are with other people, practice social distancing and wash your hands frequently.      The following items provide additional details to keep you safe.  • Wash your hands with soap and water frequently for at least 20 seconds.   • Avoid touching your eyes, nose and mouth with unwashed hands.  • Do not share anything - utensils, towels, food from the same bowl.    • Have your own utensils, drinking glass, dishes, towels and bedding.   • Do not have visitors.   • Do use FaceTime to stay in touch with family and friends.  • You should stay in a specific room away from others if possible.   • Stay at least 6 feet away from others in the home if you cannot have a dedicated room to yourself.   • Do not snuggle with your pet. While the CDC says there is no evidence that pets can spread COVID-19 or be infected from humans, it is probably best to avoid “petting, snuggling, being kissed or licked and sharing food (during self-quarantine)”, according to the CDC.   • Sanitize household surfaces daily. Include all high touch areas (door handles, light switches, phones, countertops, etc.)  • Do not share a bathroom with others, if possible.   • Wear a mask around others in your home if you are unable to stay in a separate room or 6 feet apart. If  you are unable to wear a mask, have your family member wear a mask if they must be within 6 feet of you.   Call your surgeon immediately if you experience any of the following symptoms:  • Sore Throat  • Shortness of Breath or difficulty breathing  • Cough  • Chills  • Body soreness or muscle pain  • Headache  • Fever  • New loss of taste or smell  • Do not arrive for your surgery ill.  Your procedure will need to be rescheduled to another time.  You will need to call your physician before the day of surgery to avoid any unnecessary exposure to hospital staff as well as other patients.    CHLORHEXIDINE CLOTH INSTRUCTIONS  The morning of surgery follow these instructions using the Chlorhexidine cloths you've been given.  These steps reduce bacteria on the body.  Do not use the cloths near your eyes, ears mouth, genitalia or on open wounds.  Throw the cloths away after use but do not try to flush them down a toilet.      • Open and remove one cloth at a time from the package.    • Leave the cloth unfolded and begin the bathing.  • Massage the  skin with the cloths using gentle pressure to remove bacteria.  Do not scrub harshly.   • Follow the steps below with one 2% CHG cloth per area (6 total cloths).  • One cloth for neck, shoulders and chest.  • One cloth for both arms, hands, fingers and underarms (do underarms last).  • One cloth for the abdomen followed by groin.  • One cloth for right leg and foot including between the toes.  • One cloth for left leg and foot including between the toes.  • The last cloth is to be used for the back of the neck, back and buttocks.    Allow the CHG to air dry 3 minutes on the skin which will give it time to work and decrease the chance of irritation.  The skin may feel sticky until it is dry.  Do not rinse with water or any other liquid or you will lose the beneficial effects of the CHG.  If mild skin irritation occurs, do rinse the skin to remove the CHG.  Report this to the nurse at time of admission.  Do not apply lotions, creams, ointments, deodorants or perfumes after using the clothes. Dress in clean clothes before coming to the hospital.

## 2021-03-17 ENCOUNTER — TELEPHONE (OUTPATIENT)
Dept: SURGERY | Facility: CLINIC | Age: 46
End: 2021-03-17

## 2021-03-17 NOTE — TELEPHONE ENCOUNTER
EMLA Cream 30 g tube no refills   Apply topically once for one dose to affected nipple  Outer edge of affected nipple and cover day of surgery before leaving home.     [id number] - [place], [place] - [address] AT [facility] - [phone]  - [phone] FX Phone:  [phone]

## 2021-03-23 ENCOUNTER — LAB (OUTPATIENT)
Dept: LAB | Facility: HOSPITAL | Age: 46
End: 2021-03-23

## 2021-03-23 PROCEDURE — C9803 HOPD COVID-19 SPEC COLLECT: HCPCS

## 2021-03-23 PROCEDURE — U0004 COV-19 TEST NON-CDC HGH THRU: HCPCS

## 2021-03-24 LAB — SARS-COV-2 RNA RESP QL NAA+PROBE: NOT DETECTED

## 2021-03-25 ENCOUNTER — HOSPITAL ENCOUNTER (OUTPATIENT)
Facility: HOSPITAL | Age: 46
Discharge: HOME | End: 2021-03-26
Attending: SURGERY | Admitting: PLASTIC SURGERY

## 2021-03-25 ENCOUNTER — APPOINTMENT (OUTPATIENT)
Dept: MAMMOGRAPHY | Facility: HOSPITAL | Age: 46
End: 2021-03-25

## 2021-03-25 ENCOUNTER — ANESTHESIA (OUTPATIENT)
Dept: PERIOP | Facility: HOSPITAL | Age: 46
End: 2021-03-25

## 2021-03-25 ENCOUNTER — ANESTHESIA EVENT (OUTPATIENT)
Dept: PERIOP | Facility: HOSPITAL | Age: 46
End: 2021-03-25

## 2021-03-25 ENCOUNTER — HOSPITAL ENCOUNTER (OUTPATIENT)
Dept: NUCLEAR MEDICINE | Facility: HOSPITAL | Age: 46
Discharge: HOME | End: 2021-03-25

## 2021-03-25 DIAGNOSIS — N60.92 ATYPICAL DUCTAL HYPERPLASIA OF LEFT BREAST: ICD-10-CM

## 2021-03-25 DIAGNOSIS — D24.2 PAPILLOMA OF LEFT BREAST: ICD-10-CM

## 2021-03-25 DIAGNOSIS — N60.01 SOLITARY CYST OF RIGHT BREAST: ICD-10-CM

## 2021-03-25 PROCEDURE — 25010000002 PROPOFOL 10 MG/ML EMULSION: Performed by: NURSE ANESTHETIST, CERTIFIED REGISTERED

## 2021-03-25 PROCEDURE — A9541 TC99M SULFUR COLLOID: HCPCS | Performed by: SURGERY

## 2021-03-25 PROCEDURE — G0378 HOSPITAL OBSERVATION PER HR: HCPCS

## 2021-03-25 PROCEDURE — 25010000003 CEFAZOLIN PER 500 MG: Performed by: PLASTIC SURGERY

## 2021-03-25 PROCEDURE — 25010000002 ONDANSETRON PER 1 MG: Performed by: NURSE ANESTHETIST, CERTIFIED REGISTERED

## 2021-03-25 PROCEDURE — 96372 THER/PROPH/DIAG INJ SC/IM: CPT | Performed by: SURGERY

## 2021-03-25 PROCEDURE — 25010000002 FENTANYL CITRATE (PF) 100 MCG/2ML SOLUTION: Performed by: NURSE ANESTHETIST, CERTIFIED REGISTERED

## 2021-03-25 PROCEDURE — 25010000002 DEXAMETHASONE PER 1 MG: Performed by: NURSE ANESTHETIST, CERTIFIED REGISTERED

## 2021-03-25 PROCEDURE — 78195 LYMPH SYSTEM IMAGING: CPT | Performed by: SURGERY

## 2021-03-25 PROCEDURE — 25010000002 GENTAMICIN PER 80 MG: Performed by: PLASTIC SURGERY

## 2021-03-25 PROCEDURE — 19303 MAST SIMPLE COMPLETE: CPT | Performed by: SURGERY

## 2021-03-25 PROCEDURE — 25010000002 PHENYLEPHRINE PER 1 ML: Performed by: NURSE ANESTHETIST, CERTIFIED REGISTERED

## 2021-03-25 PROCEDURE — 88360 TUMOR IMMUNOHISTOCHEM/MANUAL: CPT | Performed by: SURGERY

## 2021-03-25 PROCEDURE — 25010000003 CEFAZOLIN IN DEXTROSE 2-4 GM/100ML-% SOLUTION: Performed by: SURGERY

## 2021-03-25 PROCEDURE — 63710000001 ONDANSETRON PER 8 MG: Performed by: PLASTIC SURGERY

## 2021-03-25 PROCEDURE — 0 TECHNETIUM FILTERED SULFUR COLLOID: Performed by: SURGERY

## 2021-03-25 PROCEDURE — 19303 MAST SIMPLE COMPLETE: CPT | Performed by: SPECIALIST/TECHNOLOGIST, OTHER

## 2021-03-25 PROCEDURE — 25010000002 ROPIVACAINE PER 1 MG: Performed by: PLASTIC SURGERY

## 2021-03-25 PROCEDURE — 38525 BIOPSY/REMOVAL LYMPH NODES: CPT | Performed by: SURGERY

## 2021-03-25 PROCEDURE — 25010000002 NEOSTIGMINE 5 MG/10ML SOLUTION: Performed by: NURSE ANESTHETIST, CERTIFIED REGISTERED

## 2021-03-25 PROCEDURE — C1789 PROSTHESIS, BREAST, IMP: HCPCS | Performed by: SURGERY

## 2021-03-25 PROCEDURE — 88342 IMHCHEM/IMCYTCHM 1ST ANTB: CPT | Performed by: SURGERY

## 2021-03-25 PROCEDURE — 38792 RA TRACER ID OF SENTINL NODE: CPT

## 2021-03-25 PROCEDURE — 88302 TISSUE EXAM BY PATHOLOGIST: CPT | Performed by: SURGERY

## 2021-03-25 PROCEDURE — 88307 TISSUE EXAM BY PATHOLOGIST: CPT | Performed by: SURGERY

## 2021-03-25 PROCEDURE — 25010000002 EPINEPHRINE PER 0.1 MG: Performed by: SURGERY

## 2021-03-25 DEVICE — GRFT TISS ALLODERM RTU 128SQ/CM MD 1.6TO0.4MM: Type: IMPLANTABLE DEVICE | Site: BREAST | Status: FUNCTIONAL

## 2021-03-25 DEVICE — CLIP LIGAT VASC HORIZON TI SM/WD RED 24CT: Type: IMPLANTABLE DEVICE | Site: BREAST | Status: FUNCTIONAL

## 2021-03-25 DEVICE — IMPLANTABLE DEVICE: Type: IMPLANTABLE DEVICE | Site: BREAST | Status: FUNCTIONAL

## 2021-03-25 DEVICE — CLIP LIGAT VASC HORIZON TI MD BLU 24CT: Type: IMPLANTABLE DEVICE | Site: BREAST | Status: FUNCTIONAL

## 2021-03-25 RX ORDER — SODIUM CHLORIDE 0.9 % (FLUSH) 0.9 %
3 SYRINGE (ML) INJECTION EVERY 12 HOURS SCHEDULED
Status: DISCONTINUED | OUTPATIENT
Start: 2021-03-25 | End: 2021-03-26 | Stop reason: HOSPADM

## 2021-03-25 RX ORDER — HYDROMORPHONE HYDROCHLORIDE 1 MG/ML
0.25 INJECTION, SOLUTION INTRAMUSCULAR; INTRAVENOUS; SUBCUTANEOUS
Status: DISCONTINUED | OUTPATIENT
Start: 2021-03-25 | End: 2021-03-26 | Stop reason: HOSPADM

## 2021-03-25 RX ORDER — FLUMAZENIL 0.1 MG/ML
0.2 INJECTION INTRAVENOUS AS NEEDED
Status: DISCONTINUED | OUTPATIENT
Start: 2021-03-25 | End: 2021-03-25 | Stop reason: HOSPADM

## 2021-03-25 RX ORDER — PROPOFOL 10 MG/ML
VIAL (ML) INTRAVENOUS AS NEEDED
Status: DISCONTINUED | OUTPATIENT
Start: 2021-03-25 | End: 2021-03-25 | Stop reason: SURG

## 2021-03-25 RX ORDER — GLYCOPYRROLATE 0.2 MG/ML
INJECTION INTRAMUSCULAR; INTRAVENOUS AS NEEDED
Status: DISCONTINUED | OUTPATIENT
Start: 2021-03-25 | End: 2021-03-25 | Stop reason: SURG

## 2021-03-25 RX ORDER — NALOXONE HCL 0.4 MG/ML
0.1 VIAL (ML) INJECTION
Status: DISCONTINUED | OUTPATIENT
Start: 2021-03-25 | End: 2021-03-26 | Stop reason: HOSPADM

## 2021-03-25 RX ORDER — LIDOCAINE AND PRILOCAINE 25; 25 MG/G; MG/G
CREAM TOPICAL ONCE
Status: COMPLETED | OUTPATIENT
Start: 2021-03-25 | End: 2021-03-25

## 2021-03-25 RX ORDER — DIAZEPAM 5 MG/1
10 TABLET ORAL ONCE
Status: COMPLETED | OUTPATIENT
Start: 2021-03-25 | End: 2021-03-25

## 2021-03-25 RX ORDER — FAMOTIDINE 10 MG/ML
20 INJECTION, SOLUTION INTRAVENOUS ONCE
Status: DISCONTINUED | OUTPATIENT
Start: 2021-03-25 | End: 2021-03-25 | Stop reason: HOSPADM

## 2021-03-25 RX ORDER — ONDANSETRON 2 MG/ML
INJECTION INTRAMUSCULAR; INTRAVENOUS AS NEEDED
Status: DISCONTINUED | OUTPATIENT
Start: 2021-03-25 | End: 2021-03-25 | Stop reason: SURG

## 2021-03-25 RX ORDER — OXYCODONE HYDROCHLORIDE 5 MG/1
5 TABLET ORAL EVERY 4 HOURS PRN
Status: DISCONTINUED | OUTPATIENT
Start: 2021-03-25 | End: 2021-03-26 | Stop reason: HOSPADM

## 2021-03-25 RX ORDER — DEXAMETHASONE SODIUM PHOSPHATE 10 MG/ML
INJECTION INTRAMUSCULAR; INTRAVENOUS AS NEEDED
Status: DISCONTINUED | OUTPATIENT
Start: 2021-03-25 | End: 2021-03-25 | Stop reason: SURG

## 2021-03-25 RX ORDER — SODIUM CHLORIDE 0.9 % (FLUSH) 0.9 %
3 SYRINGE (ML) INJECTION EVERY 12 HOURS SCHEDULED
Status: DISCONTINUED | OUTPATIENT
Start: 2021-03-25 | End: 2021-03-25 | Stop reason: HOSPADM

## 2021-03-25 RX ORDER — LIDOCAINE HYDROCHLORIDE 10 MG/ML
0.5 INJECTION, SOLUTION EPIDURAL; INFILTRATION; INTRACAUDAL; PERINEURAL ONCE AS NEEDED
Status: DISCONTINUED | OUTPATIENT
Start: 2021-03-25 | End: 2021-03-25 | Stop reason: HOSPADM

## 2021-03-25 RX ORDER — HYDRALAZINE HYDROCHLORIDE 20 MG/ML
5 INJECTION INTRAMUSCULAR; INTRAVENOUS
Status: DISCONTINUED | OUTPATIENT
Start: 2021-03-25 | End: 2021-03-25 | Stop reason: HOSPADM

## 2021-03-25 RX ORDER — MIDAZOLAM HYDROCHLORIDE 1 MG/ML
2 INJECTION INTRAMUSCULAR; INTRAVENOUS
Status: DISCONTINUED | OUTPATIENT
Start: 2021-03-25 | End: 2021-03-25 | Stop reason: HOSPADM

## 2021-03-25 RX ORDER — FAMOTIDINE 10 MG/ML
20 INJECTION, SOLUTION INTRAVENOUS ONCE
Status: COMPLETED | OUTPATIENT
Start: 2021-03-25 | End: 2021-03-25

## 2021-03-25 RX ORDER — NALOXONE HCL 0.4 MG/ML
0.2 VIAL (ML) INJECTION AS NEEDED
Status: DISCONTINUED | OUTPATIENT
Start: 2021-03-25 | End: 2021-03-25 | Stop reason: HOSPADM

## 2021-03-25 RX ORDER — FENTANYL CITRATE 50 UG/ML
50 INJECTION, SOLUTION INTRAMUSCULAR; INTRAVENOUS
Status: DISCONTINUED | OUTPATIENT
Start: 2021-03-25 | End: 2021-03-25 | Stop reason: SDUPTHER

## 2021-03-25 RX ORDER — HYDROCODONE BITARTRATE AND ACETAMINOPHEN 7.5; 325 MG/1; MG/1
1 TABLET ORAL ONCE AS NEEDED
Status: DISCONTINUED | OUTPATIENT
Start: 2021-03-25 | End: 2021-03-25 | Stop reason: HOSPADM

## 2021-03-25 RX ORDER — FENTANYL CITRATE 50 UG/ML
50 INJECTION, SOLUTION INTRAMUSCULAR; INTRAVENOUS
Status: DISCONTINUED | OUTPATIENT
Start: 2021-03-25 | End: 2021-03-25 | Stop reason: HOSPADM

## 2021-03-25 RX ORDER — GABAPENTIN 100 MG/1
100 CAPSULE ORAL EVERY 8 HOURS SCHEDULED
Status: DISCONTINUED | OUTPATIENT
Start: 2021-03-25 | End: 2021-03-26 | Stop reason: HOSPADM

## 2021-03-25 RX ORDER — GABAPENTIN 300 MG/1
300 CAPSULE ORAL ONCE
Status: COMPLETED | OUTPATIENT
Start: 2021-03-25 | End: 2021-03-25

## 2021-03-25 RX ORDER — NEOSTIGMINE METHYLSULFATE 0.5 MG/ML
INJECTION, SOLUTION INTRAVENOUS AS NEEDED
Status: DISCONTINUED | OUTPATIENT
Start: 2021-03-25 | End: 2021-03-25 | Stop reason: SURG

## 2021-03-25 RX ORDER — ONDANSETRON 2 MG/ML
4 INJECTION INTRAMUSCULAR; INTRAVENOUS ONCE AS NEEDED
Status: DISCONTINUED | OUTPATIENT
Start: 2021-03-25 | End: 2021-03-25 | Stop reason: HOSPADM

## 2021-03-25 RX ORDER — ONDANSETRON 4 MG/1
4 TABLET, FILM COATED ORAL EVERY 6 HOURS PRN
Status: DISCONTINUED | OUTPATIENT
Start: 2021-03-25 | End: 2021-03-26 | Stop reason: HOSPADM

## 2021-03-25 RX ORDER — MIDAZOLAM HYDROCHLORIDE 1 MG/ML
1 INJECTION INTRAMUSCULAR; INTRAVENOUS
Status: DISCONTINUED | OUTPATIENT
Start: 2021-03-25 | End: 2021-03-25 | Stop reason: HOSPADM

## 2021-03-25 RX ORDER — EPHEDRINE SULFATE 50 MG/ML
5 INJECTION, SOLUTION INTRAVENOUS ONCE AS NEEDED
Status: DISCONTINUED | OUTPATIENT
Start: 2021-03-25 | End: 2021-03-25 | Stop reason: HOSPADM

## 2021-03-25 RX ORDER — SODIUM CHLORIDE, SODIUM LACTATE, POTASSIUM CHLORIDE, CALCIUM CHLORIDE 600; 310; 30; 20 MG/100ML; MG/100ML; MG/100ML; MG/100ML
125 INJECTION, SOLUTION INTRAVENOUS CONTINUOUS
Status: DISCONTINUED | OUTPATIENT
Start: 2021-03-25 | End: 2021-03-25

## 2021-03-25 RX ORDER — PROMETHAZINE HYDROCHLORIDE 25 MG/1
25 TABLET ORAL ONCE AS NEEDED
Status: DISCONTINUED | OUTPATIENT
Start: 2021-03-25 | End: 2021-03-25 | Stop reason: HOSPADM

## 2021-03-25 RX ORDER — MIDAZOLAM HYDROCHLORIDE 1 MG/ML
1 INJECTION INTRAMUSCULAR; INTRAVENOUS
Status: DISCONTINUED | OUTPATIENT
Start: 2021-03-25 | End: 2021-03-25 | Stop reason: SDUPTHER

## 2021-03-25 RX ORDER — HYDROMORPHONE HYDROCHLORIDE 1 MG/ML
0.5 INJECTION, SOLUTION INTRAMUSCULAR; INTRAVENOUS; SUBCUTANEOUS
Status: DISCONTINUED | OUTPATIENT
Start: 2021-03-25 | End: 2021-03-25 | Stop reason: HOSPADM

## 2021-03-25 RX ORDER — MIDAZOLAM HYDROCHLORIDE 1 MG/ML
2 INJECTION INTRAMUSCULAR; INTRAVENOUS
Status: DISCONTINUED | OUTPATIENT
Start: 2021-03-25 | End: 2021-03-25 | Stop reason: SDUPTHER

## 2021-03-25 RX ORDER — ACETAMINOPHEN 325 MG/1
650 TABLET ORAL EVERY 4 HOURS PRN
Status: DISCONTINUED | OUTPATIENT
Start: 2021-03-25 | End: 2021-03-26 | Stop reason: HOSPADM

## 2021-03-25 RX ORDER — SODIUM CHLORIDE 0.9 % (FLUSH) 0.9 %
3-10 SYRINGE (ML) INJECTION AS NEEDED
Status: DISCONTINUED | OUTPATIENT
Start: 2021-03-25 | End: 2021-03-25 | Stop reason: HOSPADM

## 2021-03-25 RX ORDER — FENTANYL CITRATE 50 UG/ML
INJECTION, SOLUTION INTRAMUSCULAR; INTRAVENOUS AS NEEDED
Status: DISCONTINUED | OUTPATIENT
Start: 2021-03-25 | End: 2021-03-25 | Stop reason: SURG

## 2021-03-25 RX ORDER — ACETAMINOPHEN 500 MG
1000 TABLET ORAL ONCE
Status: COMPLETED | OUTPATIENT
Start: 2021-03-25 | End: 2021-03-25

## 2021-03-25 RX ORDER — LABETALOL HYDROCHLORIDE 5 MG/ML
5 INJECTION, SOLUTION INTRAVENOUS
Status: DISCONTINUED | OUTPATIENT
Start: 2021-03-25 | End: 2021-03-25 | Stop reason: HOSPADM

## 2021-03-25 RX ORDER — MAGNESIUM HYDROXIDE 1200 MG/15ML
LIQUID ORAL AS NEEDED
Status: DISCONTINUED | OUTPATIENT
Start: 2021-03-25 | End: 2021-03-25 | Stop reason: HOSPADM

## 2021-03-25 RX ORDER — PROMETHAZINE HYDROCHLORIDE 25 MG/1
25 SUPPOSITORY RECTAL ONCE AS NEEDED
Status: DISCONTINUED | OUTPATIENT
Start: 2021-03-25 | End: 2021-03-25 | Stop reason: HOSPADM

## 2021-03-25 RX ORDER — SODIUM CHLORIDE, SODIUM LACTATE, POTASSIUM CHLORIDE, CALCIUM CHLORIDE 600; 310; 30; 20 MG/100ML; MG/100ML; MG/100ML; MG/100ML
9 INJECTION, SOLUTION INTRAVENOUS CONTINUOUS
Status: DISCONTINUED | OUTPATIENT
Start: 2021-03-25 | End: 2021-03-26 | Stop reason: HOSPADM

## 2021-03-25 RX ORDER — SODIUM CHLORIDE, SODIUM LACTATE, POTASSIUM CHLORIDE, CALCIUM CHLORIDE 600; 310; 30; 20 MG/100ML; MG/100ML; MG/100ML; MG/100ML
9 INJECTION, SOLUTION INTRAVENOUS CONTINUOUS
Status: DISCONTINUED | OUTPATIENT
Start: 2021-03-25 | End: 2021-03-25

## 2021-03-25 RX ORDER — DOXYCYCLINE 100 MG/1
100 CAPSULE ORAL EVERY 12 HOURS SCHEDULED
Status: DISCONTINUED | OUTPATIENT
Start: 2021-03-25 | End: 2021-03-26 | Stop reason: HOSPADM

## 2021-03-25 RX ORDER — SODIUM CHLORIDE 0.9 % (FLUSH) 0.9 %
3-10 SYRINGE (ML) INJECTION AS NEEDED
Status: DISCONTINUED | OUTPATIENT
Start: 2021-03-25 | End: 2021-03-26 | Stop reason: HOSPADM

## 2021-03-25 RX ORDER — OXYCODONE AND ACETAMINOPHEN 7.5; 325 MG/1; MG/1
1 TABLET ORAL ONCE AS NEEDED
Status: DISCONTINUED | OUTPATIENT
Start: 2021-03-25 | End: 2021-03-25 | Stop reason: HOSPADM

## 2021-03-25 RX ORDER — SODIUM CHLORIDE, SODIUM LACTATE, POTASSIUM CHLORIDE, CALCIUM CHLORIDE 600; 310; 30; 20 MG/100ML; MG/100ML; MG/100ML; MG/100ML
100 INJECTION, SOLUTION INTRAVENOUS CONTINUOUS
Status: DISCONTINUED | OUTPATIENT
Start: 2021-03-25 | End: 2021-03-25

## 2021-03-25 RX ORDER — ONDANSETRON 8 MG/1
8 TABLET, ORALLY DISINTEGRATING ORAL EVERY 6 HOURS PRN
Status: DISCONTINUED | OUTPATIENT
Start: 2021-03-25 | End: 2021-03-26 | Stop reason: HOSPADM

## 2021-03-25 RX ORDER — CELECOXIB 200 MG/1
400 CAPSULE ORAL ONCE
Status: COMPLETED | OUTPATIENT
Start: 2021-03-25 | End: 2021-03-25

## 2021-03-25 RX ORDER — ONDANSETRON HYDROCHLORIDE 8 MG/1
8 TABLET, FILM COATED ORAL ONCE
Status: COMPLETED | OUTPATIENT
Start: 2021-03-25 | End: 2021-03-25

## 2021-03-25 RX ORDER — ROCURONIUM BROMIDE 10 MG/ML
INJECTION, SOLUTION INTRAVENOUS AS NEEDED
Status: DISCONTINUED | OUTPATIENT
Start: 2021-03-25 | End: 2021-03-25 | Stop reason: SURG

## 2021-03-25 RX ORDER — LIDOCAINE HYDROCHLORIDE 20 MG/ML
INJECTION, SOLUTION INFILTRATION; PERINEURAL AS NEEDED
Status: DISCONTINUED | OUTPATIENT
Start: 2021-03-25 | End: 2021-03-25 | Stop reason: SURG

## 2021-03-25 RX ORDER — DIPHENHYDRAMINE HYDROCHLORIDE 50 MG/ML
12.5 INJECTION INTRAMUSCULAR; INTRAVENOUS
Status: DISCONTINUED | OUTPATIENT
Start: 2021-03-25 | End: 2021-03-25 | Stop reason: HOSPADM

## 2021-03-25 RX ORDER — CEFAZOLIN SODIUM 2 G/100ML
2 INJECTION, SOLUTION INTRAVENOUS ONCE
Status: COMPLETED | OUTPATIENT
Start: 2021-03-25 | End: 2021-03-25

## 2021-03-25 RX ORDER — OXYCODONE HYDROCHLORIDE 5 MG/1
10 TABLET ORAL ONCE
Status: COMPLETED | OUTPATIENT
Start: 2021-03-25 | End: 2021-03-25

## 2021-03-25 RX ORDER — DIPHENHYDRAMINE HCL 25 MG
25 CAPSULE ORAL
Status: DISCONTINUED | OUTPATIENT
Start: 2021-03-25 | End: 2021-03-25 | Stop reason: HOSPADM

## 2021-03-25 RX ORDER — ONDANSETRON 2 MG/ML
4 INJECTION INTRAMUSCULAR; INTRAVENOUS EVERY 6 HOURS PRN
Status: DISCONTINUED | OUTPATIENT
Start: 2021-03-25 | End: 2021-03-26 | Stop reason: HOSPADM

## 2021-03-25 RX ADMIN — SODIUM CHLORIDE, POTASSIUM CHLORIDE, SODIUM LACTATE AND CALCIUM CHLORIDE 125 ML/HR: 600; 310; 30; 20 INJECTION, SOLUTION INTRAVENOUS at 08:25

## 2021-03-25 RX ADMIN — LIDOCAINE HYDROCHLORIDE 60 MG: 20 INJECTION, SOLUTION INFILTRATION; PERINEURAL at 10:26

## 2021-03-25 RX ADMIN — LIDOCAINE AND PRILOCAINE: 25; 25 CREAM TOPICAL at 08:03

## 2021-03-25 RX ADMIN — DEXAMETHASONE SODIUM PHOSPHATE 8 MG: 10 INJECTION INTRAMUSCULAR; INTRAVENOUS at 10:34

## 2021-03-25 RX ADMIN — FENTANYL CITRATE 50 MCG: 50 INJECTION INTRAMUSCULAR; INTRAVENOUS at 13:19

## 2021-03-25 RX ADMIN — PROPOFOL 250 MG: 10 INJECTION, EMULSION INTRAVENOUS at 10:26

## 2021-03-25 RX ADMIN — SODIUM CHLORIDE, POTASSIUM CHLORIDE, SODIUM LACTATE AND CALCIUM CHLORIDE: 600; 310; 30; 20 INJECTION, SOLUTION INTRAVENOUS at 11:58

## 2021-03-25 RX ADMIN — TECHNETIUM TC 99M SULFUR COLLOID 1 DOSE: KIT at 09:10

## 2021-03-25 RX ADMIN — ONDANSETRON 4 MG: 2 INJECTION INTRAMUSCULAR; INTRAVENOUS at 13:07

## 2021-03-25 RX ADMIN — FENTANYL CITRATE 50 MCG: 50 INJECTION INTRAMUSCULAR; INTRAVENOUS at 10:23

## 2021-03-25 RX ADMIN — ACETAMINOPHEN 650 MG: 325 TABLET, FILM COATED ORAL at 21:01

## 2021-03-25 RX ADMIN — FAMOTIDINE 20 MG: 10 INJECTION INTRAVENOUS at 09:41

## 2021-03-25 RX ADMIN — ROCURONIUM BROMIDE 50 MG: 50 INJECTION INTRAVENOUS at 10:26

## 2021-03-25 RX ADMIN — SODIUM CHLORIDE, POTASSIUM CHLORIDE, SODIUM LACTATE AND CALCIUM CHLORIDE: 600; 310; 30; 20 INJECTION, SOLUTION INTRAVENOUS at 10:20

## 2021-03-25 RX ADMIN — CEFAZOLIN SODIUM 2 G: 2 INJECTION, SOLUTION INTRAVENOUS at 10:15

## 2021-03-25 RX ADMIN — FENTANYL CITRATE 25 MCG: 50 INJECTION INTRAMUSCULAR; INTRAVENOUS at 13:26

## 2021-03-25 RX ADMIN — DIAZEPAM 10 MG: 5 TABLET ORAL at 08:24

## 2021-03-25 RX ADMIN — OXYCODONE 10 MG: 5 TABLET ORAL at 09:32

## 2021-03-25 RX ADMIN — FENTANYL CITRATE 50 MCG: 50 INJECTION INTRAMUSCULAR; INTRAVENOUS at 10:54

## 2021-03-25 RX ADMIN — ONDANSETRON HYDROCHLORIDE 8 MG: 8 TABLET, FILM COATED ORAL at 08:24

## 2021-03-25 RX ADMIN — PHENYLEPHRINE HYDROCHLORIDE 100 MCG: 10 INJECTION INTRAVENOUS at 11:53

## 2021-03-25 RX ADMIN — NEOSTIGMINE METHYLSULFATE 3 MG: 0.5 INJECTION INTRAVENOUS at 13:24

## 2021-03-25 RX ADMIN — FENTANYL CITRATE 25 MCG: 50 INJECTION INTRAMUSCULAR; INTRAVENOUS at 12:12

## 2021-03-25 RX ADMIN — CELECOXIB 400 MG: 200 CAPSULE ORAL at 08:24

## 2021-03-25 RX ADMIN — PHENYLEPHRINE HYDROCHLORIDE 100 MCG: 10 INJECTION INTRAVENOUS at 12:53

## 2021-03-25 RX ADMIN — GABAPENTIN 300 MG: 300 CAPSULE ORAL at 09:33

## 2021-03-25 RX ADMIN — GABAPENTIN 100 MG: 100 CAPSULE ORAL at 16:37

## 2021-03-25 RX ADMIN — GLYCOPYRROLATE 0.4 MG: 0.2 INJECTION INTRAMUSCULAR; INTRAVENOUS at 13:24

## 2021-03-25 RX ADMIN — GABAPENTIN 100 MG: 100 CAPSULE ORAL at 22:52

## 2021-03-25 RX ADMIN — ROCURONIUM BROMIDE 20 MG: 50 INJECTION INTRAVENOUS at 11:44

## 2021-03-25 RX ADMIN — ACETAMINOPHEN 1000 MG: 500 TABLET, FILM COATED ORAL at 08:24

## 2021-03-25 RX ADMIN — DOXYCYCLINE 200 MG: 100 INJECTION, POWDER, LYOPHILIZED, FOR SOLUTION INTRAVENOUS at 09:55

## 2021-03-25 RX ADMIN — PROPOFOL 50 MG: 10 INJECTION, EMULSION INTRAVENOUS at 11:43

## 2021-03-25 NOTE — ANESTHESIA PROCEDURE NOTES
Airway  Urgency: elective    Date/Time: 3/25/2021 10:28 AM  Airway not difficult    General Information and Staff    Patient location during procedure: OR  Anesthesiologist: Randa Billingsley MD  CRNA: Irina Stewart CRNA    Indications and Patient Condition  Indications for airway management: airway protection    Preoxygenated: yes  MILS maintained throughout  Mask difficulty assessment: 1 - vent by mask    Final Airway Details  Final airway type: endotracheal airway      Successful airway: ETT  Cuffed: yes   Successful intubation technique: direct laryngoscopy  Facilitating devices/methods: intubating stylet  Endotracheal tube insertion site: oral  Blade: Araujo  Blade size: 2  ETT size (mm): 7.0  Cormack-Lehane Classification: grade I - full view of glottis  Placement verified by: chest auscultation and capnometry   Cuff volume (mL): 8  Measured from: lips  ETT/EBT  to lips (cm): 21  Number of attempts at approach: 1  Assessment: lips, teeth, and gum same as pre-op and atraumatic intubation    Additional Comments  Pt preoxygenated, SIVI, bag mask vent, ATETI, dentition as before

## 2021-03-25 NOTE — ANESTHESIA PREPROCEDURE EVALUATION
Anesthesia Evaluation     Patient summary reviewed and Nursing notes reviewed   NPO Solid Status: > 8 hours  NPO Liquid Status: > 2 hours           Airway   Mallampati: II  TM distance: >3 FB  Neck ROM: full  no difficulty expected  Dental - normal exam     Pulmonary - negative pulmonary ROS and normal exam   (-) decreased breath sounds, wheezes  Cardiovascular - normal exam  Exercise tolerance: good (4-7 METS)    (+) hyperlipidemia,   (-) hypertension      Neuro/Psych- negative ROS  (-) seizures, CVA  GI/Hepatic/Renal/Endo    (+) obesity,  GERD,  renal disease stones,   (-) diabetes    Musculoskeletal (-) negative ROS    Abdominal  - normal exam   Substance History - negative use  (-) alcohol use, drug use     OB/GYN negative ob/gyn ROS         Other      history of cancer                    Anesthesia Plan    ASA 3     general     intravenous induction     Anesthetic plan, all risks, benefits, and alternatives have been provided, discussed and informed consent has been obtained with: patient.    Plan discussed with CRNA.

## 2021-03-25 NOTE — ANESTHESIA POSTPROCEDURE EVALUATION
"Patient: Ernestina Urrutia    Procedure Summary     Date: 03/25/21 Room / Location: [facility] OR  / [facility] MAIN OR    Anesthesia Start: 1020 Anesthesia Stop: 1342    Procedures:       LEFT mastectomy, LEFT axilla sentinel node biopsy,  with or without reconstruction as her surgical treatment. (Left Breast)      LEFT PLACEMENT TISSUE EXPANDER AND ALLODERM (Left Breast) Diagnosis:       Atypical ductal hyperplasia of left breast      Papilloma of left breast      (Atypical ductal hyperplasia of left breast [N60.92])      (Papilloma of left breast [D24.2])    Surgeons: Zenia Washington MD; Clifford Casper MD Provider: Randa Billingsley MD    Anesthesia Type: general ASA Status: 3          Anesthesia Type: general    Vitals  Vitals Value Taken Time   /73 03/25/21 1415   Temp 36.6 °C (97.8 °F) 03/25/21 1340   Pulse 60 03/25/21 1419   Resp 16 03/25/21 1400   SpO2 94 % 03/25/21 1419   Vitals shown include unvalidated device data.        Post Anesthesia Care and Evaluation    Patient location during evaluation: bedside  Patient participation: complete - patient participated  Level of consciousness: awake and alert  Pain management: adequate  Airway patency: patent  Anesthetic complications: No anesthetic complications    Cardiovascular status: acceptable  Respiratory status: acceptable  Hydration status: acceptable    Comments: /70   Pulse 66   Temp 36.6 °C (97.8 °F) (Oral)   Resp 16   Ht 165.1 cm (65\")   Wt 96.3 kg (212 lb 4.8 oz)   LMP  (LMP Unknown)   SpO2 97%   BMI 35.33 kg/m²       "

## 2021-03-26 ENCOUNTER — READMISSION MANAGEMENT (OUTPATIENT)
Dept: CALL CENTER | Facility: HOSPITAL | Age: 46
End: 2021-03-26

## 2021-03-26 ENCOUNTER — TELEPHONE (OUTPATIENT)
Dept: SURGERY | Facility: CLINIC | Age: 46
End: 2021-03-26

## 2021-03-26 VITALS
RESPIRATION RATE: 18 BRPM | TEMPERATURE: 97.3 F | OXYGEN SATURATION: 97 % | DIASTOLIC BLOOD PRESSURE: 59 MMHG | BODY MASS INDEX: 35.37 KG/M2 | HEART RATE: 74 BPM | SYSTOLIC BLOOD PRESSURE: 103 MMHG | HEIGHT: 65 IN | WEIGHT: 212.3 LBS

## 2021-03-26 DIAGNOSIS — D05.12 BREAST NEOPLASM, TIS (DCIS), LEFT: Primary | ICD-10-CM

## 2021-03-26 LAB
CYTO UR: NORMAL
CYTO UR: NORMAL
LAB AP CASE REPORT: NORMAL
LAB AP CASE REPORT: NORMAL
LAB AP DIAGNOSIS COMMENT: NORMAL
LAB AP DIAGNOSIS COMMENT: NORMAL
LAB AP SPECIAL STAINS: NORMAL
LAB AP SPECIAL STAINS: NORMAL
LAB AP SYNOPTIC CHECKLIST: NORMAL
LAB AP SYNOPTIC CHECKLIST: NORMAL
PATH REPORT.FINAL DX SPEC: NORMAL
PATH REPORT.FINAL DX SPEC: NORMAL
PATH REPORT.GROSS SPEC: NORMAL
PATH REPORT.GROSS SPEC: NORMAL

## 2021-03-26 PROCEDURE — G0378 HOSPITAL OBSERVATION PER HR: HCPCS

## 2021-03-26 PROCEDURE — 25010000002 ENOXAPARIN PER 10 MG: Performed by: PLASTIC SURGERY

## 2021-03-26 PROCEDURE — 99024 POSTOP FOLLOW-UP VISIT: CPT | Performed by: SURGERY

## 2021-03-26 RX ORDER — ONDANSETRON 8 MG/1
8 TABLET, ORALLY DISINTEGRATING ORAL EVERY 8 HOURS PRN
Qty: 10 TABLET | Refills: 1 | Status: SHIPPED | OUTPATIENT
Start: 2021-03-26 | End: 2021-04-07

## 2021-03-26 RX ORDER — DOXYCYCLINE 100 MG/1
100 CAPSULE ORAL 2 TIMES DAILY
Qty: 10 CAPSULE | Refills: 0 | Status: SHIPPED | OUTPATIENT
Start: 2021-03-26 | End: 2021-03-31

## 2021-03-26 RX ORDER — ACETAMINOPHEN 325 MG/1
650 TABLET ORAL EVERY 4 HOURS PRN
Qty: 60 TABLET | Refills: 0 | Status: ON HOLD | OUTPATIENT
Start: 2021-03-26 | End: 2021-07-16

## 2021-03-26 RX ORDER — OXYCODONE HYDROCHLORIDE 5 MG/1
5 TABLET ORAL EVERY 4 HOURS PRN
Qty: 20 TABLET | Refills: 0 | Status: SHIPPED | OUTPATIENT
Start: 2021-03-26 | End: 2021-04-07

## 2021-03-26 RX ORDER — DOCUSATE SODIUM 250 MG
250 CAPSULE ORAL 2 TIMES DAILY PRN
Qty: 60 CAPSULE | Refills: 1 | Status: SHIPPED | OUTPATIENT
Start: 2021-03-26 | End: 2021-04-07

## 2021-03-26 RX ORDER — AMOXICILLIN 250 MG
2 CAPSULE ORAL DAILY PRN
Qty: 30 TABLET | Refills: 1 | Status: SHIPPED | OUTPATIENT
Start: 2021-03-26 | End: 2021-04-07

## 2021-03-26 RX ORDER — GABAPENTIN 100 MG/1
100 CAPSULE ORAL EVERY 8 HOURS
Qty: 60 CAPSULE | Refills: 2 | Status: SHIPPED | OUTPATIENT
Start: 2021-03-26 | End: 2021-07-14

## 2021-03-26 RX ADMIN — DOXYCYCLINE 100 MG: 100 CAPSULE ORAL at 00:12

## 2021-03-26 RX ADMIN — DOXYCYCLINE 100 MG: 100 CAPSULE ORAL at 08:35

## 2021-03-26 RX ADMIN — ENOXAPARIN SODIUM 40 MG: 40 INJECTION SUBCUTANEOUS at 08:35

## 2021-03-26 RX ADMIN — GABAPENTIN 100 MG: 100 CAPSULE ORAL at 06:22

## 2021-03-26 RX ADMIN — ACETAMINOPHEN 650 MG: 325 TABLET, FILM COATED ORAL at 10:36

## 2021-03-26 NOTE — OUTREACH NOTE
Prep Survey      Responses   [facility] facility patient discharged from?  [place]   Is LACE score < 7 ?  Yes   Emergency Room discharge w/ pulse ox?  No   Eligibility  [facility]   Date of Admission  03/25/21   Date of Discharge  03/26/21   Discharge Disposition  Home or Self Care   Discharge diagnosis  left total mastectomy and left DEEP axillary sentinel node biopsy    Does the patient have one of the following disease processes/diagnoses(primary or secondary)?  General Surgery   Does the patient have Home health ordered?  No   Is there a DME ordered?  No   Prep survey completed?  Yes          Kenzie Harper RN

## 2021-03-26 NOTE — TELEPHONE ENCOUNTER
Pathology from 3-25-21 LEFT total mastectomy and SLNB with expander reconstruction Dr Casper returned as :  Multiple intraductal papillomas, sclerosing adenosis, usual hyperplasia, columnar cell change, apocrine cyst, fibroadenomatoid change, negative for malignancy.  0 of 1 sentinel node.  Pathologic stage Tis N0 stage 0.    Dr Fernando called me to comment on the numerous papillomas in the breast.    She will not need radiation. I will call and let her know.    Will arrange for her to see medical oncology .    Final Diagnosis   1. Breast, Left, Mastectomy (912.6 grams):               A. Multiple intraductal papillomas, sclerosing adenosis, florid ductal hyperplasia of the usual type, columnar cell         change, clustered apocrine cysts, fibroadenomatoid change and scattered benign microcalcifications.  B. Negative for atypia, in situ and infiltrating carcinoma.     2. Lymph Node [place] #1, Excision:               A. Reactive lymph node (0/1).     3. Skin, Left Breast, Excision:               A. Benign skin and subcutaneous tissue.      [facility]    Electronically signed by Magdalena Fernando MD on 3/26/2021 at 1541   Synoptic Checklist   DCIS OF THE BREAST: Resection  8th Edition - Protocol posted: 2/26/2020  DCIS OF THE BREAST: COMPLETE EXCISION - All Specimens  SPECIMEN   Procedure  Total mastectomy    Specimen Laterality  Left    TUMOR   Tumor Site  Clock position      6 o'clock    Histologic Type  Ductal carcinoma in situ    Size (Extent) of DCIS  Estimated size (extent) of DCIS is at least (Millimeters): 3 (on core biopsy [id number]) mm   Architectural Patterns  Micropapillary    Nuclear Grade  Grade I (low)    Necrosis  Not identified    Microcalcifications  Present in nonneoplastic tissue    MARGINS   Margins  Uninvolved by DCIS    Distance from Closest Margin (Millimeters)  15 (measured from bowtie clip) mm   Closest Margin(s)  Anterior    LYMPH NODES   Regional Lymph Nodes  Uninvolved by tumor cells    Total  Number of Lymph Nodes Examined  1    Number of [place] Nodes Examined  1    PATHOLOGIC STAGE CLASSIFICATION (pTNM, AJCC 8th Edition)      Primary Tumor (pT)  pTis (DCIS)    Regional Lymph Nodes Modifier  (sn): [place] node(s) evaluated    Regional Lymph Nodes (pN)  pN0    SPECIAL STUDIES   Breast Biomarker Testing Performed on Previous Biopsy     Estrogen Receptor (ER) Status  Positive    Percentage of Cells with Nuclear Positivity  %    Breast Biomarker Testing Performed on Previous Biopsy     Progesterone Receptor (PgR) Status  Positive    Percentage of Cells with Nuclear Positivity  51-60%    Testing Performed on Case Number  [id number]    .      Comment    The patient's concurrent left breast core biopsy material is on file with our department ([id number]); slides are pulled and reviewed for comparison.  No discordance is noted.          Selected slides from this case are shared in internal consultation with Mu Dominguez and Donnell who concur.     Given that there is no definitive residual in situ carcinoma present in mastectomy specimen. The specimen is staged from the previous core where the DCIS measured 3 mm maximally. This case is discussed with Dr. Washington on 3/26/21.     [facility]

## 2021-03-29 ENCOUNTER — TRANSITIONAL CARE MANAGEMENT TELEPHONE ENCOUNTER (OUTPATIENT)
Dept: CALL CENTER | Facility: HOSPITAL | Age: 46
End: 2021-03-29

## 2021-03-29 NOTE — OUTREACH NOTE
Call Center TCM Note      Responses   [facility] [place] by [facility] patient discharged from?  [place]   Does the patient have one of the following disease processes/diagnoses(primary or secondary)?  General Surgery   TCM attempt successful?  Yes   Call start time  1126   Call end time  1135   Discharge diagnosis  left total mastectomy and left DEEP axillary sentinel node biopsy    Is patient permission given to speak with other caregiver?  No   Meds reviewed with patient/caregiver?  Yes   Is the patient having any side effects they believe may be caused by any medication additions or changes?  No   Does the patient have all medications related to this admission filled (includes all antibiotics, pain medications, etc.)  Yes   Is the patient taking all medications as directed (includes completed medication regime)?  Yes   Does the patient have a follow up appointment scheduled with their surgeon?  Yes [Post-Op with Zenia Washington MD 4/5/21]   Has the patient kept scheduled appointments due by today?  N/A   Comments  PCP Dr Schumacher. Hospital follow up scheduled for 4/7/21  10am   Has home health visited the patient within 72 hours of discharge?  N/A   Psychosocial issues?  No   Comments  Patient states that her  is emptying her drains for her.    Did the patient receive a copy of their discharge instructions?  Yes   Nursing interventions  Reviewed instructions with patient   What is the patient's perception of their health status since discharge?  Improving   Nursing interventions  Nurse provided patient education   Is the patient /caregiver able to teach back basic post-op care?  Continue use of incentive spirometry at least 1 week post discharge, Practice 'cough and deep breath', Take showers only when approved by MD-sponge bathe until then, No tub bath, swimming, or hot tub until instructed by MD, Keep incision areas clean,dry and protected, Lifting as instructed by MD in discharge instructions   Is the  patient/caregiver able to teach back signs and symptoms of incisional infection?  Increased redness, swelling or pain at the incisonal site, Increased drainage or bleeding, Incisional warmth, Pus or odor from incision, Fever   Is the patient/caregiver able to teach back steps to recovery at home?  Set small, achievable goals for return to baseline health, Rest and rebuild strength, gradually increase activity   If the patient is a current smoker, are they able to teach back resources for cessation?  Not a smoker   Is the patient/caregiver able to teach back the hierarchy of who to call/visit for symptoms/problems? PCP, Specialist, Home health nurse, Urgent Care, ED, 911  Yes   TCM call completed?  Yes          Paula Dodge RN    3/29/2021, 11:35 EDT

## 2021-03-31 ENCOUNTER — TELEPHONE (OUTPATIENT)
Dept: SURGERY | Facility: CLINIC | Age: 46
End: 2021-03-31

## 2021-03-31 NOTE — TELEPHONE ENCOUNTER
Moved pt's post op appointment to   4-9-21 arrive 10:15    LM for her to please call and confirm.    Hailey

## 2021-04-03 ENCOUNTER — IMMUNIZATION (OUTPATIENT)
Dept: VACCINE CLINIC | Facility: HOSPITAL | Age: 46
End: 2021-04-03

## 2021-04-03 PROCEDURE — 0002A: CPT | Performed by: INTERNAL MEDICINE

## 2021-04-03 PROCEDURE — 91300 HC SARSCOV02 VAC 30MCG/0.3ML IM: CPT | Performed by: INTERNAL MEDICINE

## 2021-04-07 ENCOUNTER — OFFICE VISIT (OUTPATIENT)
Dept: FAMILY MEDICINE CLINIC | Facility: CLINIC | Age: 46
End: 2021-04-07

## 2021-04-07 VITALS
HEIGHT: 65 IN | DIASTOLIC BLOOD PRESSURE: 71 MMHG | HEART RATE: 69 BPM | TEMPERATURE: 97.7 F | OXYGEN SATURATION: 97 % | SYSTOLIC BLOOD PRESSURE: 110 MMHG | WEIGHT: 213 LBS | BODY MASS INDEX: 35.49 KG/M2

## 2021-04-07 DIAGNOSIS — N60.92 ATYPICAL DUCTAL HYPERPLASIA OF LEFT BREAST: ICD-10-CM

## 2021-04-07 DIAGNOSIS — D24.2 PAPILLOMA OF LEFT BREAST: ICD-10-CM

## 2021-04-07 DIAGNOSIS — L71.9 ROSACEA: Primary | ICD-10-CM

## 2021-04-07 PROCEDURE — 99495 TRANSJ CARE MGMT MOD F2F 14D: CPT | Performed by: FAMILY MEDICINE

## 2021-04-07 RX ORDER — CLOTRIMAZOLE AND BETAMETHASONE DIPROPIONATE 10; .64 MG/G; MG/G
CREAM TOPICAL 2 TIMES DAILY
Qty: 45 G | Refills: 3 | Status: SHIPPED | OUTPATIENT
Start: 2021-04-07 | End: 2021-07-14

## 2021-04-07 NOTE — PROGRESS NOTES
Transitional Care Follow Up Visit  Subjective     Ernestina Urrutia is a 45 y.o. female who presents for a transitional care management visit.    Within 48 business hours after discharge our office contacted her via telephone to coordinate her care and needs.      I reviewed and discussed the details of that call along with the discharge summary, hospital problems, inpatient lab results, inpatient diagnostic studies, and consultation reports with Ernestina.     Current outpatient and discharge medications have been reconciled for the patient.  Reviewed by: Cherelle Schumacher MD      Date of TCM Phone Call 3/26/2021   [facility]   Date of Admission 3/25/2021   Date of Discharge 3/26/2021   Discharge Disposition Home or Self Care     Risk for Readmission (LACE) Score: 1 (3/26/2021  6:01 AM)      History of Present Illness   Course During Hospital Stay: Reviewed hospital notes.  She had a left skin sparing mastectomy for breast cancer.  Had a drain placed.  Was put on doxycycline and told to follow-up with her breast surgeon. Has had one drain removed and getting the second one removed in 2 days when she sees surgeon and plastic surgeon. Has f/u with oncology in 1 weeks. Doing well mentally. Needing to restart cream for rosacea.      The following portions of the patient's history were reviewed and updated as appropriate: allergies, current medications, past family history, past medical history, past social history, past surgical history and problem list.    Review of Systems   Constitutional: Negative for fever.   Respiratory: Negative for shortness of breath.        Objective   Physical Exam  Constitutional:       Appearance: Normal appearance. She is well-developed.   Cardiovascular:      Rate and Rhythm: Normal rate and regular rhythm.      Heart sounds: Normal heart sounds.   Pulmonary:      Effort: Pulmonary effort is normal.      Breath sounds: Normal breath sounds.   Musculoskeletal:         General:  No swelling. Normal range of motion.   Skin:     General: Skin is warm and dry.      Findings: No rash.      Comments: Small drain output is serous and incision is c/d/i.    Neurological:      General: No focal deficit present.      Mental Status: She is alert and oriented to person, place, and time.   Psychiatric:         Mood and Affect: Mood normal.         Behavior: Behavior normal.         Assessment/Plan   Diagnoses and all orders for this visit:    1. Rosacea (Primary)  -     clotrimazole-betamethasone (LOTRISONE) 1-0.05 % cream; Apply  topically to the appropriate area as directed 2 (Two) Times a Day.  Dispense: 45 g; Refill: 3    2. Atypical ductal hyperplasia of left breast    3. Papilloma of left breast      Cont f/u with surgeon and oncologist and f/u in 6 months and will get labs.

## 2021-04-07 NOTE — OP NOTE
Pre-Operative Diagnosis: acquired absence left breast     Post-Operative Diagnosis: Same     Procedure Performed:  1. Left prepectoral placement of breast tissue expanders (allergan [id number])  2. left Placement of Alloderm acellular dermal matrix     Surgeon: BRIAN Casper MD     Assistant: None     Anesthesia: General     Estimated Blood Loss: 20     Specimens: none     Complications: none immediate     Indications: She presented after diagnosis of left breast cancer.  She discussed skin sparing mastectomy with Dr. Washington.  We discussed placement of the tissue expanders in a prepectoral position and the use of acellular dermal matrix due to inadequate native dermal support and to mitigate the risks of capsular contracture.     We discussed risks, benefits and alternatives including but no limited to: bleeding, infection, asymmetry, poor or slow wound healing, need for further surgery, possible recurrence.  The patient elected to proceed.     Description of Procedure: The patient was met in the preoperative holding area.  All questions were answered and informed consent was assured.       Breast landmarks were marked and a mastectomy incision was drawn to include the desire skin resection over the wires and confirmed with Dr. Washington.  She was transferred to the operating room.     After induction of appropriate anesthesia, a timeout was performed correctly identifying the patient, operative site, and procedure to be performed.  All present were in agreement.     After completion of the mastectomy the skin appeared viable but cap refill was unreliable secondary to epinephrine infiltration.  The alloderm was irrigated to remove manufacturing byproduct.  The tissue expander was rinsed with triple antibiotic solution.  The anterior wrap was constructed around each expander.       The breast pocket was copiously irrigated with triple antibiotic solution and made hemostatic.  There was excess skin so it was  tailor tacked and the the intervening segment was de-epithelialized to bury under the superior flap. The pocket was again inspected for hemostasis and this was achieved and 50% betadine solution was left to dwell for several minutes.  Two drains were placed and the chest was prepped and draped again.  Gloves were changed.  The pocket was irrigated clear. The expander was placed in the pocket and sutured at the level of the IM fold and the tabs were secured medially, at the fold, and laterally.     The lateral breast was tacked down with 2-0 vicryl and quilting sutures were placed from the alloderm to breast skin.     Closure was performed with 2-0 vicryl in the subcutaneous layer, 3-0 monocryl in the deep dermal layer, and 4-0 monocryl in the deep dermal layer.  The skin was dressed with dermabond and tegaderm and nitropaste was placed on the skin.  She was placed in a well padded ace wrap.     The patient was then aroused from anesthesia with ease and transferred to the postoperative care area in good condition. All sponge, needle, and instrument counts were correct.

## 2021-04-09 ENCOUNTER — OFFICE VISIT (OUTPATIENT)
Dept: SURGERY | Facility: CLINIC | Age: 46
End: 2021-04-09

## 2021-04-09 VITALS
DIASTOLIC BLOOD PRESSURE: 80 MMHG | HEIGHT: 65 IN | OXYGEN SATURATION: 98 % | SYSTOLIC BLOOD PRESSURE: 129 MMHG | HEART RATE: 66 BPM | TEMPERATURE: 97.5 F | BODY MASS INDEX: 36.15 KG/M2 | WEIGHT: 217 LBS

## 2021-04-09 DIAGNOSIS — R92.8 ABNORMAL MRI, BREAST: ICD-10-CM

## 2021-04-09 DIAGNOSIS — N60.01 SOLITARY CYST OF RIGHT BREAST: Primary | ICD-10-CM

## 2021-04-09 DIAGNOSIS — N63.10 BREAST MASS, RIGHT: ICD-10-CM

## 2021-04-09 DIAGNOSIS — D24.2 PAPILLOMA OF LEFT BREAST: ICD-10-CM

## 2021-04-09 DIAGNOSIS — Z78.9 FAMILY HISTORY UNKNOWN: ICD-10-CM

## 2021-04-09 DIAGNOSIS — D05.12 DUCTAL CARCINOMA IN SITU (DCIS) OF LEFT BREAST: ICD-10-CM

## 2021-04-09 DIAGNOSIS — Z79.890 HORMONE REPLACEMENT THERAPY (POSTMENOPAUSAL): ICD-10-CM

## 2021-04-09 PROCEDURE — 99024 POSTOP FOLLOW-UP VISIT: CPT | Performed by: SURGERY

## 2021-04-09 NOTE — PROGRESS NOTES
Chief Complaint: Ernestina Osuna is a 45 y.o. female who was seen in consultation at the request of ELISEO Chong  for ADH, abnormal breast imaging, newly diagnosed DCIS and a postoperative visit    History of Present Illness:  Patient presents with ADH/DCIS and abnormal imaging and breast mass.     She had a regular screening mammogram in November.  After her mammogram she noticed a single episode of nipple drainage from her left nipple when she reached down to scratch her left breast.  She only saw this 1 time and says that it was clear.  She then sought imaging for this as below.  After having her initial set of images, she noticed a fairly rapid onset burning tenderness to the right nipple area and behind the nipple.  For this we also did diagnostic imaging below.  On the left severe ADH bordering on duct carcinoma in situ was identified at 6:00 and a papilloma identified on MRI at 9:00 to account for her nipple drainage.  On the right at the site of the mass there was a 2 cm simple cyst.  As below.      She noted no new masses, skin changes,other  nipple changes prior to her most recent imaging.  Her most recent imaging includes the followin2020 BILATERAL SCREENING MAMMO WITH PAULA  [facility]        ERNESTINA OSUNA  Heterogeneously dense.  BI-RADS Category 2: Benign.    12/15/2020 LEFT DIAGNOSTIC MMG WITH PAULA & LEFT BREAST US        [facility]       ERNESTINA OSUNA  Single episode of spontaneous clear left nipple discharge. She states her left breast feels like it is on fire.  MMG:  Heterogeneously dense. There are multiple masses and/or ducts in the retroareolar left breast, which are located within the densest area of breast parenchyma.  US:  Left ultrasound, 6:00, 1 cm from the nipple, 0.5 x 0.4 x 0.5 cm complicated cyst versus solid mass, may be contiguous with a duct. Otherwise, numerous benign-appearing cysts and ducts are identified.  Complicated cyst versus solid mass at 6:00 in the left  breast, evaluation with ultrasound-guided core needle biopsy is recommended.  BI-RADS Category 4: Suspicious.      She had a biopsy on the following day that showed:   01/05/2021 LEFT DIAGNOSTIC MAMMO & US GUIDED BREAST BIOPSY      TOYA OSUNA  6:00, 1 cm from the nipple in the left breast, 11 g needle. 11 core specimens were obtained. A bowtie clip was then deployed. Bowtie clip at the expected location in the lower central anterior left breast.  Pathology demonstrates severe atypical ductal hyperplasia bordering on low-grade DCIS, which is concordant.  1. 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  Sections demonstrate and area of possible cyst wall/dilated duct with associated apocrine cysts and proliferative breast changes. Multiple ducts demonstrate areas of micropapillary atypia, which by IHC qualify as at least ADH. Definitive diagnosis is best deferred to the excision.    I then arranged for a MRI:    01/25/2021 BILATERAL BREAST MRI         [facility]           ALFREDO OSUNA  Subareolar right breast, 2.1 cm cyst, which corresponds to the palpable lump.  LEFT BREAST:   6:00, 3.6 cm posterior to the nipple, susceptibility from a biopsy clip, which marks the site of biopsy-proven atypia. No suspicious mass enhancement at this location.   9:00, 3.9 cm posterior to the nipple, there is a 1.2 cm focal area of clumped nonmass enhancement.  IMPRESSION AND RECOMMENDATION:  9:00, left breast. Further evaluation with MRI guided core needle biopsy is recommended.  BI-RADS Category 4: Suspicious.    01/25/2021 RIGHT DIAGNOSTIC MMG WITH PAULA & RIGHT BREAST US    [facility]     ALFREDO OSUNA  CLINICAL INDICATION: Right breast pain and hardening around the right nipple.  MMG:  Heterogeneously dense. There are no suspicious masses, calcifications, or areas of architectural distortion.  US:  Retroareolar right  breast, area of concern, 6:00 there is a palpable 2.0 benign-appearing complicated cyst.  IMPRESSION:  BI-RADS Category 2: Benign.    I then arranged for a MRI guided biopsy:  02/10/2021 MRI BREAST BIOPSY           [facility]   ALFREDO SHOREAS  An 8 gauge Mammotome. 14 core samples were obtained. A U-shaped clip.   Post-procedural mammographic views of the left breast demonstrate the U-shaped clip at the expected location in the inner central middle to anterior left breast, approximately 3.1 cm medial to the bowtie-shaped biopsy clip at the site of biopsy-proven atypia bordering on DCIS.  Pathology is high risk and concordant with the imaging assessment.  PATHOLOGY:  1. Left Breast, 9:00 o’clock, MRI-Guided Biopsies:  A. Proliferative breast parenchyma with sclerotic intraductal papillomas with florid duct hyperplasia, columnar cell hyperplasia and apocrine cysts.  B. Focal microcalcifications associated with columnar cell hyperplasia.  C. No atypical hyperplasia, in-situ nor invasive carcinoma identified.        She had not had a breast biopsy in the past.  She has had her uterus and ovaries removed, is postmenopausal, and has taken combination HRT since 2018  Her family history includes the following: her family is abroad and she does not know her FH.      In the interim, we received the following results:  Multi cancer panel dated February 24, 2021 for [facility] returned as no known mutations.  A variant of uncertain significance was identified in RECQL c.1031 G>A.       Her outside pathology review by Dr Choi returned as :  Low-grade duct carcinoma in situ, micropapillary type, atypical lobular hyperplasia, sclerosing adenosis, intraductal papilloma.         She reports persistent bruising at the LEFT LIQ biopsy site.  She is here to discuss the above and treatment.    Interval History:  Pathology from 3-25-21 LEFT total mastectomy and SLNB with expander reconstruction Dr Casper returned as :  Multiple  intraductal papillomas, sclerosing adenosis, usual hyperplasia, columnar cell change, apocrine cyst, fibroadenomatoid change, negative for malignancy.  0 of 1 sentinel node.  Pathologic stage Tis N0 stage 0.     Dr Fernando called me to comment on the numerous papillomas in the breast.     She will not need radiatio    She denies any redness warmth or drainage from her incision.  She is having only mild discomfort.        Review of Systems:  Review of Systems   Constitutional: Positive for unexpected weight change (2 LB WT GAIN ).   Cardiovascular: Palpitations: PALPITATIONS AT NIGHT , BUT NOT OFTEN, CLEARED BY CARDIOLOGY IN [place] AFTER ECHO AND HOLTER MONITOR.   Musculoskeletal: Arthralgias: LEFT KNEE PAIN    All other systems reviewed and are negative.       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 2/10/21 BENIGN.    Breast Cancer HIstory:  Patient does not have a past medical history of breast cancer.  Breast Operations, and year:  NONE   Obstetric/Gynecologic History:  Age menstrual periods began: 12  Patient is postmenopausal due to removal of her uterus in the following year: 43   Number of pregnancies:0  Number of live births: 0  Number of abortions or miscarriages: 0  Age of delivery of first child: N/A   BREAST FEEDING: N/A   Length of time taking birth control pills: 2 YRS   Patient is presently taking the following hormone replacement: ESTROGEL AND ORAL PROGESTERONE. STARTED 10/2018 TO PRESENT.     PATIENT HAD UTERUS AND BOTH OVARIES REMOVED.     Past Surgical History:   Procedure Laterality Date   • BILATERAL OOPHORECTOMY     • BREAST BIOPSY Left 01/05/2021   • BREAST RECONSTRUCTION Left 3/25/2021    Procedure: LEFT PLACEMENT TISSUE EXPANDER AND ALLODERM;  Surgeon: Clifford Casper MD;  Location: [facility] OR;  Service:  "Plastics;  Laterality: Left;   • CHOLECYSTECTOMY     • CYSTOSCOPY     • HYSTERECTOMY     • MASTECTOMY W/ SENTINEL NODE BIOPSY Left 3/25/2021    Procedure: LEFT mastectomy, LEFT axilla sentinel node biopsy,  with or without reconstruction as her surgical treatment.;  Surgeon: Zenia Washington MD;  Location: [facility] OR;  Service: General;  Laterality: Left;   • MRI BREAST BIOPSY UNILATERAL Left 02/10/2021   • OOPHORECTOMY         Past Medical History:   Diagnosis Date   • Acid reflux    • Breast cyst    • DCIS (ductal carcinoma in situ)     LEFT BREAST   • Elevated cholesterol    • Fibrocystic breast    • History of hematuria     MICROSCOPIC   • History of kidney stones    • History of migraine    • Knee pain, left    • Rosacea    • Seasonal allergies        Prior Hospitalizations, other than for surgery or childbirth, and year:  NONE     Social History     Socioeconomic History   • Marital status:      Spouse name: Not on file   • Number of children: Not on file   • Years of education: Not on file   • Highest education level: Not on file   Tobacco Use   • Smoking status: Never Smoker   • Smokeless tobacco: Never Used   Vaping Use   • Vaping Use: Never used   Substance and Sexual Activity   • Alcohol use: Not Currently     Comment: rare-once a year   • Drug use: Never     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    Family History:  Family History   Problem Relation Age of Onset   • Heart disease Mother    • Hypertension Mother    • Hyperlipidemia Mother    • Malig Hyperthermia Neg Hx        Vital Signs:  /80   Pulse 66   Temp 97.5 °F (36.4 °C)   Ht 165.1 cm (65\")   Wt 98.4 kg (217 lb)   LMP  (LMP Unknown)   SpO2 98%   Breastfeeding No   BMI 36.11 kg/m²      Medications:    Current Outpatient Medications:   •  acetaminophen (TYLENOL) 325 MG tablet, Take 2 tablets by mouth Every 4 (Four) Hours As Needed for Mild Pain ., Disp: 60 " "tablet, Rfl: 0  •  cholecalciferol (Vitamin D) 25 MCG (1000 UT) tablet, Take 1,000 Units by mouth Every Morning., Disp: , Rfl:   •  clotrimazole-betamethasone (LOTRISONE) 1-0.05 % cream, Apply  topically to the appropriate area as directed 2 (Two) Times a Day., Disp: 45 g, Rfl: 3  •  gabapentin (Neurontin) 100 MG capsule, Take 1 capsule by mouth Every 8 (Eight) Hours., Disp: 60 capsule, Rfl: 2     Allergies:  No Known Allergies    Physical Examination:  /80   Pulse 66   Temp 97.5 °F (36.4 °C)   Ht 165.1 cm (65\")   Wt 98.4 kg (217 lb)   LMP  (LMP Unknown)   SpO2 98%   Breastfeeding No   BMI 36.11 kg/m²   General Appearance:  Patient is in no distress.  She is well kept and has an overweight build.   Psychiatric:  Patient with appropriate mood and affect. Alert and oriented to self, time, and place.    Breast, RIGHT:  medium sized, 38B, asymmetric with the contralateral surgically absent side.  Breast skin is without erythema, edema, rashes.  There are no visible abnormalities upon inspection during the arm-raising maneuver or with hands on hips in the sitting position. There is no nipple retraction, discharge or nipple/areolar skin changes.There are no masses palpable in the sitting or supine positions.    Breast, LEFT:  Surgically absent with well-healing transverse incision and expander in place on expanded.  Drain has been removed.  This morning and Dr. Casper's office.  No undrained fluid collection.  No skin nodules or discolorations.      Lymphatic:  There is no axillary, cervical, infraclavicular, or supraclavicular adenopathy bilaterally.  Eyes:  Pupils are round and reactive to light.  Cardiovascular:  Heart rate and rhythm are regular.  Respiratory:  Lungs are clear bilaterally with no crackles or wheezes in any lung field.  Gastrointestinal:  Abdomen is soft, nondistended, and nontender.     Musculoskeletal:  Good strength in all 4 extremities.   There is good range of motion in both " shoulders.    Skin:  No new skin lesions or rashes on the skin excluding the breast (see breast exam above).        Imagin2019 BL DIAGNOSTIC MAMMO & RIGHT BREAST US [facility]      ALFREDO ALEJOAZAS  Done by [facility] in [place].  History: Right upper-outer quadrant palpable lump.  MMG:  Heterogeneously dense. No correlate to the palpable lump.  US:  Right whole breast ultrasound, 9:30 palpable lump corresponds to a 2.4 x 2.2 cm simple cyst. There are multiple additional benign cysts. 2 of the largest are 1.8 cm at periareolar 1:00 and 1.4 cm in the retroareolar breast.  IMPRESSION:  Palpable lump is a cyst.  BI-RADS Category 2: Benign.    10/11/2019 BILATERAL SCREENING MAMMOGRAPHY         [facility]           ALFREDO OSNUA  Heterogeneously dense.  BI-RADS Category 2: Benign.    2020 BILATERAL SCREENING MAMMO WITH PAULA  [facility]        ALFREDO ALEJOAZAS  Heterogeneously dense.  BI-RADS Category 2: Benign.    12/15/2020 LEFT DIAGNOSTIC MMG WITH PAULA & LEFT BREAST US        [facility]       ALFREDO OSUNA  Single episode of spontaneous clear left nipple discharge. She states her left breast feels like it is on fire.  MMG:  Heterogeneously dense. There are multiple masses and/or ducts in the retroareolar left breast, which are located within the densest area of breast parenchyma.  US:  Left ultrasound, 6:00, 1 cm from the nipple, 0.5 x 0.4 x 0.5 cm complicated cyst versus solid mass, may be contiguous with a duct. Otherwise, numerous benign-appearing cysts and ducts are identified.  Complicated cyst versus solid mass at 6:00 in the left breast, evaluation with ultrasound-guided core needle biopsy is recommended.  BI-RADS Category 4: Suspicious.    2021 BILATERAL BREAST MRI         [facility]           ALFREDO OSUNA  Subareolar right breast, 2.1 cm cyst, which corresponds to the palpable lump.  LEFT BREAST:   6:00, 3.6 cm posterior to the nipple, susceptibility from a biopsy clip, which  marks the site of biopsy-proven atypia. No suspicious mass enhancement at this location.   9:00, 3.9 cm posterior to the nipple, there is a 1.2 cm focal area of clumped nonmass enhancement.  IMPRESSION AND RECOMMENDATION:  9:00, left breast. Further evaluation with MRI guided core needle biopsy is recommended.  BI-RADS Category 4: Suspicious.    01/25/2021 [facility] PA[facility]  CLINICAL INDICATION: Right breast pain and hardening around the right nipple.  MMG:  Heterogeneously dense. There are no suspicious masses, calcifications, or areas of architectural distortion.  US:  Retroareolar right breast, area of concern, 6:00 there is a palpable 2.0 benign-appearing complicated cyst.  IMPRESSION:  BI-RADS Category 2: Benign.    Pathology:  01/05/2021 LEFT DIAGNOSTIC MAMMO & [facility]UID[facility] BREAST BI[facility]  6:00, 1 cm from the nipple in the left breast, 11 g needle. 11 core specimens were obtained. A bowtie clip was then deployed. Bowtie clip at the expected location in the lower central anterior left breast.  Pathology demonstrates severe atypical ductal hyperplasia bordering on low-grade DCIS, which is concordant.  1. 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  Sections demonstrate and area of possible cyst wall/dilated duct with associated apocrine cysts and proliferative breast changes. Multiple ducts demonstrate areas of micropapillary atypia, which by IHC qualify as at least ADH. Definitive diagnosis is best deferred to the excision.    02/10/2021 MRI BREAST BIOPSY           [facility]  An 8 gauge Mammotome. 14 core samples were obtained. A U-shaped clip.   Post-procedural mammographic views of the left breast demonstrate the U-shaped clip at the expected location in the inner central middle to anterior left breast,  approximately 3.1 cm medial to the bowtie-shaped biopsy clip at the site of biopsy-proven atypia bordering on DCIS.  Pathology is high risk and concordant with the imaging assessment.  PATHOLOGY:  1. Left Breast, 9:00 o’clock, MRI-Guided Biopsies:  A. Proliferative breast parenchyma with sclerotic intraductal papillomas with florid duct hyperplasia, columnar cell hyperplasia and apocrine cysts.  B. Focal microcalcifications associated with columnar cell hyperplasia.  C. No atypical hyperplasia, in-situ nor invasive carcinoma identified.      Pathology from 3-25-21 LEFT total mastectomy and SLNB with expander reconstruction Dr Casper returned as :  Multiple intraductal papillomas, sclerosing adenosis, usual hyperplasia, columnar cell change, apocrine cyst, fibroadenomatoid change, negative for malignancy.  0 of 1 sentinel node.  Pathologic stage Tis N0 stage 0.     Dr Fernando called me to comment on the numerous papillomas in the breast.     She will not need radiation. I will call and let her know.     Will arrange for her to see medical oncology .          Final Diagnosis    1. Breast, Left, Mastectomy (912.6 grams):               A. Multiple intraductal papillomas, sclerosing adenosis, florid ductal hyperplasia of the usual type, columnar cell         change, clustered apocrine cysts, fibroadenomatoid change and scattered benign microcalcifications.  B. Negative for atypia, in situ and infiltrating carcinoma.     2. Lymph Node Cameron #1, Excision:               A. Reactive lymph node (0/1).     3. Skin, Left Breast, Excision:               A. Benign skin and subcutaneous tissue.      [facility]     Electronically signed by Magdalena Fernando MD on 3/26/2021 at 1541    Synoptic Checklist    DCIS OF THE BREAST: Resection  8th Edition - Protocol posted: 2/26/2020  DCIS OF THE BREAST: COMPLETE EXCISION - All Specimens       SPECIMEN   Procedure   Total mastectomy    Specimen Laterality   Left    TUMOR   Tumor Site    Clock position        6 o'clock    Histologic Type   Ductal carcinoma in situ    Size (Extent) of DCIS   Estimated size (extent) of DCIS is at least (Millimeters): 3 (on core biopsy [id number]) mm   Architectural Patterns   Micropapillary    Nuclear Grade   Grade I (low)    Necrosis   Not identified    Microcalcifications   Present in nonneoplastic tissue    MARGINS   Margins   Uninvolved by DCIS    Distance from Closest Margin (Millimeters)   15 (measured from bowtie clip) mm   Closest Margin(s)   Anterior    LYMPH NODES   Regional Lymph Nodes   Uninvolved by tumor cells    Total Number of Lymph Nodes Examined   1    Number of [place] Nodes Examined   1    PATHOLOGIC STAGE CLASSIFICATION (pTNM, AJCC 8th Edition)       Primary Tumor (pT)   pTis (DCIS)    Regional Lymph Nodes Modifier   (sn): [place] node(s) evaluated    Regional Lymph Nodes (pN)   pN0    SPECIAL STUDIES   Breast Biomarker Testing Performed on Previous Biopsy       Estrogen Receptor (ER) Status   Positive    Percentage of Cells with Nuclear Positivity   %    Breast Biomarker Testing Performed on Previous Biopsy       Progesterone Receptor (PgR) Status   Positive    Percentage of Cells with Nuclear Positivity   51-60%    Testing Performed on Case Number   [id number]    .      Comment      The patient's concurrent left breast core biopsy material is on file with our department ([id number]); slides are pulled and reviewed for comparison.  No discordance is noted.          Selected slides from this case are shared in internal consultation with Mu Dominguez and Donnell who concur.     Given that there is no definitive residual in situ carcinoma present in mastectomy specimen. The specimen is staged from the previous core where the DCIS measured 3 mm maximally. This case is discussed with Dr. Washington on 3/26/21.     [facility]/m                    Multi cancer panel dated February 24, 2021 for [facility] returned as no known mutations.  A variant of uncertain  significance was identified inRECQL c.1031 G>A.          Her outside pathology review by Dr Choi returned as :  Low-grade duct carcinoma in situ, micropapillary type, atypical lobular hyperplasia, sclerosing adenosis, intraductal papilloma.            Procedures:  Ultrasound-guided cyst aspiration 2-12-21  Indication:  symptomatic cyst  Location: RIGHT Breast 6:00 areolar margin  Consent:  The risks, benefits, and alternatives to the procedure were discussed with the patient, who understood and wished to proceed.  The risks described included, but were not limited to, bleeding, infection, pneumothorax, and cyst recurrence requiring percutaneous excisional biopsy.  Description of Procedure:   After the patient was positioned supine on the procedure table, I located the cyst using ultrasound.  I prepped and draped the breast skin in sterile fashion.  I anesthetized the breast skin with 1% lidocaine with epinephrine.  I then anesthetized the underlying subcutaneous tissue and breast parenchyma surrounding the lesion with 1% lidocaine with epinephrine under ultrasound visualization and guidance. I then inserted a 21 G needle (attached to a syringe) into the cyst under ultrasound guidance and aspirated the cyst fluid until the cyst completely disappeared. The fluid aspirated was typical cyst fluid, nonbloody. 1cc.  The fluid was discarded.  Manual compression was held for 5 minutes and a bandaid applied.  Marker placed: none  Tolerance: The patient tolerated the procedure well.  Disposition: as below    Assessment:   Diagnosis Plan   1. Solitary cyst of right breast     2. Ductal carcinoma in situ (DCIS) of left breast  Mammo Screening Modified With Tomosynthesis Right With CAD   3. Papilloma of left breast     4. Breast mass, right     5. Family history unknown     6. Abnormal MRI, breast     7. Hormone replacement therapy (postmenopausal)        1-  RIGHT 6:00 areolar margin- 2 cm symptomatic cyst- target for  aspiration at initial visit    2-  Left breast 6:00, 1 cm from the nipple, central, 5 mm mass on ultrasound, severe atypical duct hyperplasia bordering on low-grade duct carcinoma in situ, multiple ducts with micropapillary atypical duct hyperplasia at least, recommend excision.  Bowtie marker.  Note that the bowtie marker and the you marker at the site of papilloma are 3.1 cm apart.    TisN0- stage 0    2-20-21-  Her outside pathology review by Dr Choi returned as :  Low-grade duct carcinoma in situ, micropapillary type, atypical lobular hyperplasia, sclerosing adenosis, intraductal papilloma.    Pathology from 3-25-21 LEFT total mastectomy and SLNB with prepectoral expander reconstruction Dr Casper returned as :  Multiple intraductal papillomas, sclerosing adenosis, usual hyperplasia, columnar cell change, apocrine cyst, fibroadenomatoid change, negative for malignancy.  0 of 1 sentinel node.  Pathologic stage Tis N0 stage 0.     Dr Fernando called me to comment on the numerous papillomas in the breast.       Multi cancer panel dated February 24, 2021 for [facility] returned as no known mutations.  A variant of uncertain significance was identified in RECQL c.1031 G>A.     3,6-  LEFT 9:00,  4CFN- 1.2 cm enhancement on MRI- U marker- papilloma, concordant      6-  Does not know family history- family is international  Multi cancer panel dated February 24, 2021 for [facility] returned as no known mutations.  A variant of uncertain significance was identified in RECQL c.1031 G>A.     7-  On HRT at presentation, stopped after consultation    Plan:  The patient goes by Ernestina.    She is doing very well postoperatively today.  We reviewed her pathology together today.  She will not need radiation.  The only malignancy was the 3 mm of DCIS on core.  She had numerous papillomas, enough so that the pathologist called me.  Her margins are all clear.  I have arranged for her to see medical oncology and she will see them April  19 to discuss risk reduction strategies for the contralateral breast.  Her next routine screening mammogram on the right will be due November 4, 2021 at [facility].  I will order that and see her back after.    I asked her to call us in the interim with any concerns would be happy to see her back sooner.      Zenia Washington MD    Next Appointment:  Return for Next scheduled follow up, after imaging.      EMR Dragon/transcription disclaimer:    Much of this encounter note is an electronic transcription/translocation of spoken language to printed text.  The electronic translation of spoken language may permit erroneous, or at times, nonsensical words or phrases to be inadvertently transcribed.  Although I have reviewed the note from such areas, some may still exist.

## 2021-04-15 ENCOUNTER — TELEPHONE (OUTPATIENT)
Dept: SURGERY | Facility: CLINIC | Age: 46
End: 2021-04-15

## 2021-04-15 NOTE — TELEPHONE ENCOUNTER
Scheduled Right 3D Screen Mammo at [facility] 11-4-21 at 8:30    Return to see Dr DOE  11-15-21 arrive 8:15    Spoke to patient she v/arlyn Hart

## 2021-04-19 ENCOUNTER — CONSULT (OUTPATIENT)
Dept: ONCOLOGY | Facility: CLINIC | Age: 46
End: 2021-04-19

## 2021-04-19 ENCOUNTER — LAB (OUTPATIENT)
Dept: LAB | Facility: HOSPITAL | Age: 46
End: 2021-04-19

## 2021-04-19 VITALS
WEIGHT: 214.6 LBS | DIASTOLIC BLOOD PRESSURE: 76 MMHG | HEIGHT: 65 IN | OXYGEN SATURATION: 99 % | SYSTOLIC BLOOD PRESSURE: 129 MMHG | TEMPERATURE: 97.5 F | BODY MASS INDEX: 35.75 KG/M2 | HEART RATE: 76 BPM | RESPIRATION RATE: 18 BRPM

## 2021-04-19 DIAGNOSIS — Z78.0 POSTMENOPAUSAL: ICD-10-CM

## 2021-04-19 DIAGNOSIS — N60.92 ATYPICAL DUCTAL HYPERPLASIA OF LEFT BREAST: Primary | ICD-10-CM

## 2021-04-19 DIAGNOSIS — N60.92 ATYPICAL DUCTAL HYPERPLASIA OF LEFT BREAST: ICD-10-CM

## 2021-04-19 LAB
ALBUMIN SERPL-MCNC: 4.6 G/DL (ref 3.5–5.2)
ALBUMIN/GLOB SERPL: 1.5 G/DL (ref 1.1–2.4)
ALP SERPL-CCNC: 104 U/L (ref 38–116)
ALT SERPL W P-5'-P-CCNC: 27 U/L (ref 0–33)
ANION GAP SERPL CALCULATED.3IONS-SCNC: 9.2 MMOL/L (ref 5–15)
AST SERPL-CCNC: 15 U/L (ref 0–32)
BASOPHILS # BLD AUTO: 0.03 10*3/MM3 (ref 0–0.2)
BASOPHILS NFR BLD AUTO: 0.5 % (ref 0–1.5)
BILIRUB SERPL-MCNC: 0.6 MG/DL (ref 0.2–1.2)
BUN SERPL-MCNC: 10 MG/DL (ref 6–20)
BUN/CREAT SERPL: 14.9 (ref 7.3–30)
CALCIUM SPEC-SCNC: 9.7 MG/DL (ref 8.5–10.2)
CHLORIDE SERPL-SCNC: 105 MMOL/L (ref 98–107)
CO2 SERPL-SCNC: 25.8 MMOL/L (ref 22–29)
CREAT SERPL-MCNC: 0.67 MG/DL (ref 0.6–1.1)
DEPRECATED RDW RBC AUTO: 43.8 FL (ref 37–54)
EOSINOPHIL # BLD AUTO: 0.56 10*3/MM3 (ref 0–0.4)
EOSINOPHIL NFR BLD AUTO: 9.8 % (ref 0.3–6.2)
ERYTHROCYTE [DISTWIDTH] IN BLOOD BY AUTOMATED COUNT: 13 % (ref 12.3–15.4)
GFR SERPL CREATININE-BSD FRML MDRD: 115 ML/MIN/1.73
GFR SERPL CREATININE-BSD FRML MDRD: 95 ML/MIN/1.73
GLOBULIN UR ELPH-MCNC: 3.1 GM/DL (ref 1.8–3.5)
GLUCOSE SERPL-MCNC: 98 MG/DL (ref 74–124)
HCT VFR BLD AUTO: 42.9 % (ref 34–46.6)
HGB BLD-MCNC: 14.3 G/DL (ref 12–15.9)
IMM GRANULOCYTES # BLD AUTO: 0.02 10*3/MM3 (ref 0–0.05)
IMM GRANULOCYTES NFR BLD AUTO: 0.4 % (ref 0–0.5)
LYMPHOCYTES # BLD AUTO: 2.16 10*3/MM3 (ref 0.7–3.1)
LYMPHOCYTES NFR BLD AUTO: 38 % (ref 19.6–45.3)
MCH RBC QN AUTO: 30.8 PG (ref 26.6–33)
MCHC RBC AUTO-ENTMCNC: 33.3 G/DL (ref 31.5–35.7)
MCV RBC AUTO: 92.3 FL (ref 79–97)
MONOCYTES # BLD AUTO: 0.45 10*3/MM3 (ref 0.1–0.9)
MONOCYTES NFR BLD AUTO: 7.9 % (ref 5–12)
NEUTROPHILS NFR BLD AUTO: 2.47 10*3/MM3 (ref 1.7–7)
NEUTROPHILS NFR BLD AUTO: 43.4 % (ref 42.7–76)
NRBC BLD AUTO-RTO: 0 /100 WBC (ref 0–0.2)
PLATELET # BLD AUTO: 340 10*3/MM3 (ref 140–450)
PMV BLD AUTO: 9.8 FL (ref 6–12)
POTASSIUM SERPL-SCNC: 4 MMOL/L (ref 3.5–4.7)
PROT SERPL-MCNC: 7.7 G/DL (ref 6.3–8)
RBC # BLD AUTO: 4.65 10*6/MM3 (ref 3.77–5.28)
SODIUM SERPL-SCNC: 140 MMOL/L (ref 134–145)
WBC # BLD AUTO: 5.69 10*3/MM3 (ref 3.4–10.8)

## 2021-04-19 PROCEDURE — 36415 COLL VENOUS BLD VENIPUNCTURE: CPT

## 2021-04-19 PROCEDURE — 80053 COMPREHEN METABOLIC PANEL: CPT

## 2021-04-19 PROCEDURE — 99205 OFFICE O/P NEW HI 60 MIN: CPT | Performed by: INTERNAL MEDICINE

## 2021-04-19 PROCEDURE — 85025 COMPLETE CBC W/AUTO DIFF WBC: CPT

## 2021-04-19 RX ORDER — ANASTROZOLE 1 MG/1
1 TABLET ORAL DAILY
Qty: 30 TABLET | Refills: 3 | Status: SHIPPED | OUTPATIENT
Start: 2021-04-19 | End: 2021-07-28

## 2021-04-19 NOTE — PROGRESS NOTES
Subjective   Ernestina Urrutia is a 46 y.o. female.  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  Prior to noticing the nipple discharge patient had a negative screening mammogram in November 2020.      12/15/2020-left diagnostic mammogram and ultrasound was performed for further evaluation of nipple discharge  On the mammogram the breasts were noted to be heterogeneously dense.  Benign-appearing calcifications were noted.  Multiple masses and/or ducts in the retroareolar left breast were noted.    Ultrasound-at 6 o'clock position 1 cm from the nipple there was a 0.5 x 0.4 x 0.5 cm complicated cyst versus solid mass contiguous with the duct was noted.  There were numerous benign-appearing cysts and ducts.  MRI was recommended for further evaluation.  Also ultrasound-guided biopsy of the 6 o'clock position mass was recommended.    1/5/2021-ultrasound-guided biopsy-pathology consistent with atypical ductal hyperplasia bordering on low-grade ductal carcinoma in situ.  Consultation was obtained from Dr. Cristopher Rasmussen and pathology was consistent with low-grade ductal carcinoma in situ, micropapillary type, atypical lobular hyperplasia, sclerosing adenosis and intraductal papilloma was noted.    1/25/2021-bilateral breast MRI-biopsy clip noted at 6 o'clock position of the left breast.  No suspicious masses or enhancement noted at this location.  Suspicious 1.2 cm focal area of clumped non-mass enhancement noted at 9 o'clock position.  MRI guided core needle biopsy recommended.  In the right breast there is a 2.1 x 1.4 x 1.3 cm cyst.    1/25/2021-right breast diagnostic mammogram and ultrasound-heterogeneously dense breast.  No suspicious masses or calcifications.  Ultrasound-at 6 o'clock position retroareolar there is a 2 x 1.2 x 1.7 cm  benign-appearing complicated cyst with mobile internal debris.  Adjacent smaller cysts and ectatic ducts noted.    2/9/2021-MRI guided biopsy of the 9 o'clock position mass-pathology consistent with preoperative breast parenchyma with sclerotic intraductal papillomas.  No atypical ductal hyperplasia or lobular hyperplasia or in situ or invasive carcinoma.    Patient underwent a left breast mastectomy and sentinel lymph node biopsy on 3/25/2021.  Pathology did not show any residual ductal carcinoma in situ or invasive carcinoma.  Stage pTis N0 M0, staging based on the presence of DCIS on the core biopsy.  DCIS of the core biopsy measured 3 mm.  DCIS was low-grade, ER +%, NC +51-60%.Multiple intraductal papillomas were noted.    She has been referred to discuss adjuvant endocrine therapy/risk reduction    She had been on hormone replacement therapy for about 2 years since 2018 to end of 2020.  She underwent a total hysterectomy in 2018.  This was stopped after diagnosis of DCIS.  She reports occasional hot flashes but no other menopausal symptoms at this time.  She does not have any family history of breast cancer.    She is recovering well from surgery.    The following portions of the patient's history were reviewed and updated as appropriate: allergies, current medications, past family history, past medical history, past social history, past surgical history and problem list.    Past Medical History:   Diagnosis Date   • Acid reflux    • Breast cyst    • DCIS (ductal carcinoma in situ)     LEFT BREAST   • Elevated cholesterol    • Fibrocystic breast    • History of hematuria     MICROSCOPIC   • History of kidney stones    • History of migraine    • Knee pain, left    • Rosacea    • Seasonal allergies         Past Surgical History:   Procedure Laterality Date   • BILATERAL OOPHORECTOMY     • BREAST BIOPSY Left 01/05/2021   • BREAST RECONSTRUCTION Left 3/25/2021    Procedure: LEFT PLACEMENT TISSUE EXPANDER AND  ALLODERM;  Surgeon: Clifford Casper MD;  Location: [facility] OR;  Service: Plastics;  Laterality: Left;   • CHOLECYSTECTOMY     • CYSTOSCOPY     • HYSTERECTOMY     • MASTECTOMY W/ SENTINEL NODE BIOPSY Left 3/25/2021    Procedure: LEFT mastectomy, LEFT axilla sentinel node biopsy,  with or without reconstruction as her surgical treatment.;  Surgeon: Zenia Washington MD;  Location: [facility] OR;  Service: General;  Laterality: Left;   • MRI BREAST BIOPSY UNILATERAL Left 02/10/2021   • OOPHORECTOMY          Family History   Problem Relation Age of Onset   • Heart disease Mother    • Hypertension Mother    • Hyperlipidemia Mother    • Malig Hyperthermia Neg Hx         Social History     Socioeconomic History   • Marital status:      Spouse name: Not on file   • Number of children: Not on file   • Years of education: Not on file   • Highest education level: Not on file   Tobacco Use   • Smoking status: Never Smoker   • Smokeless tobacco: Never Used   Vaping Use   • Vaping Use: Never used   Substance and Sexual Activity   • Alcohol use: Not Currently     Comment: rare-once a year   • Drug use: Never        OB History             Para        Term   0            AB        Living           SAB        TAB        Ectopic        Molar        Multiple        Live Births                   Age at menarche 12  Age at menopause-43  Patient does not have any children  She used birth control pills for 2 years  Hormone replacement therapy from 2018 to 2020    No Known Allergies         Review of Systems   Constitutional: Negative.    HENT: Negative.    Eyes: Negative.    Respiratory: Negative.    Cardiovascular: Negative.    Gastrointestinal: Negative.    Endocrine: Positive for heat intolerance.   Genitourinary: Negative.    Musculoskeletal: Negative.    Allergic/Immunologic: Negative.    Neurological: Negative.    Hematological: Negative.    Psychiatric/Behavioral: Negative.   "        Objective   Blood pressure 129/76, pulse 76, temperature 97.5 °F (36.4 °C), temperature source Temporal, resp. rate 18, height 165.1 cm (65\"), weight 97.3 kg (214 lb 9.6 oz), SpO2 99 %, not currently breastfeeding.   Physical Exam  Vitals reviewed.   Constitutional:       General: She is not in acute distress.     Appearance: Normal appearance. She is not ill-appearing, toxic-appearing or diaphoretic.   HENT:      Head: Normocephalic and atraumatic.      Right Ear: External ear normal.      Left Ear: External ear normal.      Nose: Nose normal.      Mouth/Throat:      Mouth: Mucous membranes are moist.      Pharynx: Oropharynx is clear.   Eyes:      Extraocular Movements: Extraocular movements intact.      Conjunctiva/sclera: Conjunctivae normal.      Pupils: Pupils are equal, round, and reactive to light.   Cardiovascular:      Rate and Rhythm: Normal rate.   Pulmonary:      Effort: Pulmonary effort is normal.      Breath sounds: Normal breath sounds.   Abdominal:      General: Abdomen is flat. Bowel sounds are normal.      Palpations: Abdomen is soft.   Musculoskeletal:         General: Normal range of motion.      Cervical back: Normal range of motion.   Skin:     General: Skin is warm.   Neurological:      General: No focal deficit present.      Mental Status: She is alert.   Psychiatric:         Mood and Affect: Mood normal.         Behavior: Behavior normal.         Thought Content: Thought content normal.         Judgment: Judgment normal.       Breast Exam: Right breast appears normal on inspection.  No palpable abnormalities of the right breast.  Left breast is status post mastectomy with an incision which is healing well.  Expanders in place.    Lab on 04/19/2021   Component Date Value Ref Range Status   • WBC 04/19/2021 5.69  3.40 - 10.80 10*3/mm3 Final   • RBC 04/19/2021 4.65  3.77 - 5.28 10*6/mm3 Final   • Hemoglobin 04/19/2021 14.3  12.0 - 15.9 g/dL Final   • Hematocrit 04/19/2021 42.9  34.0 - " 46.6 % Final   • MCV 04/19/2021 92.3  79.0 - 97.0 fL Final   • MCH 04/19/2021 30.8  26.6 - 33.0 pg Final   • MCHC 04/19/2021 33.3  31.5 - 35.7 g/dL Final   • RDW 04/19/2021 13.0  12.3 - 15.4 % Final   • RDW-SD 04/19/2021 43.8  37.0 - 54.0 fl Final   • MPV 04/19/2021 9.8  6.0 - 12.0 fL Final   • Platelets 04/19/2021 340  140 - 450 10*3/mm3 Final   • Neutrophil % 04/19/2021 43.4  42.7 - 76.0 % Final   • Lymphocyte % 04/19/2021 38.0  19.6 - 45.3 % Final   • Monocyte % 04/19/2021 7.9  5.0 - 12.0 % Final   • Eosinophil % 04/19/2021 9.8* 0.3 - 6.2 % Final   • Basophil % 04/19/2021 0.5  0.0 - 1.5 % Final   • Immature Grans % 04/19/2021 0.4  0.0 - 0.5 % Final   • Neutrophils, Absolute 04/19/2021 2.47  1.70 - 7.00 10*3/mm3 Final   • Lymphocytes, Absolute 04/19/2021 2.16  0.70 - 3.10 10*3/mm3 Final   • Monocytes, Absolute 04/19/2021 0.45  0.10 - 0.90 10*3/mm3 Final   • Eosinophils, Absolute 04/19/2021 0.56* 0.00 - 0.40 10*3/mm3 Final   • Basophils, Absolute 04/19/2021 0.03  0.00 - 0.20 10*3/mm3 Final   • Immature Grans, Absolute 04/19/2021 0.02  0.00 - 0.05 10*3/mm3 Final   • nRBC 04/19/2021 0.0  0.0 - 0.2 /100 WBC Final   Admission on 03/25/2021, Discharged on 03/26/2021   Component Date Value Ref Range Status   • Case Report 03/25/2021    Final                    Value:Surgical Pathology Report                         Case: [id number]                                  Authorizing Provider:  eZnia Washington MD    Collected:           03/25/2021 11:44 AM          Ordering Location:     [facility]  Received:            03/25/2021 12:08 PM                                 MAIN OR                                                                      Pathologist:           Magdalena Fernando MD                                                          Specimens:   1) - Breast, Left, LEFT TOTAL MASTECTOMY - STITCH MARKS 12 O'CLOCK. FRESH FOR                       PERMANENT. CALL 8897. 912.6 grams.                                                                   2) - [place] Lymph Node, SN #1                                                                     3) - Breast, Left, Left Breast Skin                                                       • Final Diagnosis 03/25/2021    Final                    Value:This result contains rich text formatting which cannot be displayed here.   • Synoptic Checklist 03/25/2021    Final                    Value:DCIS OF THE BREAST: Resection  (DCIS OF THE BREAST: COMPLETE EXCISION - All Specimens)                                                        8th Edition - Protocol posted: 2/26/2020                                                        SPECIMEN                               Procedure:    Total mastectomy                                Specimen Laterality:    Left                                                         TUMOR                               Tumor Site:    Clock position                                :    6 o'clock                              Histologic Type:    Ductal carcinoma in situ                              Size (Extent) of DCIS:    Estimated size (extent) of DCIS is at least (Millimeters): 3 (on core biopsy [id number]) mm                             Architectural Patterns:    Micropapillary                              Nuclear Grade:    Grade I (low)                              Necrosis:    Not identified                              Microcalcifications:    Present in nonneoplastic tissue                                                         MARGINS                             Margins:    Uninvolved by DCIS                                Distance from Closest Margin (Millimeters):    15 (measured from bowtie clip) mm                               Closest Margin(s):    Anterior                                                         LYMPH NODES                             Regional Lymph Nodes:    Uninvolved by tumor cells                                Total  Number of Lymph Nodes Examined:    1                                Number of [place] Nodes Examined:    1                                                         PATHOLOGIC STAGE CLASSIFICATION (pTNM, AJCC 8th Edition)                                                            Primary Tumor (pT):    pTis (DCIS)                              Regional Lymph Nodes Modifier:    (sn): [place] node(s) evaluated                              Regional Lymph Nodes (pN):    pN0                                                         SPECIAL STUDIES                             Breast Biomarker Testing Performed on Previous Biopsy:                                   Estrogen Receptor (ER) Status:    Positive                                  Percentage of Cells with Nuclear Positivity:    %                              Breast Biomarker Testing Performed on Previous Biopsy:                                   Progesterone Receptor (PgR) Status:    Positive                                  Percentage of Cells with Nuclear Positivity:    51-60%                                Testing Performed on Case Number:    [id number]      • Comment 03/25/2021    Final                    Value:This result contains rich text formatting which cannot be displayed here.   • Gross Description 03/25/2021    Final                    Value:This result contains rich text formatting which cannot be displayed here.   • Special Stains 03/25/2021    Final                    Value:This result contains rich text formatting which cannot be displayed here.   • Microscopic Description 03/25/2021    Final                    Value:This result contains rich text formatting which cannot be displayed here.   • Case Report 03/26/2021    Final                    Value:Surgical Pathology Report                         Case: [id number]                                  Authorizing Provider:  Zenia Washington MD    Collected:           03/25/2021 11:44 AM           [facility]  Received:            03/25/2021 12:08 PM                                 MAIN OR                                                                      Pathologist:           Magdalena Fernando MD                                                          Specimens:   1) - Breast, Left, LEFT TOTAL MASTECTOMY - STITCH MARKS 12 O'CLOCK. FRESH FOR                       PERMANENT. CALL 8818. 912.6 grams.                                                                  2) - [place] Lymph Node, SN #1                                                                     3) - Breast, Left, Left Breast Skin                                                       • Final Diagnosis 03/26/2021    Final                    Value:This result contains rich text formatting which cannot be displayed here.   • Synoptic Checklist 03/26/2021    Final                    Value:DCIS OF THE BREAST: Resection  (DCIS OF THE BREAST: COMPLETE EXCISION - All Specimens)                                                        8th Edition - Protocol posted: 2/26/2020                                                        SPECIMEN                               Procedure:    Total mastectomy                                Specimen Laterality:    Left                                                         TUMOR                               Tumor Site:    Clock position                                :    6 o'clock                              Histologic Type:    Ductal carcinoma in situ                              Size (Extent) of DCIS:    Estimated size (extent) of DCIS is at least (Millimeters): 3 (on core biopsy [id number]) mm                             Architectural Patterns:    Micropapillary                              Nuclear Grade:    Grade I (low)                              Necrosis:    Not identified                              Microcalcifications:    Present in nonneoplastic tissue                                                          MARGINS                             Margins:    Uninvolved by DCIS                                Distance from Closest Margin (Millimeters):    15 (measured from bowtie clip) mm                               Closest Margin(s):    Anterior                                                         LYMPH NODES                             Regional Lymph Nodes:    Uninvolved by tumor cells                                Total Number of Lymph Nodes Examined:    1                                Number of [place] Nodes Examined:    1                                                         PATHOLOGIC STAGE CLASSIFICATION (pTNM, AJCC 8th Edition)                                                            Primary Tumor (pT):    pTis (DCIS)                              Regional Lymph Nodes Modifier:    (sn): [place] node(s) evaluated                              Regional Lymph Nodes (pN):    pN0                                                         SPECIAL STUDIES                             Breast Biomarker Testing Performed on Previous Biopsy:                                   Estrogen Receptor (ER) Status:    Positive                                  Percentage of Cells with Nuclear Positivity:    %                              Breast Biomarker Testing Performed on Previous Biopsy:                                   Progesterone Receptor (PgR) Status:    Positive                                  Percentage of Cells with Nuclear Positivity:    51-60%                                Testing Performed on Case Number:    [id number]      • Comment 03/26/2021    Final                    Value:This result contains rich text formatting which cannot be displayed here.   • Gross Description 03/26/2021    Final                    Value:This result contains rich text formatting which cannot be displayed here.   • Special Stains 03/26/2021    Final                    Value:This result  contains rich text formatting which cannot be displayed here.   • Microscopic Description 03/26/2021    Final                    Value:This result contains rich text formatting which cannot be displayed here.   Lab on 03/23/2021   Component Date Value Ref Range Status   • SARS-CoV-2 PCR 03/23/2021 Not Detected  Not Detected Final    Nucleic acid specific to SARS-CoV-2 (COVID-19) virus was not detected in  this sample by the TaqPath (TM) COVID-19 Combo Kit.          SARS-CoV-2 (COVID-19) nucleic acid testing performed using [employer] Aptima (R) SARS-CoV-2 Assay or [employer] TaqPath (TM)  COVID-19 Combo Kit as indicated above under Emergency Use Authorization (EUA) from the FDA. Aptima (R) and TaqPath (TM) test performance  characteristics verified by [facility] in accordance with the FDAs Guidance Document (Policy for Diagnostic Tests for Coronavirus Disease-2019  during the Public Health Emergency) issued on March 16, 2020. The laboratory is regulated under CLIA as qualified to perform high-complexity testing  Unless otherwise noted, all testing was performed at [facility], CLIA No. [id number], [place] State Licensee No. [id number]        No radiology results for the last 30 days.     4/19/2021  CBC-WBC 5.69, hemoglobin 14.3, platelets 340,000  CMP-BUN 10, creatinine 0.67, potassium 4, other electrolytes normal, LFTs normal.    11/3/2020-bilateral screening mammogram, 12/15/2020 diagnostic mammogram of the left breast and ultrasound, 1/25/2021 MRI of the breast, 1/25/2021 right breast diagnostic mammogram and ultrasound-images have been independently reviewed and interpreted by me and details have been summarized in HPI.      Assessment/Plan        46-year-old postmenopausal  lady with no significant past medical history presents with left breast ductal carcinoma in situ status post mastectomy    *DCIS of the left breast  · ER/MN positive  · Status post left breast mastectomy and  reconstruction  · Discussed the pathology at length with Ms. Urrutia and explained that in DCIS the malignant cells are contained to the milk ducts.  Explained to her that there is no risk of distant recurrence with DCIS however there is increased risk of local recurrence as well as development of new cancer in ipsilateral and contralateral breast.  · Given that she had a left mastectomy risk of recurrence of DCIS is extremely low  · We discussed about adjuvant endocrine therapy to help prevent new DCIS and new invasive cancer in the right breast.  · Discussed both options including anastrozole and tamoxifen for risk reduction.  · Since she is postmenopausal would proceed with anastrozole.  · Adverse effects of anastrozole discussed including but not limited to hot flashes, vaginal dryness, sexual dysfunction, mood changes, decrease in bone mineral density, arthralgias myalgias, cardiovascular risk    *Bone health-DEXA scan will be obtained prior to next visit.  Recommend calcium and vitamin    *Weight gain-patient is concerned about weight gain with anastrozole.  We discussed diet and exercise to help with weight gain.  Offered dietitian referral and patient wants to try managing her weight on her own first.    *Screening-continue with annual screening mammograms of the right breast.    *Follow-up-6 months    Total of 65 minutes have been spent on the encounter including reviewing the medical records, face-to-face time and documentation on the same day

## 2021-07-14 ENCOUNTER — PRE-ADMISSION TESTING (OUTPATIENT)
Dept: PREADMISSION TESTING | Facility: HOSPITAL | Age: 46
End: 2021-07-14

## 2021-07-14 VITALS
TEMPERATURE: 98.2 F | HEIGHT: 65 IN | OXYGEN SATURATION: 99 % | RESPIRATION RATE: 16 BRPM | BODY MASS INDEX: 34.2 KG/M2 | DIASTOLIC BLOOD PRESSURE: 75 MMHG | WEIGHT: 205.3 LBS | HEART RATE: 53 BPM | SYSTOLIC BLOOD PRESSURE: 112 MMHG

## 2021-07-14 LAB
ANION GAP SERPL CALCULATED.3IONS-SCNC: 9.4 MMOL/L (ref 5–15)
BUN SERPL-MCNC: 7 MG/DL (ref 6–20)
BUN/CREAT SERPL: 10 (ref 7–25)
CALCIUM SPEC-SCNC: 9.4 MG/DL (ref 8.6–10.5)
CHLORIDE SERPL-SCNC: 106 MMOL/L (ref 98–107)
CO2 SERPL-SCNC: 26.6 MMOL/L (ref 22–29)
CREAT SERPL-MCNC: 0.7 MG/DL (ref 0.57–1)
DEPRECATED RDW RBC AUTO: 45.6 FL (ref 37–54)
ERYTHROCYTE [DISTWIDTH] IN BLOOD BY AUTOMATED COUNT: 13.5 % (ref 12.3–15.4)
GFR SERPL CREATININE-BSD FRML MDRD: 90 ML/MIN/1.73
GLUCOSE SERPL-MCNC: 95 MG/DL (ref 65–99)
HCT VFR BLD AUTO: 40.7 % (ref 34–46.6)
HGB BLD-MCNC: 13.4 G/DL (ref 12–15.9)
MCH RBC QN AUTO: 30 PG (ref 26.6–33)
MCHC RBC AUTO-ENTMCNC: 32.9 G/DL (ref 31.5–35.7)
MCV RBC AUTO: 91.3 FL (ref 79–97)
PLATELET # BLD AUTO: 326 10*3/MM3 (ref 140–450)
PMV BLD AUTO: 10.9 FL (ref 6–12)
POTASSIUM SERPL-SCNC: 4.1 MMOL/L (ref 3.5–5.2)
RBC # BLD AUTO: 4.46 10*6/MM3 (ref 3.77–5.28)
SARS-COV-2 ORF1AB RESP QL NAA+PROBE: NOT DETECTED
SODIUM SERPL-SCNC: 142 MMOL/L (ref 136–145)
WBC # BLD AUTO: 5.51 10*3/MM3 (ref 3.4–10.8)

## 2021-07-14 PROCEDURE — 36415 COLL VENOUS BLD VENIPUNCTURE: CPT

## 2021-07-14 PROCEDURE — 85027 COMPLETE CBC AUTOMATED: CPT

## 2021-07-14 PROCEDURE — 80048 BASIC METABOLIC PNL TOTAL CA: CPT

## 2021-07-14 PROCEDURE — C9803 HOPD COVID-19 SPEC COLLECT: HCPCS | Performed by: NURSE PRACTITIONER

## 2021-07-14 PROCEDURE — U0004 COV-19 TEST NON-CDC HGH THRU: HCPCS | Performed by: NURSE PRACTITIONER

## 2021-07-14 RX ORDER — CHLORHEXIDINE GLUCONATE 500 MG/1
CLOTH TOPICAL
COMMUNITY
End: 2021-07-16 | Stop reason: HOSPADM

## 2021-07-14 NOTE — DISCHARGE INSTRUCTIONS
Take the following medications the morning of surgery:    NONE    ARRIVE AT 1000.      If you are on prescription narcotic pain medication to control your pain you may also take that medication the morning of surgery.    General Instructions:  • Do not eat solid food after midnight the night before surgery.  • You may drink clear liquids day of surgery but must stop at least one hour before your hospital arrival time.  • It is beneficial for you to have a clear drink that contains carbohydrates the day of surgery.  We suggest a 12 to 20 ounce bottle of Gatorade or Powerade for non-diabetic patients or a 12 to 20 ounce bottle of G2 or Powerade Zero for diabetic patients. (Pediatric patients, are not advised to drink a 12 to 20 ounce carbohydrate drink)    Clear liquids are liquids you can see through.  Nothing red in color.     Plain water                               Sports drinks  Sodas                                   Gelatin (Jell-O)  Fruit juices without pulp such as white grape juice and apple juice  Popsicles that contain no fruit or yogurt  Tea or coffee (no cream or milk added)  Gatorade / Powerade  G2 / Powerade Zero    • Infants may have breast milk up to four hours before surgery.  • Infants drinking formula may drink formula up to six hours before surgery.   • Patients who avoid smoking, chewing tobacco and alcohol for 4 weeks prior to surgery have a reduced risk of post-operative complications.  Quit smoking as many days before surgery as you can.  • Do not smoke, use chewing tobacco or drink alcohol the day of surgery.   • If applicable bring your C-PAP/ BI-PAP machine.  • Bring any papers given to you in the doctor’s office.  • Wear clean comfortable clothes.  • Do not wear contact lenses, false eyelashes or make-up.  Bring a case for your glasses.   • Bring crutches or walker if applicable.  • Remove all piercings.  Leave jewelry and any other valuables at home.  • Hair extensions with metal clips  must be removed prior to surgery.  • The Pre-Admission Testing nurse will instruct you to bring medications if unable to obtain an accurate list in Pre-Admission Testing.        If you were given a blood bank ID arm band remember to bring it with you the day of surgery.    Preventing a Surgical Site Infection:  • For 2 to 3 days before surgery, avoid shaving with a razor because the razor can irritate skin and make it easier to develop an infection.    • Any areas of open skin can increase the risk of a post-operative wound infection by allowing bacteria to enter and travel throughout the body.  Notify your surgeon if you have any skin wounds / rashes even if it is not near the expected surgical site.  The area will need assessed to determine if surgery should be delayed until it is healed.  • The night prior to surgery shower using a fresh bar of anti-bacterial soap (such as Dial) and clean washcloth.  Sleep in a clean bed with clean clothing.  Do not allow pets to sleep with you.  • Shower on the morning of surgery using a fresh bar of anti-bacterial soap (such as Dial) and clean washcloth.  Dry with a clean towel and dress in clean clothing.  • Ask your surgeon if you will be receiving antibiotics prior to surgery.  • Make sure you, your family, and all healthcare providers clean their hands with soap and water or an alcohol based hand  before caring for you or your wound.    Day of surgery:  Your arrival time is approximately two hours before your scheduled surgery time.  Upon arrival, a Pre-op nurse and Anesthesiologist will review your health history, obtain vital signs, and answer questions you may have.  The only belongings needed at this time will be a list of your home medications and if applicable your C-PAP/BI-PAP machine.  A Pre-op nurse will start an IV and you may receive medication in preparation for surgery, including something to help you relax.     Please be aware that surgery does come  with discomfort.  We want to make every effort to control your discomfort so please discuss any uncontrolled symptoms with your nurse.   Your doctor will most likely have prescribed pain medications.      If you are going home after surgery you will receive individualized written care instructions before being discharged.  A responsible adult must drive you to and from the hospital on the day of your surgery and stay with you for 24 hours.  Discharge prescriptions can be filled by the hospital pharmacy during regular pharmacy hours.  If you are having surgery late in the day/evening your prescription may be e-prescribed to your pharmacy.  Please verify your pharmacy hours or chose a 24 hour pharmacy to avoid not having access to your prescription because your pharmacy has closed for the day.    If you are staying overnight following surgery, you will be transported to your hospital room following the recovery period.  [facility] has all private rooms.    If you have any questions please call Pre-Admission Testing at [phone].  Deductibles and co-payments are collected on the day of service. Please be prepared to pay the required co-pay, deductible or deposit on the day of service as defined by your plan.    Patient Education for Self-Quarantine Process    • Following your COVID testing, we strongly recommend that you wear a mask when you are with other people and practice social distancing.   • Limit your activities to only required outings.  • Wash your hands with soap and water frequently for at least 20 seconds.   • Avoid touching your eyes, nose and mouth with unwashed hands.  • Do not share anything - utensils, drinking glasses, food from the same bowl.   • Sanitize household surfaces daily. Include all high touch areas (door handles, light switches, phones, countertops, etc.)    Call your surgeon immediately if you experience any of the following symptoms:  • Sore Throat  • Shortness of  Breath or difficulty breathing  • Cough  • Chills  • Body soreness or muscle pain  • Headache  • Fever  • New loss of taste or smell  • Do not arrive for your surgery ill.  Your procedure will need to be rescheduled to another time.  You will need to call your physician before the day of surgery to avoid any unnecessary exposure to hospital staff as well as other patients.      CHLORHEXIDINE CLOTH INSTRUCTIONS  The morning of surgery follow these instructions using the Chlorhexidine cloths you've been given.  These steps reduce bacteria on the body.  Do not use the cloths near your eyes, ears mouth, genitalia or on open wounds.  Throw the cloths away after use but do not try to flush them down a toilet.      • Open and remove one cloth at a time from the package.    • Leave the cloth unfolded and begin the bathing.  • Massage the skin with the cloths using gentle pressure to remove bacteria.  Do not scrub harshly.   • Follow the steps below with one 2% CHG cloth per area (6 total cloths).  • One cloth for neck, shoulders and chest.  • One cloth for both arms, hands, fingers and underarms (do underarms last).  • One cloth for the abdomen followed by groin.  • One cloth for right leg and foot including between the toes.  • One cloth for left leg and foot including between the toes.  • The last cloth is to be used for the back of the neck, back and buttocks.    Allow the CHG to air dry 3 minutes on the skin which will give it time to work and decrease the chance of irritation.  The skin may feel sticky until it is dry.  Do not rinse with water or any other liquid or you will lose the beneficial effects of the CHG.  If mild skin irritation occurs, do rinse the skin to remove the CHG.  Report this to the nurse at time of admission.  Do not apply lotions, creams, ointments, deodorants or perfumes after using the clothes. Dress in clean clothes before coming to the hospital.

## 2021-07-16 ENCOUNTER — HOSPITAL ENCOUNTER (OUTPATIENT)
Facility: HOSPITAL | Age: 46
Setting detail: HOSPITAL OUTPATIENT SURGERY
Discharge: HOME | End: 2021-07-16
Attending: PLASTIC SURGERY | Admitting: PLASTIC SURGERY

## 2021-07-16 ENCOUNTER — ANESTHESIA EVENT (OUTPATIENT)
Dept: PERIOP | Facility: HOSPITAL | Age: 46
End: 2021-07-16

## 2021-07-16 ENCOUNTER — ANESTHESIA (OUTPATIENT)
Dept: PERIOP | Facility: HOSPITAL | Age: 46
End: 2021-07-16

## 2021-07-16 VITALS
RESPIRATION RATE: 16 BRPM | DIASTOLIC BLOOD PRESSURE: 96 MMHG | HEIGHT: 65 IN | SYSTOLIC BLOOD PRESSURE: 147 MMHG | BODY MASS INDEX: 34.24 KG/M2 | OXYGEN SATURATION: 100 % | WEIGHT: 205.5 LBS | HEART RATE: 73 BPM | TEMPERATURE: 98.1 F

## 2021-07-16 DIAGNOSIS — N60.92 ATYPICAL DUCTAL HYPERPLASIA OF LEFT BREAST: Primary | ICD-10-CM

## 2021-07-16 DIAGNOSIS — D24.2 PAPILLOMA OF LEFT BREAST: ICD-10-CM

## 2021-07-16 DIAGNOSIS — Z85.3 HISTORY OF BREAST CANCER: ICD-10-CM

## 2021-07-16 PROCEDURE — 88302 TISSUE EXAM BY PATHOLOGIST: CPT | Performed by: PLASTIC SURGERY

## 2021-07-16 PROCEDURE — 25010000002 EPINEPHRINE PER 0.1 MG: Performed by: PLASTIC SURGERY

## 2021-07-16 PROCEDURE — 25010000002 GENTAMICIN PER 80 MG: Performed by: PLASTIC SURGERY

## 2021-07-16 PROCEDURE — 25010000003 LIDOCAINE 1 % SOLUTION 10 ML VIAL: Performed by: PLASTIC SURGERY

## 2021-07-16 PROCEDURE — 25010000002 FENTANYL CITRATE (PF) 50 MCG/ML SOLUTION: Performed by: NURSE ANESTHETIST, CERTIFIED REGISTERED

## 2021-07-16 PROCEDURE — 25010000002 HYDROMORPHONE PER 4 MG: Performed by: NURSE ANESTHETIST, CERTIFIED REGISTERED

## 2021-07-16 PROCEDURE — C1789 PROSTHESIS, BREAST, IMP: HCPCS | Performed by: PLASTIC SURGERY

## 2021-07-16 PROCEDURE — 25010000003 CEFAZOLIN PER 500 MG: Performed by: PLASTIC SURGERY

## 2021-07-16 PROCEDURE — 25010000002 ONDANSETRON PER 1 MG: Performed by: NURSE ANESTHETIST, CERTIFIED REGISTERED

## 2021-07-16 PROCEDURE — 63710000001 ONDANSETRON PER 8 MG: Performed by: PLASTIC SURGERY

## 2021-07-16 PROCEDURE — 25010000002 NEOSTIGMINE 5 MG/10ML SOLUTION: Performed by: NURSE ANESTHETIST, CERTIFIED REGISTERED

## 2021-07-16 PROCEDURE — 25010000002 DEXAMETHASONE PER 1 MG: Performed by: NURSE ANESTHETIST, CERTIFIED REGISTERED

## 2021-07-16 PROCEDURE — 25010000002 PROPOFOL 10 MG/ML EMULSION: Performed by: NURSE ANESTHETIST, CERTIFIED REGISTERED

## 2021-07-16 DEVICE — IMPLANTABLE DEVICE: Type: IMPLANTABLE DEVICE | Site: BREAST | Status: FUNCTIONAL

## 2021-07-16 RX ORDER — ROCURONIUM BROMIDE 10 MG/ML
INJECTION, SOLUTION INTRAVENOUS AS NEEDED
Status: DISCONTINUED | OUTPATIENT
Start: 2021-07-16 | End: 2021-07-16 | Stop reason: SURG

## 2021-07-16 RX ORDER — FLUMAZENIL 0.1 MG/ML
0.2 INJECTION INTRAVENOUS AS NEEDED
Status: DISCONTINUED | OUTPATIENT
Start: 2021-07-16 | End: 2021-07-16 | Stop reason: HOSPADM

## 2021-07-16 RX ORDER — FENTANYL CITRATE 50 UG/ML
50 INJECTION, SOLUTION INTRAMUSCULAR; INTRAVENOUS
Status: DISCONTINUED | OUTPATIENT
Start: 2021-07-16 | End: 2021-07-16 | Stop reason: HOSPADM

## 2021-07-16 RX ORDER — MAGNESIUM HYDROXIDE 1200 MG/15ML
LIQUID ORAL AS NEEDED
Status: DISCONTINUED | OUTPATIENT
Start: 2021-07-16 | End: 2021-07-16 | Stop reason: HOSPADM

## 2021-07-16 RX ORDER — DEXAMETHASONE SODIUM PHOSPHATE 10 MG/ML
INJECTION INTRAMUSCULAR; INTRAVENOUS AS NEEDED
Status: DISCONTINUED | OUTPATIENT
Start: 2021-07-16 | End: 2021-07-16 | Stop reason: SURG

## 2021-07-16 RX ORDER — GLYCOPYRROLATE 0.2 MG/ML
INJECTION INTRAMUSCULAR; INTRAVENOUS AS NEEDED
Status: DISCONTINUED | OUTPATIENT
Start: 2021-07-16 | End: 2021-07-16 | Stop reason: SURG

## 2021-07-16 RX ORDER — HYDROMORPHONE HCL 110MG/55ML
PATIENT CONTROLLED ANALGESIA SYRINGE INTRAVENOUS AS NEEDED
Status: DISCONTINUED | OUTPATIENT
Start: 2021-07-16 | End: 2021-07-16 | Stop reason: SURG

## 2021-07-16 RX ORDER — CELECOXIB 200 MG/1
400 CAPSULE ORAL ONCE
Status: COMPLETED | OUTPATIENT
Start: 2021-07-16 | End: 2021-07-16

## 2021-07-16 RX ORDER — SODIUM CHLORIDE 0.9 % (FLUSH) 0.9 %
10 SYRINGE (ML) INJECTION AS NEEDED
Status: DISCONTINUED | OUTPATIENT
Start: 2021-07-16 | End: 2021-07-16 | Stop reason: HOSPADM

## 2021-07-16 RX ORDER — HYDROCODONE BITARTRATE AND ACETAMINOPHEN 5; 325 MG/1; MG/1
1-2 TABLET ORAL EVERY 4 HOURS PRN
Qty: 15 TABLET | Refills: 0 | Status: SHIPPED | OUTPATIENT
Start: 2021-07-16 | End: 2021-10-08 | Stop reason: ALTCHOICE

## 2021-07-16 RX ORDER — OXYCODONE HYDROCHLORIDE 5 MG/1
10 TABLET ORAL ONCE
Status: COMPLETED | OUTPATIENT
Start: 2021-07-16 | End: 2021-07-16

## 2021-07-16 RX ORDER — PROMETHAZINE HYDROCHLORIDE 25 MG/1
25 SUPPOSITORY RECTAL ONCE AS NEEDED
Status: DISCONTINUED | OUTPATIENT
Start: 2021-07-16 | End: 2021-07-16 | Stop reason: HOSPADM

## 2021-07-16 RX ORDER — FAMOTIDINE 10 MG/ML
20 INJECTION, SOLUTION INTRAVENOUS
Status: COMPLETED | OUTPATIENT
Start: 2021-07-16 | End: 2021-07-16

## 2021-07-16 RX ORDER — PROMETHAZINE HYDROCHLORIDE 25 MG/1
25 TABLET ORAL ONCE AS NEEDED
Status: DISCONTINUED | OUTPATIENT
Start: 2021-07-16 | End: 2021-07-16 | Stop reason: HOSPADM

## 2021-07-16 RX ORDER — ONDANSETRON 8 MG/1
8 TABLET, ORALLY DISINTEGRATING ORAL EVERY 8 HOURS PRN
Qty: 10 TABLET | Refills: 1 | Status: SHIPPED | OUTPATIENT
Start: 2021-07-16 | End: 2021-10-08 | Stop reason: ALTCHOICE

## 2021-07-16 RX ORDER — ACETAMINOPHEN 500 MG
1000 TABLET ORAL ONCE
Status: COMPLETED | OUTPATIENT
Start: 2021-07-16 | End: 2021-07-16

## 2021-07-16 RX ORDER — DIPHENHYDRAMINE HYDROCHLORIDE 50 MG/ML
12.5 INJECTION INTRAMUSCULAR; INTRAVENOUS
Status: DISCONTINUED | OUTPATIENT
Start: 2021-07-16 | End: 2021-07-16 | Stop reason: HOSPADM

## 2021-07-16 RX ORDER — NALOXONE HCL 0.4 MG/ML
0.2 VIAL (ML) INJECTION AS NEEDED
Status: DISCONTINUED | OUTPATIENT
Start: 2021-07-16 | End: 2021-07-16 | Stop reason: HOSPADM

## 2021-07-16 RX ORDER — SODIUM CHLORIDE, SODIUM LACTATE, POTASSIUM CHLORIDE, CALCIUM CHLORIDE 600; 310; 30; 20 MG/100ML; MG/100ML; MG/100ML; MG/100ML
9 INJECTION, SOLUTION INTRAVENOUS CONTINUOUS PRN
Status: DISCONTINUED | OUTPATIENT
Start: 2021-07-16 | End: 2021-07-16 | Stop reason: HOSPADM

## 2021-07-16 RX ORDER — LIDOCAINE HYDROCHLORIDE 20 MG/ML
INJECTION, SOLUTION INFILTRATION; PERINEURAL AS NEEDED
Status: DISCONTINUED | OUTPATIENT
Start: 2021-07-16 | End: 2021-07-16 | Stop reason: SURG

## 2021-07-16 RX ORDER — AMOXICILLIN 250 MG
2 CAPSULE ORAL DAILY PRN
Qty: 30 TABLET | Refills: 1 | Status: SHIPPED | OUTPATIENT
Start: 2021-07-16 | End: 2021-10-08 | Stop reason: SDUPTHER

## 2021-07-16 RX ORDER — ONDANSETRON HYDROCHLORIDE 8 MG/1
8 TABLET, FILM COATED ORAL ONCE
Status: COMPLETED | OUTPATIENT
Start: 2021-07-16 | End: 2021-07-16

## 2021-07-16 RX ORDER — LABETALOL HYDROCHLORIDE 5 MG/ML
5 INJECTION, SOLUTION INTRAVENOUS
Status: DISCONTINUED | OUTPATIENT
Start: 2021-07-16 | End: 2021-07-16 | Stop reason: HOSPADM

## 2021-07-16 RX ORDER — DOCUSATE SODIUM 250 MG
250 CAPSULE ORAL 2 TIMES DAILY PRN
Qty: 15 CAPSULE | Refills: 1 | Status: SHIPPED | OUTPATIENT
Start: 2021-07-16 | End: 2021-10-08 | Stop reason: ALTCHOICE

## 2021-07-16 RX ORDER — DIPHENHYDRAMINE HCL 25 MG
25 CAPSULE ORAL
Status: DISCONTINUED | OUTPATIENT
Start: 2021-07-16 | End: 2021-07-16 | Stop reason: HOSPADM

## 2021-07-16 RX ORDER — GABAPENTIN 100 MG/1
100 CAPSULE ORAL EVERY 8 HOURS
Qty: 60 CAPSULE | Refills: 2 | Status: SHIPPED | OUTPATIENT
Start: 2021-07-16 | End: 2021-10-08 | Stop reason: ALTCHOICE

## 2021-07-16 RX ORDER — HYDROCODONE BITARTRATE AND ACETAMINOPHEN 7.5; 325 MG/1; MG/1
1 TABLET ORAL ONCE AS NEEDED
Status: DISCONTINUED | OUTPATIENT
Start: 2021-07-16 | End: 2021-07-16 | Stop reason: HOSPADM

## 2021-07-16 RX ORDER — DOXYCYCLINE 100 MG/1
100 CAPSULE ORAL 2 TIMES DAILY
Qty: 10 CAPSULE | Refills: 0 | Status: SHIPPED | OUTPATIENT
Start: 2021-07-16 | End: 2021-07-21

## 2021-07-16 RX ORDER — HYDROCODONE BITARTRATE AND ACETAMINOPHEN 5; 325 MG/1; MG/1
1 TABLET ORAL ONCE AS NEEDED
Status: CANCELLED | OUTPATIENT
Start: 2021-07-16 | End: 2021-07-23

## 2021-07-16 RX ORDER — EPHEDRINE SULFATE 50 MG/ML
5 INJECTION, SOLUTION INTRAVENOUS ONCE AS NEEDED
Status: DISCONTINUED | OUTPATIENT
Start: 2021-07-16 | End: 2021-07-16 | Stop reason: HOSPADM

## 2021-07-16 RX ORDER — SODIUM CHLORIDE, SODIUM LACTATE, POTASSIUM CHLORIDE, AND CALCIUM CHLORIDE .6; .31; .03; .02 G/100ML; G/100ML; G/100ML; G/100ML
IRRIGANT IRRIGATION AS NEEDED
Status: DISCONTINUED | OUTPATIENT
Start: 2021-07-16 | End: 2021-07-16 | Stop reason: HOSPADM

## 2021-07-16 RX ORDER — HYDRALAZINE HYDROCHLORIDE 20 MG/ML
5 INJECTION INTRAMUSCULAR; INTRAVENOUS
Status: DISCONTINUED | OUTPATIENT
Start: 2021-07-16 | End: 2021-07-16 | Stop reason: HOSPADM

## 2021-07-16 RX ORDER — GABAPENTIN 300 MG/1
300 CAPSULE ORAL ONCE
Status: COMPLETED | OUTPATIENT
Start: 2021-07-16 | End: 2021-07-16

## 2021-07-16 RX ORDER — ONDANSETRON 2 MG/ML
4 INJECTION INTRAMUSCULAR; INTRAVENOUS ONCE AS NEEDED
Status: DISCONTINUED | OUTPATIENT
Start: 2021-07-16 | End: 2021-07-16 | Stop reason: HOSPADM

## 2021-07-16 RX ORDER — OXYCODONE AND ACETAMINOPHEN 10; 325 MG/1; MG/1
1 TABLET ORAL EVERY 4 HOURS PRN
Status: DISCONTINUED | OUTPATIENT
Start: 2021-07-16 | End: 2021-07-16 | Stop reason: HOSPADM

## 2021-07-16 RX ORDER — SODIUM CHLORIDE 0.9 % (FLUSH) 0.9 %
10 SYRINGE (ML) INJECTION EVERY 12 HOURS SCHEDULED
Status: DISCONTINUED | OUTPATIENT
Start: 2021-07-16 | End: 2021-07-16 | Stop reason: HOSPADM

## 2021-07-16 RX ORDER — HYDROMORPHONE HYDROCHLORIDE 1 MG/ML
0.5 INJECTION, SOLUTION INTRAMUSCULAR; INTRAVENOUS; SUBCUTANEOUS
Status: DISCONTINUED | OUTPATIENT
Start: 2021-07-16 | End: 2021-07-16 | Stop reason: HOSPADM

## 2021-07-16 RX ORDER — ACETAMINOPHEN 325 MG/1
325 TABLET ORAL EVERY 4 HOURS PRN
Qty: 30 TABLET | Refills: 1 | Status: SHIPPED | OUTPATIENT
Start: 2021-07-16 | End: 2021-10-08 | Stop reason: ALTCHOICE

## 2021-07-16 RX ORDER — FENTANYL CITRATE 50 UG/ML
INJECTION, SOLUTION INTRAMUSCULAR; INTRAVENOUS AS NEEDED
Status: DISCONTINUED | OUTPATIENT
Start: 2021-07-16 | End: 2021-07-16 | Stop reason: SURG

## 2021-07-16 RX ORDER — ONDANSETRON 2 MG/ML
INJECTION INTRAMUSCULAR; INTRAVENOUS AS NEEDED
Status: DISCONTINUED | OUTPATIENT
Start: 2021-07-16 | End: 2021-07-16 | Stop reason: SURG

## 2021-07-16 RX ORDER — PROPOFOL 10 MG/ML
VIAL (ML) INTRAVENOUS AS NEEDED
Status: DISCONTINUED | OUTPATIENT
Start: 2021-07-16 | End: 2021-07-16 | Stop reason: SURG

## 2021-07-16 RX ORDER — NEOSTIGMINE METHYLSULFATE 0.5 MG/ML
INJECTION, SOLUTION INTRAVENOUS AS NEEDED
Status: DISCONTINUED | OUTPATIENT
Start: 2021-07-16 | End: 2021-07-16 | Stop reason: SURG

## 2021-07-16 RX ORDER — IBUPROFEN 600 MG/1
600 TABLET ORAL ONCE AS NEEDED
Status: DISCONTINUED | OUTPATIENT
Start: 2021-07-16 | End: 2021-07-16 | Stop reason: HOSPADM

## 2021-07-16 RX ORDER — MIDAZOLAM HYDROCHLORIDE 1 MG/ML
1 INJECTION INTRAMUSCULAR; INTRAVENOUS
Status: DISCONTINUED | OUTPATIENT
Start: 2021-07-16 | End: 2021-07-16 | Stop reason: HOSPADM

## 2021-07-16 RX ORDER — SODIUM CHLORIDE, SODIUM LACTATE, POTASSIUM CHLORIDE, CALCIUM CHLORIDE 600; 310; 30; 20 MG/100ML; MG/100ML; MG/100ML; MG/100ML
125 INJECTION, SOLUTION INTRAVENOUS CONTINUOUS
Status: DISCONTINUED | OUTPATIENT
Start: 2021-07-16 | End: 2021-07-16 | Stop reason: HOSPADM

## 2021-07-16 RX ADMIN — OXYCODONE 10 MG: 5 TABLET ORAL at 10:49

## 2021-07-16 RX ADMIN — ACETAMINOPHEN 1000 MG: 500 TABLET, FILM COATED ORAL at 10:49

## 2021-07-16 RX ADMIN — PROPOFOL 200 MG: 10 INJECTION, EMULSION INTRAVENOUS at 12:14

## 2021-07-16 RX ADMIN — FAMOTIDINE 20 MG: 10 INJECTION INTRAVENOUS at 11:02

## 2021-07-16 RX ADMIN — ONDANSETRON HYDROCHLORIDE 8 MG: 8 TABLET, FILM COATED ORAL at 10:49

## 2021-07-16 RX ADMIN — FENTANYL CITRATE 50 MCG: 50 INJECTION INTRAMUSCULAR; INTRAVENOUS at 15:08

## 2021-07-16 RX ADMIN — LIDOCAINE HYDROCHLORIDE 100 MG: 20 INJECTION, SOLUTION INFILTRATION; PERINEURAL at 12:14

## 2021-07-16 RX ADMIN — DEXAMETHASONE SODIUM PHOSPHATE 8 MG: 10 INJECTION INTRAMUSCULAR; INTRAVENOUS at 12:21

## 2021-07-16 RX ADMIN — GLYCOPYRROLATE 0.2 MG: 0.2 INJECTION INTRAMUSCULAR; INTRAVENOUS at 15:07

## 2021-07-16 RX ADMIN — ONDANSETRON 4 MG: 2 INJECTION INTRAMUSCULAR; INTRAVENOUS at 15:07

## 2021-07-16 RX ADMIN — NEOSTIGMINE METHYLSULFATE 1.5 MG: 0.5 INJECTION INTRAVENOUS at 15:07

## 2021-07-16 RX ADMIN — SODIUM CHLORIDE, POTASSIUM CHLORIDE, SODIUM LACTATE AND CALCIUM CHLORIDE 9 ML/HR: 600; 310; 30; 20 INJECTION, SOLUTION INTRAVENOUS at 11:02

## 2021-07-16 RX ADMIN — ROCURONIUM BROMIDE 40 MG: 50 INJECTION INTRAVENOUS at 12:14

## 2021-07-16 RX ADMIN — DOXYCYCLINE 200 MG: 100 INJECTION, POWDER, LYOPHILIZED, FOR SOLUTION INTRAVENOUS at 11:02

## 2021-07-16 RX ADMIN — SODIUM CHLORIDE, POTASSIUM CHLORIDE, SODIUM LACTATE AND CALCIUM CHLORIDE: 600; 310; 30; 20 INJECTION, SOLUTION INTRAVENOUS at 13:39

## 2021-07-16 RX ADMIN — ROCURONIUM BROMIDE 10 MG: 50 INJECTION INTRAVENOUS at 13:53

## 2021-07-16 RX ADMIN — CELECOXIB 400 MG: 200 CAPSULE ORAL at 10:49

## 2021-07-16 RX ADMIN — GABAPENTIN 300 MG: 300 CAPSULE ORAL at 10:49

## 2021-07-16 RX ADMIN — FENTANYL CITRATE 50 MCG: 50 INJECTION INTRAMUSCULAR; INTRAVENOUS at 12:11

## 2021-07-16 RX ADMIN — HYDROMORPHONE HYDROCHLORIDE 0.5 MG: 2 INJECTION, SOLUTION INTRAMUSCULAR; INTRAVENOUS; SUBCUTANEOUS at 15:09

## 2021-07-16 NOTE — ANESTHESIA PREPROCEDURE EVALUATION
Anesthesia Evaluation     Patient summary reviewed                Airway   Mallampati: II  Neck ROM: full  No difficulty expected  Dental      Pulmonary    Cardiovascular     Rhythm: regular    (+) hyperlipidemia,       Neuro/Psych  GI/Hepatic/Renal/Endo      Musculoskeletal     Abdominal    Substance History      OB/GYN          Other      history of cancer remission                    Anesthesia Plan    ASA 2     general       Anesthetic plan, all risks, benefits, and alternatives have been provided, discussed and informed consent has been obtained with: patient.  Use of blood products discussed with patient .

## 2021-07-16 NOTE — ANESTHESIA POSTPROCEDURE EVALUATION
Patient: Ernestina Urrutia    Procedure Summary     Date: 07/16/21 Room / Location: [facility] OR 06 / [facility] MAIN OR    Anesthesia Start: 1209 Anesthesia Stop: 1534    Procedures:       LEFT REMOVAL TISSUE EXPANDER AND PLACEMENT OF IMPLANT (Left Breast)      FAT GRAFTING (Left )      RIGHT MASTOPEXY FOR SYMMETRY (Right Breast) Diagnosis:     Surgeons: Clifford Casper MD Provider: Karthik Oconnor MD    Anesthesia Type: general ASA Status: 2          Anesthesia Type: general    Vitals  Vitals Value Taken Time   /89 07/16/21 1600   Temp 36.7 °C (98.1 °F) 07/16/21 1530   Pulse 67 07/16/21 1611   Resp 14 07/16/21 1600   SpO2 94 % 07/16/21 1611   Vitals shown include unvalidated device data.        Post Anesthesia Care and Evaluation    Patient location during evaluation: PACU  Patient participation: complete - patient participated  Level of consciousness: awake  Pain score: 1  Pain management: adequate  Airway patency: patent  Anesthetic complications: No anesthetic complications  PONV Status: none  Cardiovascular status: acceptable  Respiratory status: acceptable  Hydration status: acceptable

## 2021-07-16 NOTE — OP NOTE
Pre-Operative Diagnosis: left breast cancer, breast asymmetry     Post-Operative Diagnosis: Same     Procedure Performed:   1. Removal left breast expander and placement of permanent implant (allergan [id number])  2. Left breast autologous fat grafting 150cc  3. Right breast mastopexy for symmetry     Surgeon: BRIAN Casper MD     Assistant: None     Anesthesia: General     Estimated Blood Loss: 20cc     Specimens: right breast tissue      Complications: None immediate     Indications: She has completed expansion and is ready for second stage reconstruction.  Discussed placement of smooth round silicone gel implant with fat grafting for superior pole defect and right mastopexy for symmetry.  Discussed risks of the procedure and she reviewed and signed the consent form.     We discussed risks, benefits and alternatives including but not limited to: bleeding, infection, asymmetry, poor or slow wound healing, need for further surgery, possible recurrence.  The patient elected to proceed.     Description of Procedure: The patient was met in the preoperative holding area.  All questions were answered and informed consent was assured.       Marked in a standing position and areas of fat grafting were marked on the left side and wise pattern mastopexy designed on the right.  Transferred to the OR and placed supine with arms well padded.     After induction of appropriate anesthesia, a timeout was performed correctly identifying the patient, operative site, and procedure to be performed.  All present were in agreement.     Local anesthetic infiltrated and the chest and abdomen prepped and draped.  Attention first turned to the left breast and the lateral IMF incision made and the pocket was dissected and exposed.  The capsule was incised and expander evacuated and removed.  The alloderm was well incorporated and there was no free fluid.  The sizer was placed.     Tumescent infiltrated in the abdomen and suction lipectomy  performed with the revolve device with 3mm cannula.  The fat was rinsed three times with warmed LR.  It was infiltrated in the breast in small aliquots with blunt cannula.       Right breast then addressed and a inferior central pedicle formed and deepithelialized.  Lateral flaps developed and the inferior lateral tissue debulked.  The breast was tailor tacked and she was placed sitting and symmetry was adequate.  The right breast was irrigated and made hemostatic.  Closure performed with 2-0 pds in the vertical pillars, 3-0 monocryl deep dermal and 4-0 monocryl intracuticular suture.       The sizer was removed and the pocket irrigated with antibiotic and dilute betadine solution.  It was rinsed clear and skin reprepped.  Gloves were changed and the implant brought onto the field and rinsed.  It was placed in the pocket with a dwyer funnel and no touch technique.  Closure performed with 2-0 vicryl in the capsule in a running fashion and reinforced with interrupted subcutaneous suture.  Skin closed with 3-0 monocryl deep dermal and 4-0 monocryl intracuticular.  Skin dressed with glue and tegaderm.  She was placed in a well padded ace wrap and abdominal binder.     The patient was then aroused from anesthesia with ease and transferred to the postoperative care area in good condition. All sponge, needle, and instrument counts were correct.

## 2021-07-19 LAB
CYTO UR: NORMAL
LAB AP CASE REPORT: NORMAL
PATH REPORT.FINAL DX SPEC: NORMAL
PATH REPORT.GROSS SPEC: NORMAL

## 2021-07-28 RX ORDER — ANASTROZOLE 1 MG/1
TABLET ORAL
Qty: 90 TABLET | Refills: 0 | Status: SHIPPED | OUTPATIENT
Start: 2021-07-28 | End: 2021-10-22

## 2021-10-08 ENCOUNTER — OFFICE VISIT (OUTPATIENT)
Dept: FAMILY MEDICINE CLINIC | Facility: CLINIC | Age: 46
End: 2021-10-08

## 2021-10-08 VITALS
HEART RATE: 64 BPM | WEIGHT: 203.6 LBS | BODY MASS INDEX: 33.92 KG/M2 | OXYGEN SATURATION: 98 % | TEMPERATURE: 97.8 F | HEIGHT: 65 IN | SYSTOLIC BLOOD PRESSURE: 118 MMHG | DIASTOLIC BLOOD PRESSURE: 72 MMHG

## 2021-10-08 DIAGNOSIS — N60.92 ATYPICAL DUCTAL HYPERPLASIA OF LEFT BREAST: ICD-10-CM

## 2021-10-08 DIAGNOSIS — K59.00 CONSTIPATION, UNSPECIFIED CONSTIPATION TYPE: Primary | ICD-10-CM

## 2021-10-08 DIAGNOSIS — L71.9 ROSACEA: ICD-10-CM

## 2021-10-08 DIAGNOSIS — Z13.6 SCREENING FOR CARDIOVASCULAR CONDITION: ICD-10-CM

## 2021-10-08 DIAGNOSIS — Z23 IMMUNIZATION DUE: ICD-10-CM

## 2021-10-08 DIAGNOSIS — E55.9 VITAMIN D DEFICIENCY: ICD-10-CM

## 2021-10-08 PROCEDURE — 99214 OFFICE O/P EST MOD 30 MIN: CPT | Performed by: FAMILY MEDICINE

## 2021-10-08 RX ORDER — AMOXICILLIN 250 MG
2 CAPSULE ORAL DAILY PRN
Qty: 30 TABLET | Refills: 1 | Status: SHIPPED | OUTPATIENT
Start: 2021-10-08 | End: 2022-10-08

## 2021-10-08 NOTE — PROGRESS NOTES
Subjective   Ernestina Urrutia is a 46 y.o. female.     Chief Complaint   Patient presents with   • Follow-up     check cholesterol and vitamin d        History of Present Illness   Vit d def- has taken replacement. Due labs    Rosacea- steroid cream otc doping well.     Wanting a lipid screen    H/o breast cancer- has had surgery and following with oncology. Is on arimidex for 5 years now. Has been to plastic surgeon and gotten good results.     Constipation- well controlled on meds prn.     The following portions of the patient's history were reviewed and updated as appropriate: allergies, current medications, past family history, past medical history, past social history, past surgical history and problem list.    Past Medical History:   Diagnosis Date   • Acid reflux    • Allergic 09/2000    Seasonal allergies   • Breast cyst    • Cholelithiasis 11/2010    Gallbladet removed in November 2010   • DCIS (ductal carcinoma in situ)     LEFT BREAST   • Elevated cholesterol    • Fibrocystic breast    • History of hematuria     MICROSCOPIC   • History of kidney stones    • History of migraine    • Kidney stone 03/2020    [facility]   • Knee pain, left    • Obesity 05/2013    I have been a little overweight since 2013   • Rosacea    • Seasonal allergies        Past Surgical History:   Procedure Laterality Date   • BILATERAL OOPHORECTOMY     • BREAST BIOPSY Left 01/05/2021   • BREAST RECONSTRUCTION Left 3/25/2021    Procedure: LEFT PLACEMENT TISSUE EXPANDER AND ALLODERM;  Surgeon: Clifford Casper MD;  Location: [facility];  Service: Plastics;  Laterality: Left;   • BREAST TISSUE EXPANDER REMOVAL INSERTION OF IMPLANT Left 7/16/2021    Procedure: LEFT REMOVAL TISSUE EXPANDER AND PLACEMENT OF IMPLANT;  Surgeon: Clifford Casper MD;  Location: [facility];  Service: Plastics;  Laterality: Left;   • CHOLECYSTECTOMY     • CYSTOSCOPY     • FAT GRAFTING Left 7/16/2021    Procedure:  "FAT GRAFTING;  Surgeon: Clifford Casper MD;  Location: [facility];  Service: Plastics;  Laterality: Left;   • HYSTERECTOMY     • MASTECTOMY W/ SENTINEL NODE BIOPSY Left 3/25/2021    Procedure: LEFT mastectomy, LEFT axilla sentinel node biopsy,  with or without reconstruction as her surgical treatment.;  Surgeon: Zenia Washington MD;  Location: [facility] OR;  Service: General;  Laterality: Left;   • MASTOPEXY Right 7/16/2021    Procedure: RIGHT MASTOPEXY FOR SYMMETRY;  Surgeon: Clifford Casper MD;  Location: [facility] OR;  Service: Plastics;  Laterality: Right;   • MRI BREAST BIOPSY UNILATERAL Left 02/10/2021   • OOPHORECTOMY     • SUBTOTAL HYSTERECTOMY  2014, 2015, 2018    Partial in 3 surgeries, but still have cervix       Family History   Problem Relation Age of Onset   • Heart disease Mother         Triple CABG 03/07/2017   • Hypertension Mother    • Hyperlipidemia Mother    • Hyperlipidemia Father    • Hypertension Father    • Malig Hyperthermia Neg Hx        Social History     Socioeconomic History   • Marital status:      Spouse name: Not on file   • Number of children: Not on file   • Years of education: Not on file   • Highest education level: Not on file   Tobacco Use   • Smoking status: Never Smoker   • Smokeless tobacco: Never Used   Vaping Use   • Vaping Use: Never used   Substance and Sexual Activity   • Alcohol use: Never   • Drug use: Never   • Sexual activity: Not Currently     Partners: Male     Birth control/protection: Abstinence, Surgical       Review of Systems   Constitutional: Negative for fever.   Respiratory: Negative for shortness of breath.    Cardiovascular: Negative for chest pain.       Objective   Visit Vitals  /72 (BP Location: Right arm, Patient Position: Sitting)   Pulse 64   Temp 97.8 °F (36.6 °C)   Ht 165.1 cm (65\")   Wt 92.4 kg (203 lb 9.6 oz)   LMP  (LMP Unknown)   SpO2 98%   BMI 33.88 kg/m²     Body mass index is 33.88 kg/m².  Physical " Exam  Constitutional:       Appearance: Normal appearance. She is well-developed.   Cardiovascular:      Rate and Rhythm: Normal rate and regular rhythm.      Heart sounds: Normal heart sounds.   Pulmonary:      Effort: Pulmonary effort is normal.      Breath sounds: Normal breath sounds.   Musculoskeletal:         General: No swelling. Normal range of motion.   Skin:     General: Skin is warm and dry.      Findings: No rash.   Neurological:      General: No focal deficit present.      Mental Status: She is alert and oriented to person, place, and time.   Psychiatric:         Mood and Affect: Mood normal.         Behavior: Behavior normal.           Assessment/Plan   Diagnoses and all orders for this visit:    1. Constipation, unspecified constipation type (Primary)  -     sennosides-docusate (PERICOLACE) 8.6-50 MG per tablet; Take 2 tablets by mouth Daily As Needed for Constipation.  Dispense: 30 tablet; Refill: 1    2. Vitamin D deficiency  -     Comprehensive Metabolic Panel  -     Vitamin D 25 Hydroxy    3. Atypical ductal hyperplasia of left breast    4. Rosacea    5. Screening for cardiovascular condition  -     Comprehensive Metabolic Panel  -     Lipid Panel    6. Immunization due    Other orders  -     Cancel: FluLaval/Fluarix (VFC) >6 Months        Declines flu shot. Cont meds prn. F/U in 6-12 months.

## 2021-10-09 LAB
25(OH)D3+25(OH)D2 SERPL-MCNC: 27.2 NG/ML (ref 30–100)
ALBUMIN SERPL-MCNC: 4.7 G/DL (ref 3.5–5.2)
ALBUMIN/GLOB SERPL: 1.9 G/DL
ALP SERPL-CCNC: 110 U/L (ref 39–117)
ALT SERPL-CCNC: 18 U/L (ref 1–33)
AST SERPL-CCNC: 14 U/L (ref 1–32)
BILIRUB SERPL-MCNC: 0.7 MG/DL (ref 0–1.2)
BUN SERPL-MCNC: 8 MG/DL (ref 6–20)
BUN/CREAT SERPL: 11.1 (ref 7–25)
CALCIUM SERPL-MCNC: 9.9 MG/DL (ref 8.6–10.5)
CHLORIDE SERPL-SCNC: 106 MMOL/L (ref 98–107)
CHOLEST SERPL-MCNC: 216 MG/DL (ref 0–200)
CO2 SERPL-SCNC: 27.4 MMOL/L (ref 22–29)
CREAT SERPL-MCNC: 0.72 MG/DL (ref 0.57–1)
GLOBULIN SER CALC-MCNC: 2.5 GM/DL
GLUCOSE SERPL-MCNC: 90 MG/DL (ref 65–99)
HDLC SERPL-MCNC: 45 MG/DL (ref 40–60)
LDLC SERPL CALC-MCNC: 141 MG/DL (ref 0–100)
POTASSIUM SERPL-SCNC: 4.4 MMOL/L (ref 3.5–5.2)
PROT SERPL-MCNC: 7.2 G/DL (ref 6–8.5)
SODIUM SERPL-SCNC: 144 MMOL/L (ref 136–145)
TRIGL SERPL-MCNC: 166 MG/DL (ref 0–150)
VLDLC SERPL CALC-MCNC: 30 MG/DL (ref 5–40)

## 2021-10-12 ENCOUNTER — HOSPITAL ENCOUNTER (OUTPATIENT)
Dept: BONE DENSITY | Facility: HOSPITAL | Age: 46
Discharge: HOME | End: 2021-10-12
Admitting: INTERNAL MEDICINE

## 2021-10-12 DIAGNOSIS — Z78.0 POSTMENOPAUSAL: ICD-10-CM

## 2021-10-12 DIAGNOSIS — N60.92 ATYPICAL DUCTAL HYPERPLASIA OF LEFT BREAST: ICD-10-CM

## 2021-10-12 PROCEDURE — 77080 DXA BONE DENSITY AXIAL: CPT

## 2021-10-16 ENCOUNTER — IMMUNIZATION (OUTPATIENT)
Dept: VACCINE CLINIC | Facility: HOSPITAL | Age: 46
End: 2021-10-16

## 2021-10-16 PROCEDURE — 0004A ADM SARSCOV2 30MCG/0.3ML BOOSTER: CPT | Performed by: INTERNAL MEDICINE

## 2021-10-16 PROCEDURE — 91300 HC SARSCOV02 VAC 30MCG/0.3ML IM: CPT | Performed by: INTERNAL MEDICINE

## 2021-10-19 ENCOUNTER — OFFICE VISIT (OUTPATIENT)
Dept: ONCOLOGY | Facility: CLINIC | Age: 46
End: 2021-10-19

## 2021-10-19 ENCOUNTER — LAB (OUTPATIENT)
Dept: LAB | Facility: HOSPITAL | Age: 46
End: 2021-10-19

## 2021-10-19 VITALS
SYSTOLIC BLOOD PRESSURE: 122 MMHG | OXYGEN SATURATION: 99 % | BODY MASS INDEX: 33.66 KG/M2 | DIASTOLIC BLOOD PRESSURE: 79 MMHG | RESPIRATION RATE: 14 BRPM | HEART RATE: 59 BPM | WEIGHT: 202 LBS | HEIGHT: 65 IN | TEMPERATURE: 98 F

## 2021-10-19 DIAGNOSIS — N60.92 ATYPICAL DUCTAL HYPERPLASIA OF LEFT BREAST: Primary | ICD-10-CM

## 2021-10-19 DIAGNOSIS — N60.92 ATYPICAL DUCTAL HYPERPLASIA OF LEFT BREAST: ICD-10-CM

## 2021-10-19 LAB
ALBUMIN SERPL-MCNC: 4.7 G/DL (ref 3.5–5.2)
ALBUMIN/GLOB SERPL: 1.6 G/DL (ref 1.1–2.4)
ALP SERPL-CCNC: 117 U/L (ref 38–116)
ALT SERPL W P-5'-P-CCNC: 17 U/L (ref 0–33)
ANION GAP SERPL CALCULATED.3IONS-SCNC: 10.8 MMOL/L (ref 5–15)
AST SERPL-CCNC: 15 U/L (ref 0–32)
BASOPHILS # BLD AUTO: 0.04 10*3/MM3 (ref 0–0.2)
BASOPHILS NFR BLD AUTO: 0.8 % (ref 0–1.5)
BILIRUB SERPL-MCNC: 0.5 MG/DL (ref 0.2–1.2)
BUN SERPL-MCNC: 12 MG/DL (ref 6–20)
BUN/CREAT SERPL: 16.7 (ref 7.3–30)
CALCIUM SPEC-SCNC: 9.9 MG/DL (ref 8.5–10.2)
CHLORIDE SERPL-SCNC: 105 MMOL/L (ref 98–107)
CO2 SERPL-SCNC: 26.2 MMOL/L (ref 22–29)
CREAT SERPL-MCNC: 0.72 MG/DL (ref 0.6–1.1)
DEPRECATED RDW RBC AUTO: 46 FL (ref 37–54)
EOSINOPHIL # BLD AUTO: 0.33 10*3/MM3 (ref 0–0.4)
EOSINOPHIL NFR BLD AUTO: 6.6 % (ref 0.3–6.2)
ERYTHROCYTE [DISTWIDTH] IN BLOOD BY AUTOMATED COUNT: 13.4 % (ref 12.3–15.4)
GFR SERPL CREATININE-BSD FRML MDRD: 87 ML/MIN/1.73
GLOBULIN UR ELPH-MCNC: 2.9 GM/DL (ref 1.8–3.5)
GLUCOSE SERPL-MCNC: 89 MG/DL (ref 74–124)
HCT VFR BLD AUTO: 42.3 % (ref 34–46.6)
HGB BLD-MCNC: 13.6 G/DL (ref 12–15.9)
IMM GRANULOCYTES # BLD AUTO: 0.02 10*3/MM3 (ref 0–0.05)
IMM GRANULOCYTES NFR BLD AUTO: 0.4 % (ref 0–0.5)
LYMPHOCYTES # BLD AUTO: 1.93 10*3/MM3 (ref 0.7–3.1)
LYMPHOCYTES NFR BLD AUTO: 38.8 % (ref 19.6–45.3)
MCH RBC QN AUTO: 29.9 PG (ref 26.6–33)
MCHC RBC AUTO-ENTMCNC: 32.2 G/DL (ref 31.5–35.7)
MCV RBC AUTO: 93 FL (ref 79–97)
MONOCYTES # BLD AUTO: 0.57 10*3/MM3 (ref 0.1–0.9)
MONOCYTES NFR BLD AUTO: 11.5 % (ref 5–12)
NEUTROPHILS NFR BLD AUTO: 2.08 10*3/MM3 (ref 1.7–7)
NEUTROPHILS NFR BLD AUTO: 41.9 % (ref 42.7–76)
NRBC BLD AUTO-RTO: 0 /100 WBC (ref 0–0.2)
PLATELET # BLD AUTO: 319 10*3/MM3 (ref 140–450)
PMV BLD AUTO: 10.4 FL (ref 6–12)
POTASSIUM SERPL-SCNC: 4.2 MMOL/L (ref 3.5–4.7)
PROT SERPL-MCNC: 7.6 G/DL (ref 6.3–8)
RBC # BLD AUTO: 4.55 10*6/MM3 (ref 3.77–5.28)
SODIUM SERPL-SCNC: 142 MMOL/L (ref 134–145)
WBC # BLD AUTO: 4.97 10*3/MM3 (ref 3.4–10.8)

## 2021-10-19 PROCEDURE — 80053 COMPREHEN METABOLIC PANEL: CPT

## 2021-10-19 PROCEDURE — 99214 OFFICE O/P EST MOD 30 MIN: CPT | Performed by: INTERNAL MEDICINE

## 2021-10-19 PROCEDURE — 85025 COMPLETE CBC W/AUTO DIFF WBC: CPT

## 2021-10-19 PROCEDURE — 36415 COLL VENOUS BLD VENIPUNCTURE: CPT

## 2021-10-19 NOTE — PROGRESS NOTES
Subjective   Ernestina Urrutia is a 46 y.o. female.  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  Prior to noticing the nipple discharge patient had a negative screening mammogram in November 2020.      12/15/2020-left diagnostic mammogram and ultrasound was performed for further evaluation of nipple discharge  On the mammogram the breasts were noted to be heterogeneously dense.  Benign-appearing calcifications were noted.  Multiple masses and/or ducts in the retroareolar left breast were noted.    Ultrasound-at 6 o'clock position 1 cm from the nipple there was a 0.5 x 0.4 x 0.5 cm complicated cyst versus solid mass contiguous with the duct was noted.  There were numerous benign-appearing cysts and ducts.  MRI was recommended for further evaluation.  Also ultrasound-guided biopsy of the 6 o'clock position mass was recommended.    1/5/2021-ultrasound-guided biopsy-pathology consistent with atypical ductal hyperplasia bordering on low-grade ductal carcinoma in situ.  Consultation was obtained from Dr. Cristopher Rasmussen and pathology was consistent with low-grade ductal carcinoma in situ, micropapillary type, atypical lobular hyperplasia, sclerosing adenosis and intraductal papilloma was noted.    1/25/2021-bilateral breast MRI-biopsy clip noted at 6 o'clock position of the left breast.  No suspicious masses or enhancement noted at this location.  Suspicious 1.2 cm focal area of clumped non-mass enhancement noted at 9 o'clock position.  MRI guided core needle biopsy recommended.  In the right breast there is a 2.1 x 1.4 x 1.3 cm cyst.    1/25/2021-right breast diagnostic mammogram and ultrasound-heterogeneously dense breast.  No suspicious masses or calcifications.  Ultrasound-at 6 o'clock position retroareolar there is a 2 x 1.2 x 1.7 cm  benign-appearing complicated cyst with mobile internal debris.  Adjacent smaller cysts and ectatic ducts noted.    2/9/2021-MRI guided biopsy of the 9 o'clock position mass-pathology consistent with preoperative breast parenchyma with sclerotic intraductal papillomas.  No atypical ductal hyperplasia or lobular hyperplasia or in situ or invasive carcinoma.    Patient underwent a left breast mastectomy and sentinel lymph node biopsy on 3/25/2021.  Pathology did not show any residual ductal carcinoma in situ or invasive carcinoma.  Stage pTis N0 M0, staging based on the presence of DCIS on the core biopsy.  DCIS of the core biopsy measured 3 mm.  DCIS was low-grade, ER +%, VT +51-60%.Multiple intraductal papillomas were noted.    She has been referred to discuss adjuvant endocrine therapy/risk reduction    She had been on hormone replacement therapy for about 2 years since 2018 to end of 2020.  She underwent a total hysterectomy in 2018.  This was stopped after diagnosis of DCIS.  She reports occasional hot flashes but no other menopausal symptoms at this time.  She does not have any family history of breast cancer.    Interval history  Ms. Urrutia presents to the clinic today for follow-up.  She reports being compliant with anastrozole.  In the beginning she experienced hot flashes, arthralgias, insomnia.  Subsequently this improved.  She had a COVID-19 booster dose about a week ago in the right upper extremity.  Since then she has noticed pain in her right axilla and the right breast.  She is somewhat concerned about this pain.  She is scheduled for screening mammogram of the right breast on 11/4/2021.  Denies any palpable chest wall abnormalities on the left side.  No new right breast masses.  She had a DEXA scan which showed normal bone mineral density.  She had a lipid panel performed by her primary care physician and has had an elevated LDL.  She is not on any medication at this time.    The following  portions of the patient's history were reviewed and updated as appropriate: allergies, current medications, past family history, past medical history, past social history, past surgical history and problem list.    Past Medical History:   Diagnosis Date   • Acid reflux    • Allergic 09/2000    Seasonal allergies   • Breast cyst    • Cholelithiasis 11/2010    Gallbladet removed in November 2010   • DCIS (ductal carcinoma in situ)     LEFT BREAST   • Elevated cholesterol    • Fibrocystic breast    • History of hematuria     MICROSCOPIC   • History of kidney stones    • History of migraine    • Kidney stone 03/2020    [facility]   • Knee pain, left    • Obesity 05/2013    I have been a little overweight since 2013   • Rosacea    • Seasonal allergies         Past Surgical History:   Procedure Laterality Date   • BILATERAL OOPHORECTOMY     • BREAST BIOPSY Left 01/05/2021   • BREAST RECONSTRUCTION Left 3/25/2021    Procedure: LEFT PLACEMENT TISSUE EXPANDER AND ALLODERM;  Surgeon: Clifford Casper MD;  Location: [facility];  Service: Plastics;  Laterality: Left;   • BREAST TISSUE EXPANDER REMOVAL INSERTION OF IMPLANT Left 7/16/2021    Procedure: LEFT REMOVAL TISSUE EXPANDER AND PLACEMENT OF IMPLANT;  Surgeon: Clifford Casper MD;  Location: [facility];  Service: Plastics;  Laterality: Left;   • CHOLECYSTECTOMY     • CYSTOSCOPY     • FAT GRAFTING Left 7/16/2021    Procedure: FAT GRAFTING;  Surgeon: Clifford Casper MD;  Location: [facility];  Service: Plastics;  Laterality: Left;   • HYSTERECTOMY     • MASTECTOMY W/ SENTINEL NODE BIOPSY Left 3/25/2021    Procedure: LEFT mastectomy, LEFT axilla sentinel node biopsy,  with or without reconstruction as her surgical treatment.;  Surgeon: Zenia Washington MD;  Location: [facility];  Service: General;  Laterality: Left;   • MASTOPEXY Right 7/16/2021    Procedure: RIGHT MASTOPEXY FOR SYMMETRY;  Surgeon: Clifford Casper  "MD Evan;  Location: [facility] OR;  Service: Plastics;  Laterality: Right;   • MRI BREAST BIOPSY UNILATERAL Left 02/10/2021   • OOPHORECTOMY     • SUBTOTAL HYSTERECTOMY  , , 2018    Partial in 3 surgeries, but still have cervix        Family History   Problem Relation Age of Onset   • Heart disease Mother         Triple CABG 2017   • Hypertension Mother    • Hyperlipidemia Mother    • Hyperlipidemia Father    • Hypertension Father    • Malig Hyperthermia Neg Hx         Social History     Socioeconomic History   • Marital status:    Tobacco Use   • Smoking status: Never Smoker   • Smokeless tobacco: Never Used   Vaping Use   • Vaping Use: Never used   Substance and Sexual Activity   • Alcohol use: Never   • Drug use: Never   • Sexual activity: Not Currently     Partners: Male     Birth control/protection: Abstinence, Surgical        OB History             Para        Term   0            AB        Living           SAB        IAB        Ectopic        Molar        Multiple        Live Births                   Age at menarche 12  Age at menopause-43  Patient does not have any children  She used birth control pills for 2 years  Hormone replacement therapy from 2018 to 2020    No Known Allergies       Review of systems as mentioned in the HPI      Objective   Blood pressure 122/79, pulse 59, temperature 98 °F (36.7 °C), temperature source Temporal, resp. rate 14, height 165 cm (64.96\"), weight 91.6 kg (202 lb), SpO2 99 %, not currently breastfeeding.   Physical Exam  Vitals reviewed.   Constitutional:       General: She is not in acute distress.     Appearance: Normal appearance. She is not ill-appearing, toxic-appearing or diaphoretic.   HENT:      Head: Normocephalic and atraumatic.      Right Ear: External ear normal.      Left Ear: External ear normal.      Nose: Nose normal.      Mouth/Throat:      Mouth: Mucous membranes are moist.      Pharynx: Oropharynx is " clear.   Eyes:      Extraocular Movements: Extraocular movements intact.      Conjunctiva/sclera: Conjunctivae normal.      Pupils: Pupils are equal, round, and reactive to light.   Cardiovascular:      Rate and Rhythm: Normal rate.   Pulmonary:      Effort: Pulmonary effort is normal.      Breath sounds: Normal breath sounds.   Abdominal:      General: Abdomen is flat. Bowel sounds are normal.      Palpations: Abdomen is soft.   Musculoskeletal:         General: Normal range of motion.      Cervical back: Normal range of motion.   Skin:     General: Skin is warm.   Neurological:      General: No focal deficit present.      Mental Status: She is alert.   Psychiatric:         Mood and Affect: Mood normal.         Behavior: Behavior normal.         Thought Content: Thought content normal.         Judgment: Judgment normal.       Breast Exam: Right breast appears normal on inspection.  No palpable abnormalities of the right breast.  There is some tenderness in the upper outer quadrant of the right breast as well as right axilla.  No palpable right axillary lymphadenopathy.  Left breast is status post mastectomy and reconstruction.    I have reexamined the patient and the results are consistent with the previously documented exam. Maritza Mariano MD     Lab on 10/19/2021   Component Date Value Ref Range Status   • WBC 10/19/2021 4.97  3.40 - 10.80 10*3/mm3 Final   • RBC 10/19/2021 4.55  3.77 - 5.28 10*6/mm3 Final   • Hemoglobin 10/19/2021 13.6  12.0 - 15.9 g/dL Final   • Hematocrit 10/19/2021 42.3  34.0 - 46.6 % Final   • MCV 10/19/2021 93.0  79.0 - 97.0 fL Final   • MCH 10/19/2021 29.9  26.6 - 33.0 pg Final   • MCHC 10/19/2021 32.2  31.5 - 35.7 g/dL Final   • RDW 10/19/2021 13.4  12.3 - 15.4 % Final   • RDW-SD 10/19/2021 46.0  37.0 - 54.0 fl Final   • MPV 10/19/2021 10.4  6.0 - 12.0 fL Final   • Platelets 10/19/2021 319  140 - 450 10*3/mm3 Final   • Neutrophil % 10/19/2021 41.9* 42.7 - 76.0 % Final   • Lymphocyte %  10/19/2021 38.8  19.6 - 45.3 % Final   • Monocyte % 10/19/2021 11.5  5.0 - 12.0 % Final   • Eosinophil % 10/19/2021 6.6* 0.3 - 6.2 % Final   • Basophil % 10/19/2021 0.8  0.0 - 1.5 % Final   • Immature Grans % 10/19/2021 0.4  0.0 - 0.5 % Final   • Neutrophils, Absolute 10/19/2021 2.08  1.70 - 7.00 10*3/mm3 Final   • Lymphocytes, Absolute 10/19/2021 1.93  0.70 - 3.10 10*3/mm3 Final   • Monocytes, Absolute 10/19/2021 0.57  0.10 - 0.90 10*3/mm3 Final   • Eosinophils, Absolute 10/19/2021 0.33  0.00 - 0.40 10*3/mm3 Final   • Basophils, Absolute 10/19/2021 0.04  0.00 - 0.20 10*3/mm3 Final   • Immature Grans, Absolute 10/19/2021 0.02  0.00 - 0.05 10*3/mm3 Final   • nRBC 10/19/2021 0.0  0.0 - 0.2 /100 WBC Final   Office Visit on 10/08/2021   Component Date Value Ref Range Status   • Glucose 10/08/2021 90  65 - 99 mg/dL Final   • BUN 10/08/2021 8  6 - 20 mg/dL Final   • Creatinine 10/08/2021 0.72  0.57 - 1.00 mg/dL Final   • eGFR Non  Am 10/08/2021 87  >60 mL/min/1.73 Final    Comment: GFR Normal >60  Chronic Kidney Disease <60  Kidney Failure <15     • eGFR  Am 10/08/2021 106  >60 mL/min/1.73 Final   • BUN/Creatinine Ratio 10/08/2021 11.1  7.0 - 25.0 Final   • Sodium 10/08/2021 144  136 - 145 mmol/L Final   • Potassium 10/08/2021 4.4  3.5 - 5.2 mmol/L Final   • Chloride 10/08/2021 106  98 - 107 mmol/L Final   • Total CO2 10/08/2021 27.4  22.0 - 29.0 mmol/L Final   • Calcium 10/08/2021 9.9  8.6 - 10.5 mg/dL Final   • Total Protein 10/08/2021 7.2  6.0 - 8.5 g/dL Final   • Albumin 10/08/2021 4.70  3.50 - 5.20 g/dL Final   • Globulin 10/08/2021 2.5  gm/dL Final   • A/G Ratio 10/08/2021 1.9  g/dL Final   • Total Bilirubin 10/08/2021 0.7  0.0 - 1.2 mg/dL Final   • Alkaline Phosphatase 10/08/2021 110  39 - 117 U/L Final   • AST (SGOT) 10/08/2021 14  1 - 32 U/L Final   • ALT (SGPT) 10/08/2021 18  1 - 33 U/L Final   • Total Cholesterol 10/08/2021 216* 0 - 200 mg/dL Final    Comment: Cholesterol Reference Ranges  (U.S.  Department of Health and Human Services ATP III  Classifications)  Desirable          <200 mg/dL  Borderline High    200-239 mg/dL  High Risk          >240 mg/dL  Triglyceride Reference Ranges  (U.S. Department of Health and Human Services ATP III  Classifications)  Normal           <150 mg/dL  Borderline High  150-199 mg/dL  High             200-499 mg/dL  Very High        >500 mg/dL  HDL Reference Ranges  (U.S. Department of Health and Human Services ATP III  Classifcations)  Low     <40 mg/dl (major risk factor for CHD)  High    >60 mg/dl ('negative' risk factor for CHD)  LDL Reference Ranges  (U.S. Department of Health and Human Services ATP III  Classifcations)  Optimal          <100 mg/dL  Near Optimal     100-129 mg/dL  Borderline High  130-159 mg/dL  High             160-189 mg/dL  Very High        >189 mg/dL     • Triglycerides 10/08/2021 166* 0 - 150 mg/dL Final   • HDL Cholesterol 10/08/2021 45  40 - 60 mg/dL Final   • VLDL Cholesterol Geremias 10/08/2021 30  5 - 40 mg/dL Final   • LDL Chol Calc ([facility]) 10/08/2021 141* 0 - 100 mg/dL Final   • 25 Hydroxy, Vitamin D 10/08/2021 27.2* 30.0 - 100.0 ng/ml Final    Comment: Results may be falsely increased if patient taking Biotin.  Reference Range for Total Vitamin D 25(OH)  Deficiency <20.0 ng/mL  Insufficiency 21-29 ng/mL  Sufficiency  ng/mL  Toxicity >100 ng/ml          DEXA Bone Density Axial    Result Date: 10/12/2021  Normal bone density.  This report was finalized on 10/12/2021 1:04 PM by Dr. Marvel Nino M.D.         4/19/2021  CBC-WBC 5.69, hemoglobin 14.3, platelets 340,000  CMP-BUN 10, creatinine 0.67, potassium 4, other electrolytes normal, LFTs normal.    11/3/2020-bilateral screening mammogram, 12/15/2020 diagnostic mammogram of the left breast and ultrasound, 1/25/2021 MRI of the breast, 1/25/2021 right breast diagnostic mammogram and ultrasound-images have been independently reviewed and interpreted by me and details have been summarized in  HPI.    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  · Initially she had some trouble tolerating anastrozole including arthralgias, hot flashes, fatigue and insomnia  · The symptoms have improved.  · She has also been exercising regularly which has helped with the symptoms  · She has lost about 3 pounds since her last visit.  · Continue anastrozole 1 mg p.o. daily for total of 5 years.    *Bone health-DEXA scan 10/12/2021 with normal bone mineral density.  Repeat again in 2 years.    *Abnormal lipids-likely secondary to anastrozole.  Monitor closely and start medications if needed    *Screening-continue with annual screening mammograms of the right breast.  She is due for screening mammogram on 11/4/2021.  However she just had her COVID-19 booster dose a week ago.  I advised her to call radiology and discuss if it would be appropriate for her to come in for mammogram.    *Follow-up-6 months

## 2021-10-22 RX ORDER — ANASTROZOLE 1 MG/1
TABLET ORAL
Qty: 90 TABLET | Refills: 3 | Status: SHIPPED | OUTPATIENT
Start: 2021-10-22 | End: 2022-09-26 | Stop reason: ALTCHOICE

## 2021-10-22 NOTE — TELEPHONE ENCOUNTER
Anastrozole refill request rec electronically from [facility]. Per last office note from Dr Mariano-pt is to continue.    Continue anastrozole 1 mg p.o. daily for total of 5 years.     I have routed the rx to Dr Mariano for signature. Once signed it will be escribed to [facility].

## 2021-10-29 ENCOUNTER — TELEPHONE (OUTPATIENT)
Dept: SURGERY | Facility: CLINIC | Age: 46
End: 2021-10-29

## 2021-10-29 NOTE — TELEPHONE ENCOUNTER
Appointment with Dr. Washington has been rescheduled to 2/7/2022 @ 10:00am.    Called and spoke to patient, patient expressed v/u of appointment time.  ----- Message from Tammy Shell sent at 10/19/2021  3:24 PM EDT -----  Patient scheduled to see Dr. Washington 11/15. She just had her booster on 10/16 and had to reschedule her mammogram and states the next available was 2/1/22 so she needs to reschedule with Dr. Washington.

## 2021-11-04 ENCOUNTER — APPOINTMENT (OUTPATIENT)
Dept: MAMMOGRAPHY | Facility: HOSPITAL | Age: 46
End: 2021-11-04

## 2022-01-26 NOTE — TELEPHONE ENCOUNTER
Scheduled Bilateral Breast MRI w wo contrast [facility] 1-25-21 arrive 7:15am    Scheduled Rt 3D Diag Mammo and Rt Breast U/S [facility] 1-25-21 same day.    Spoke to pt she is aware of appts.    Hailey   A-T Advancement Flap Text: The defect edges were debeveled with a #15 scalpel blade.  Given the location of the defect, shape of the defect and the proximity to free margins an A-T advancement flap was deemed most appropriate.  Using a sterile surgical marker, an appropriate advancement flap was drawn incorporating the defect and placing the expected incisions within the relaxed skin tension lines where possible.    The area thus outlined was incised deep to adipose tissue with a #15 scalpel blade.  The skin margins were undermined to an appropriate distance in all directions utilizing iris scissors.

## 2022-02-01 ENCOUNTER — HOSPITAL ENCOUNTER (OUTPATIENT)
Dept: MAMMOGRAPHY | Facility: HOSPITAL | Age: 47
Discharge: HOME | End: 2022-02-01

## 2022-02-01 ENCOUNTER — TELEPHONE (OUTPATIENT)
Dept: SURGERY | Facility: CLINIC | Age: 47
End: 2022-02-01

## 2022-02-01 ENCOUNTER — TRANSCRIBE ORDERS (OUTPATIENT)
Dept: SURGERY | Facility: CLINIC | Age: 47
End: 2022-02-01

## 2022-02-01 DIAGNOSIS — D05.12 DUCTAL CARCINOMA IN SITU (DCIS) OF LEFT BREAST: ICD-10-CM

## 2022-02-01 DIAGNOSIS — N63.10 LUMP OF RIGHT BREAST: Primary | ICD-10-CM

## 2022-02-01 NOTE — TELEPHONE ENCOUNTER
Mary Jane in Mammo called requesting orders for right dx MMG & US for right breast lump, sent orders to Paula, orders signed.

## 2022-02-02 ENCOUNTER — HOSPITAL ENCOUNTER (OUTPATIENT)
Dept: ULTRASOUND IMAGING | Facility: HOSPITAL | Age: 47
Discharge: HOME | End: 2022-02-02

## 2022-02-02 ENCOUNTER — HOSPITAL ENCOUNTER (OUTPATIENT)
Dept: MAMMOGRAPHY | Facility: HOSPITAL | Age: 47
Discharge: HOME | End: 2022-02-02

## 2022-02-02 DIAGNOSIS — N63.10 LUMP OF RIGHT BREAST: ICD-10-CM

## 2022-02-02 PROCEDURE — 76642 ULTRASOUND BREAST LIMITED: CPT

## 2022-02-02 PROCEDURE — G0279 TOMOSYNTHESIS, MAMMO: HCPCS

## 2022-02-02 PROCEDURE — 77065 DX MAMMO INCL CAD UNI: CPT

## 2022-02-03 ENCOUNTER — TELEPHONE (OUTPATIENT)
Dept: SURGERY | Facility: CLINIC | Age: 47
End: 2022-02-03

## 2022-02-03 DIAGNOSIS — R92.8 ABNORMAL MAMMOGRAM: Primary | ICD-10-CM

## 2022-02-03 NOTE — TELEPHONE ENCOUNTER
[facility] mammogram right diagnostic mammogram and right breast ultrasound February 2, 2022 for palpable right lump for 1 week.  The breasts are heterogenous Charles dense.  Marker overlying area of concern is the outer central anterior right breast.  There is persistent mild questioned area of architectural distortion.  This is somewhat near a linear surgical scar marker.  On ultrasound right breast  At 8:00, 6 cm from the nipple there is a 7 mm complicated cyst versus solid mass which is suspicious  At 9:00 retroareolar there is a 9 mm complicated cyst which is probably benign.  At 9:00, 2 cm from the nipple there are 2 4 mm complicated versus cyst versus solid which are probably benign  At 9:00 3 cm from the nipple there is a 6 mm cyst versus solid mass which is probably benign  At 9:00, 4 cm from the nipple there is a 7 mm complicated cyst versus solid mass which is probably benign  At 830, 7 cm from the nipple there is a 1.9 x 1.3 cm hypoechoic mass which is suspicious  No definite sonographic correlate is identified for the questioned area of distortion right breast.  Impression #1 recommend stereotactic biopsy for the architectural torsion outer central anterior right breast.  2.  Right breast 8:00 and 830 recommend 2 times ultrasound-guided core needle biopsy.  Multiple masses at 9:00 are probably benign, if the above results are benign recommend ultrasound in 6 months.  This would be due August 2020 to [facility].    We will arrange for a right stereotactic biopsy, right ultrasound-guided biopsy x2, postbiopsy mammogram and then she will see either myself or Paula Hernandez back thereafter.

## 2022-02-03 NOTE — TELEPHONE ENCOUNTER
I let her know that the radiologist has recommended 3 different biopsies on the right side   Dr. Washington has reviewed her reports.  We've arranged following:    RT stereotactic biopsy [facility] Tuesday 2/22/22 arrive 11:30 and a right ultrasound-guided biopsy x 2 to follow same day.   For a total of 3 biopsies at [facility] with a post biopsy mammogram.      If her biopsies are benign she may see Paula thereafter if they are atypical or papilloma she will need to see Dr. Washington.     Follow up apt pending path results.

## 2022-02-22 ENCOUNTER — HOSPITAL ENCOUNTER (OUTPATIENT)
Dept: MAMMOGRAPHY | Facility: HOSPITAL | Age: 47
Discharge: HOME | End: 2022-02-22

## 2022-02-22 ENCOUNTER — HOSPITAL ENCOUNTER (OUTPATIENT)
Dept: ULTRASOUND IMAGING | Facility: HOSPITAL | Age: 47
Discharge: HOME | End: 2022-02-22

## 2022-02-22 VITALS
HEIGHT: 65 IN | WEIGHT: 207 LBS | HEART RATE: 67 BPM | DIASTOLIC BLOOD PRESSURE: 82 MMHG | OXYGEN SATURATION: 100 % | BODY MASS INDEX: 34.49 KG/M2 | SYSTOLIC BLOOD PRESSURE: 121 MMHG | RESPIRATION RATE: 16 BRPM | TEMPERATURE: 98.1 F

## 2022-02-22 VITALS
DIASTOLIC BLOOD PRESSURE: 85 MMHG | OXYGEN SATURATION: 100 % | SYSTOLIC BLOOD PRESSURE: 132 MMHG | RESPIRATION RATE: 16 BRPM | HEART RATE: 63 BPM

## 2022-02-22 DIAGNOSIS — R93.89 ABNORMAL ULTRASOUND: ICD-10-CM

## 2022-02-22 PROCEDURE — 88342 IMHCHEM/IMCYTCHM 1ST ANTB: CPT | Performed by: SURGERY

## 2022-02-22 PROCEDURE — 88341 IMHCHEM/IMCYTCHM EA ADD ANTB: CPT | Performed by: SURGERY

## 2022-02-22 PROCEDURE — A4648 IMPLANTABLE TISSUE MARKER: HCPCS

## 2022-02-22 PROCEDURE — 0 LIDOCAINE 1 % SOLUTION: Performed by: RADIOLOGY

## 2022-02-22 PROCEDURE — 88305 TISSUE EXAM BY PATHOLOGIST: CPT | Performed by: SURGERY

## 2022-02-22 PROCEDURE — 0 LIDOCAINE 1 % SOLUTION: Performed by: SURGERY

## 2022-02-22 RX ORDER — LIDOCAINE HYDROCHLORIDE AND EPINEPHRINE 10; 10 MG/ML; UG/ML
10 INJECTION, SOLUTION INFILTRATION; PERINEURAL ONCE
Status: COMPLETED | OUTPATIENT
Start: 2022-02-22 | End: 2022-02-22

## 2022-02-22 RX ORDER — LIDOCAINE HYDROCHLORIDE 10 MG/ML
3 INJECTION, SOLUTION INFILTRATION; PERINEURAL ONCE
Status: DISCONTINUED | OUTPATIENT
Start: 2022-02-22 | End: 2022-02-23 | Stop reason: HOSPADM

## 2022-02-22 RX ORDER — LIDOCAINE HYDROCHLORIDE 10 MG/ML
3 INJECTION, SOLUTION INFILTRATION; PERINEURAL ONCE
Status: COMPLETED | OUTPATIENT
Start: 2022-02-22 | End: 2022-02-22

## 2022-02-22 RX ORDER — LIDOCAINE HYDROCHLORIDE 10 MG/ML
1 INJECTION, SOLUTION INFILTRATION; PERINEURAL ONCE
Status: COMPLETED | OUTPATIENT
Start: 2022-02-22 | End: 2022-02-22

## 2022-02-22 RX ADMIN — LIDOCAINE HYDROCHLORIDE 3 ML: 10 INJECTION, SOLUTION INFILTRATION; PERINEURAL at 14:38

## 2022-02-22 RX ADMIN — LIDOCAINE HYDROCHLORIDE,EPINEPHRINE BITARTRATE 6 ML: 10; .01 INJECTION, SOLUTION INFILTRATION; PERINEURAL at 14:38

## 2022-02-22 RX ADMIN — LIDOCAINE HYDROCHLORIDE,EPINEPHRINE BITARTRATE 7 ML: 10; .01 INJECTION, SOLUTION INFILTRATION; PERINEURAL at 14:39

## 2022-02-22 RX ADMIN — Medication 1 ML: at 15:09

## 2022-02-22 RX ADMIN — Medication 1 ML: at 13:18

## 2022-02-22 RX ADMIN — LIDOCAINE HYDROCHLORIDE 10 ML: 10; .005 INJECTION, SOLUTION EPIDURAL; INFILTRATION; INTRACAUDAL; PERINEURAL at 13:19

## 2022-02-22 NOTE — NURSING NOTE
Pt was a stereo to right breast and an US biopsy site x2 to right breast. Site clear with Skin Exofin dry and intact. No firmness or swelling noted at or around biopsy site. Denies pain. Ice pack with protective covering applied to biopsy site. Discharge instructions discussed with understanding voiced by patient. Pt had post mammo.  stated they were good images, pt ok to leave. Copies of discharge instructions provided to patient. No distress noted. To home via private vehicle.

## 2022-02-22 NOTE — NURSING NOTE
VSS. Denies pain. Medical/surgical history and medications reviewed. No distress noted. Procedural education completed with patient's questions addressed. Goggles and mask in place by nurse with all interaction. Pt wore mask

## 2022-02-24 LAB
LAB AP CASE REPORT: NORMAL
LAB AP CASE REPORT: NORMAL
LAB AP CLINICAL INFORMATION: NORMAL
LAB AP CLINICAL INFORMATION: NORMAL
LAB AP SPECIAL STAINS: NORMAL
LAB AP SPECIAL STAINS: NORMAL
PATH REPORT.FINAL DX SPEC: NORMAL
PATH REPORT.FINAL DX SPEC: NORMAL
PATH REPORT.GROSS SPEC: NORMAL
PATH REPORT.GROSS SPEC: NORMAL

## 2022-03-01 ENCOUNTER — TELEPHONE (OUTPATIENT)
Dept: SURGERY | Facility: CLINIC | Age: 47
End: 2022-03-01

## 2022-03-01 NOTE — TELEPHONE ENCOUNTER
Results from right breast biopsies reported to patient, all benign and concordant , follow up recommended.  Clinic follow up scheduled for 5/11/22.    IMPRESSION:     1. Stereotactic/Tomosynthesis guided core needle biopsy of  questioned/mild architectural distortion in the outer anterior right  breast, marked with a bowtie biopsy clip. Pathology is benign and does  not explain architectural distortion, however, the distortion on  mammogram was questioned/mild with no sonographic correlate, and the  area was adequately sampled. As such, this is felt to be concordant.     2. Ultrasound-guided core needle biopsy of 1.9 cm mass at 8:30, 7 cm  from the nipple in the right breast, marked with a triple twist clip.  Pathology is benign and concordant with the imaging assessment.     3. Ultrasound-guided core needle biopsy of a 0.7 cm mass at 8:00, 6 cm  from the nipple in the right breast, marked with a wing clip. Pathology  is benign and concordant with the imaging assessment.     4. Short-term follow-up imaging of multiple additional right breast  masses is again recommended. Please refer to the diagnostic imaging  report from 2/2/2022 for additional details. Further management of the  new patient indicated palpable area in the right breast should be based  on clinical assessment.

## 2022-04-15 ENCOUNTER — LAB (OUTPATIENT)
Dept: LAB | Facility: HOSPITAL | Age: 47
End: 2022-04-15

## 2022-04-15 ENCOUNTER — OFFICE VISIT (OUTPATIENT)
Dept: ONCOLOGY | Facility: CLINIC | Age: 47
End: 2022-04-15

## 2022-04-15 VITALS
HEIGHT: 65 IN | OXYGEN SATURATION: 98 % | BODY MASS INDEX: 35.1 KG/M2 | SYSTOLIC BLOOD PRESSURE: 138 MMHG | DIASTOLIC BLOOD PRESSURE: 78 MMHG | TEMPERATURE: 97.1 F | RESPIRATION RATE: 16 BRPM | WEIGHT: 210.7 LBS | HEART RATE: 63 BPM

## 2022-04-15 DIAGNOSIS — Z78.0 POSTMENOPAUSAL: ICD-10-CM

## 2022-04-15 DIAGNOSIS — N60.92 ATYPICAL DUCTAL HYPERPLASIA OF LEFT BREAST: ICD-10-CM

## 2022-04-15 DIAGNOSIS — N60.92 ATYPICAL DUCTAL HYPERPLASIA OF LEFT BREAST: Primary | ICD-10-CM

## 2022-04-15 LAB
ALBUMIN SERPL-MCNC: 4.8 G/DL (ref 3.5–5.2)
ALBUMIN/GLOB SERPL: 1.7 G/DL (ref 1.1–2.4)
ALP SERPL-CCNC: 119 U/L (ref 38–116)
ALT SERPL W P-5'-P-CCNC: 23 U/L (ref 0–33)
ANION GAP SERPL CALCULATED.3IONS-SCNC: 9.8 MMOL/L (ref 5–15)
AST SERPL-CCNC: 15 U/L (ref 0–32)
BASOPHILS # BLD AUTO: 0.03 10*3/MM3 (ref 0–0.2)
BASOPHILS NFR BLD AUTO: 0.5 % (ref 0–1.5)
BILIRUB SERPL-MCNC: 0.7 MG/DL (ref 0.2–1.2)
BUN SERPL-MCNC: 12 MG/DL (ref 6–20)
BUN/CREAT SERPL: 17.9 (ref 7.3–30)
CALCIUM SPEC-SCNC: 9.9 MG/DL (ref 8.5–10.2)
CHLORIDE SERPL-SCNC: 105 MMOL/L (ref 98–107)
CO2 SERPL-SCNC: 25.2 MMOL/L (ref 22–29)
CREAT SERPL-MCNC: 0.67 MG/DL (ref 0.6–1.1)
DEPRECATED RDW RBC AUTO: 43.5 FL (ref 37–54)
EGFRCR SERPLBLD CKD-EPI 2021: 109.3 ML/MIN/1.73
EOSINOPHIL # BLD AUTO: 0.48 10*3/MM3 (ref 0–0.4)
EOSINOPHIL NFR BLD AUTO: 7.8 % (ref 0.3–6.2)
ERYTHROCYTE [DISTWIDTH] IN BLOOD BY AUTOMATED COUNT: 13.1 % (ref 12.3–15.4)
GLOBULIN UR ELPH-MCNC: 2.8 GM/DL (ref 1.8–3.5)
GLUCOSE SERPL-MCNC: 103 MG/DL (ref 74–124)
HCT VFR BLD AUTO: 43.3 % (ref 34–46.6)
HGB BLD-MCNC: 14.5 G/DL (ref 12–15.9)
IMM GRANULOCYTES # BLD AUTO: 0.02 10*3/MM3 (ref 0–0.05)
IMM GRANULOCYTES NFR BLD AUTO: 0.3 % (ref 0–0.5)
LYMPHOCYTES # BLD AUTO: 1.93 10*3/MM3 (ref 0.7–3.1)
LYMPHOCYTES NFR BLD AUTO: 31.4 % (ref 19.6–45.3)
MCH RBC QN AUTO: 30.4 PG (ref 26.6–33)
MCHC RBC AUTO-ENTMCNC: 33.5 G/DL (ref 31.5–35.7)
MCV RBC AUTO: 90.8 FL (ref 79–97)
MONOCYTES # BLD AUTO: 0.4 10*3/MM3 (ref 0.1–0.9)
MONOCYTES NFR BLD AUTO: 6.5 % (ref 5–12)
NEUTROPHILS NFR BLD AUTO: 3.28 10*3/MM3 (ref 1.7–7)
NEUTROPHILS NFR BLD AUTO: 53.5 % (ref 42.7–76)
NRBC BLD AUTO-RTO: 0 /100 WBC (ref 0–0.2)
PLATELET # BLD AUTO: 301 10*3/MM3 (ref 140–450)
PMV BLD AUTO: 10.6 FL (ref 6–12)
POTASSIUM SERPL-SCNC: 4 MMOL/L (ref 3.5–4.7)
PROT SERPL-MCNC: 7.6 G/DL (ref 6.3–8)
RBC # BLD AUTO: 4.77 10*6/MM3 (ref 3.77–5.28)
SODIUM SERPL-SCNC: 140 MMOL/L (ref 134–145)
WBC NRBC COR # BLD: 6.14 10*3/MM3 (ref 3.4–10.8)

## 2022-04-15 PROCEDURE — 36415 COLL VENOUS BLD VENIPUNCTURE: CPT

## 2022-04-15 PROCEDURE — 99214 OFFICE O/P EST MOD 30 MIN: CPT | Performed by: INTERNAL MEDICINE

## 2022-04-15 PROCEDURE — 85025 COMPLETE CBC W/AUTO DIFF WBC: CPT

## 2022-04-15 PROCEDURE — 80053 COMPREHEN METABOLIC PANEL: CPT

## 2022-04-15 NOTE — PROGRESS NOTES
Subjective   Ernestina Urrutia is a 46 y.o. female.  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  Prior to noticing the nipple discharge patient had a negative screening mammogram in November 2020.      12/15/2020-left diagnostic mammogram and ultrasound was performed for further evaluation of nipple discharge  On the mammogram the breasts were noted to be heterogeneously dense.  Benign-appearing calcifications were noted.  Multiple masses and/or ducts in the retroareolar left breast were noted.    Ultrasound-at 6 o'clock position 1 cm from the nipple there was a 0.5 x 0.4 x 0.5 cm complicated cyst versus solid mass contiguous with the duct was noted.  There were numerous benign-appearing cysts and ducts.  MRI was recommended for further evaluation.  Also ultrasound-guided biopsy of the 6 o'clock position mass was recommended.    1/5/2021-ultrasound-guided biopsy-pathology consistent with atypical ductal hyperplasia bordering on low-grade ductal carcinoma in situ.  Consultation was obtained from Dr. Cristopher Rasmussen and pathology was consistent with low-grade ductal carcinoma in situ, micropapillary type, atypical lobular hyperplasia, sclerosing adenosis and intraductal papilloma was noted.    1/25/2021-bilateral breast MRI-biopsy clip noted at 6 o'clock position of the left breast.  No suspicious masses or enhancement noted at this location.  Suspicious 1.2 cm focal area of clumped non-mass enhancement noted at 9 o'clock position.  MRI guided core needle biopsy recommended.  In the right breast there is a 2.1 x 1.4 x 1.3 cm cyst.    1/25/2021-right breast diagnostic mammogram and ultrasound-heterogeneously dense breast.  No suspicious masses or calcifications.  Ultrasound-at 6 o'clock position retroareolar there is a 2 x 1.2 x 1.7 cm  benign-appearing complicated cyst with mobile internal debris.  Adjacent smaller cysts and ectatic ducts noted.    2/9/2021-MRI guided biopsy of the 9 o'clock position mass-pathology consistent with preoperative breast parenchyma with sclerotic intraductal papillomas.  No atypical ductal hyperplasia or lobular hyperplasia or in situ or invasive carcinoma.    Patient underwent a left breast mastectomy and sentinel lymph node biopsy on 3/25/2021.  Pathology did not show any residual ductal carcinoma in situ or invasive carcinoma.  Stage pTis N0 M0, staging based on the presence of DCIS on the core biopsy.  DCIS of the core biopsy measured 3 mm.  DCIS was low-grade, ER +%, CA +51-60%.Multiple intraductal papillomas were noted.    She has been referred to discuss adjuvant endocrine therapy/risk reduction    She had been on hormone replacement therapy for about 2 years since 2018 to end of 2020.  She underwent a total hysterectomy in 2018.  This was stopped after diagnosis of DCIS.  She reports occasional hot flashes but no other menopausal symptoms at this time.  She does not have any family history of breast cancer.    2/2/2022 she underwent a diagnostic mammogram of the right breast for concerns of a palpable mass in the right breast.  There was an area of architectural distortion noted on the mammogram.  An ultrasound was performed for further evaluation.  At 8:00, 6 cm from the nipple there was a palpable 0.7 time 0.5 time 0.5 cm complicated cyst versus solid mass which was suspicious  At 9:00 in the retroareolar right breast there was a 0.9 time 0.19 0.9 cm complicated cyst which is probably benign  At 9:00 2 cm from the nipple there is a 0.4 time 0.4 time 0.4 cm complicated cyst versus solid mass which are probably benign  At 9:00 3 cm from the nipple there is a 0.6 x 0.5 x 0.4 cm cyst versus solid mass probably benign  At 9:00, 4 cm from the nipple there is a 0.6 x 0.7 x 0.7 cm complicated cyst versus solid  mass probably benign.  At 8:30 position, 7 cm from the nipple there was a 1.9 time 0.7 x 1.3 cm hypoechoic mass which was suspicious.    Impression  Suspicious questioned architectural distortion in the anterior right breast, further evaluation with stereotactic biopsy recommended  Masses at 8 and 830 in the right breast are suspicious and ultrasound-guided core needle biopsy recommended.  Given multiple breast masses annual screening with MRI has been recommended.    2/22/2022-multiple breast biopsies  1.right breast 8:30 position, 7 cm from the nipple  A.florid ductal hyperplasia of usual type  B.apocrine metaplasia  C.fibroadenomatoid hyperplasia  Needed sclerosing adenosis    2.right breast 8:00, 6 cm from the nipple-benign breast tissue with features consistent with simple cyst, microcalcifications and nonneoplastic tissue, focal chronic inflammation, fibroadenomatoid hyperplasia.    3.right breast 9:00-benign breast tissue with fibroadenomatoid hyperplasia, simple cyst, calcifications and nonneoplastic tissue and apocrine metaplasia.    Interval history  Ms. Urrutia presents to the clinic today for follow-up.  She remains compliant with anastrozole.  She is complaining of left upper extremity pain going up all the way to her neck.  Her mother had coronary artery disease and required a CABG at the age of 62 and patient is worried that the symptoms are related to cardiac disease.  She had a recent abnormal mammogram on the right breast after she felt a mass in February 2022.  Required 3 biopsies on the right breast.  All the biopsies returned benign however patient is extremely concerned about the multiple imaging abnormalities and is concerned about the risk of malignancy in the right breast.  She tells me that she wants to undergo a right mastectomy although she understands that there was nothing malignant or there were no high risk lesions necessarily.  She was exercising regularly previously however due to  cold weather she has not been able to do so and plans to exercise once the weather gets warmer.  She has gained a few pounds since her last visit.  No other complaints at this time.    The following portions of the patient's history were reviewed and updated as appropriate: allergies, current medications, past family history, past medical history, past social history, past surgical history and problem list.    Past Medical History:   Diagnosis Date   • Acid reflux    • Allergic 09/2000    Seasonal allergies   • Breast cancer (HCC)    • Breast cyst    • Cholelithiasis 11/2010    Gallbladet removed in November 2010   • DCIS (ductal carcinoma in situ)     LEFT BREAST   • Drug therapy    • Elevated cholesterol    • Fibrocystic breast    • History of hematuria     MICROSCOPIC   • History of kidney stones    • History of migraine    • Kidney stone 03/2020    [facility]   • Knee pain, left    • Obesity 05/2013    I have been a little overweight since 2013   • Rosacea    • Seasonal allergies         Past Surgical History:   Procedure Laterality Date   • AUGMENTATION MAMMAPLASTY     • BILATERAL OOPHORECTOMY     • BREAST BIOPSY Left 01/05/2021   • BREAST RECONSTRUCTION Left 3/25/2021    Procedure: LEFT PLACEMENT TISSUE EXPANDER AND ALLODERM;  Surgeon: Clifford Casper MD;  Location: [facility];  Service: Plastics;  Laterality: Left;   • BREAST TISSUE EXPANDER REMOVAL INSERTION OF IMPLANT Left 7/16/2021    Procedure: LEFT REMOVAL TISSUE EXPANDER AND PLACEMENT OF IMPLANT;  Surgeon: Clifford Casper MD;  Location: [facility];  Service: Plastics;  Laterality: Left;   • CHOLECYSTECTOMY     • CYSTOSCOPY     • FAT GRAFTING Left 7/16/2021    Procedure: FAT GRAFTING;  Surgeon: Clifford Casper MD;  Location: [facility] OR;  Service: Plastics;  Laterality: Left;   • HYSTERECTOMY     • MASTECTOMY Left    • MASTECTOMY W/ SENTINEL NODE BIOPSY Left 3/25/2021    Procedure: LEFT mastectomy,  "LEFT axilla sentinel node biopsy,  with or without reconstruction as her surgical treatment.;  Surgeon: Zenia Washington MD;  Location: [facility] MAIN OR;  Service: General;  Laterality: Left;   • MASTOPEXY Right 2021    Procedure: RIGHT MASTOPEXY FOR SYMMETRY;  Surgeon: Clifford Casper MD;  Location: [facility] MAIN OR;  Service: Plastics;  Laterality: Right;   • MRI BREAST BIOPSY UNILATERAL Left 02/10/2021   • OOPHORECTOMY     • SUBTOTAL HYSTERECTOMY  , , 2018    Partial in 3 surgeries, but still have cervix        Family History   Problem Relation Age of Onset   • Heart disease Mother         Triple CABG 2017   • Hypertension Mother    • Hyperlipidemia Mother    • Hyperlipidemia Father    • Hypertension Father    • Malig Hyperthermia Neg Hx         Social History     Socioeconomic History   • Marital status:    Tobacco Use   • Smoking status: Never Smoker   • Smokeless tobacco: Never Used   Vaping Use   • Vaping Use: Never used   Substance and Sexual Activity   • Alcohol use: Never   • Drug use: Never   • Sexual activity: Not Currently     Partners: Male     Birth control/protection: Abstinence, Surgical        OB History             Para        Term   0            AB        Living           SAB        IAB        Ectopic        Molar        Multiple        Live Births                   Age at menarche 12  Age at menopause-43  Patient does not have any children  She used birth control pills for 2 years  Hormone replacement therapy from 2018 to 2020    No Known Allergies       Review of systems as mentioned in the HPI      Objective   Blood pressure 138/78, pulse 63, temperature 97.1 °F (36.2 °C), temperature source Temporal, resp. rate 16, height 165.1 cm (65\"), weight 95.6 kg (210 lb 11.2 oz), SpO2 98 %, not currently breastfeeding.   Physical Exam  Vitals reviewed.   Constitutional:       General: She is not in acute distress.     Appearance: Normal " appearance. She is not ill-appearing, toxic-appearing or diaphoretic.   HENT:      Head: Normocephalic and atraumatic.      Right Ear: External ear normal.      Left Ear: External ear normal.      Nose: Nose normal.      Mouth/Throat:      Mouth: Mucous membranes are moist.      Pharynx: Oropharynx is clear.   Eyes:      Extraocular Movements: Extraocular movements intact.      Conjunctiva/sclera: Conjunctivae normal.      Pupils: Pupils are equal, round, and reactive to light.   Cardiovascular:      Rate and Rhythm: Normal rate.   Pulmonary:      Effort: Pulmonary effort is normal.      Breath sounds: Normal breath sounds.   Abdominal:      General: Abdomen is flat. Bowel sounds are normal.      Palpations: Abdomen is soft.   Musculoskeletal:         General: Normal range of motion.      Cervical back: Normal range of motion.   Skin:     General: Skin is warm.   Neurological:      General: No focal deficit present.      Mental Status: She is alert.   Psychiatric:         Mood and Affect: Mood normal.         Behavior: Behavior normal.         Thought Content: Thought content normal.         Judgment: Judgment normal.       Breast Exam: Right breast appears normal on inspection.  No palpable abnormalities of the right breast.   No palpable right axillary lymphadenopathy.  Left breast is status post mastectomy and reconstruction and nipple tattooing.    I have reexamined the patient and the results are consistent with the previously documented exam. Maritza Mariano MD     Lab on 04/15/2022   Component Date Value Ref Range Status   • WBC 04/15/2022 6.14  3.40 - 10.80 10*3/mm3 Final   • RBC 04/15/2022 4.77  3.77 - 5.28 10*6/mm3 Final   • Hemoglobin 04/15/2022 14.5  12.0 - 15.9 g/dL Final   • Hematocrit 04/15/2022 43.3  34.0 - 46.6 % Final   • MCV 04/15/2022 90.8  79.0 - 97.0 fL Final   • MCH 04/15/2022 30.4  26.6 - 33.0 pg Final   • MCHC 04/15/2022 33.5  31.5 - 35.7 g/dL Final   • RDW 04/15/2022 13.1  12.3 - 15.4 %  Final   • RDW-SD 04/15/2022 43.5  37.0 - 54.0 fl Final   • MPV 04/15/2022 10.6  6.0 - 12.0 fL Final   • Platelets 04/15/2022 301  140 - 450 10*3/mm3 Final   • Neutrophil % 04/15/2022 53.5  42.7 - 76.0 % Final   • Lymphocyte % 04/15/2022 31.4  19.6 - 45.3 % Final   • Monocyte % 04/15/2022 6.5  5.0 - 12.0 % Final   • Eosinophil % 04/15/2022 7.8 (A) 0.3 - 6.2 % Final   • Basophil % 04/15/2022 0.5  0.0 - 1.5 % Final   • Immature Grans % 04/15/2022 0.3  0.0 - 0.5 % Final   • Neutrophils, Absolute 04/15/2022 3.28  1.70 - 7.00 10*3/mm3 Final   • Lymphocytes, Absolute 04/15/2022 1.93  0.70 - 3.10 10*3/mm3 Final   • Monocytes, Absolute 04/15/2022 0.40  0.10 - 0.90 10*3/mm3 Final   • Eosinophils, Absolute 04/15/2022 0.48 (A) 0.00 - 0.40 10*3/mm3 Final   • Basophils, Absolute 04/15/2022 0.03  0.00 - 0.20 10*3/mm3 Final   • Immature Grans, Absolute 04/15/2022 0.02  0.00 - 0.05 10*3/mm3 Final   • nRBC 04/15/2022 0.0  0.0 - 0.2 /100 WBC Final        No radiology results for the last 30 days.     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  · Initially she had some trouble tolerating anastrozole including arthralgias, hot flashes, fatigue and insomnia  · The symptoms have improved.  · She is complaining of left upper extremity pain going all the way to her neck.  · She also had an abnormal diagnostic mammogram of the right breast requiring 3 biopsies which were benign.  · She is extremely concerned about the recent abnormal imaging and the need for biopsies and she is leaning towards undergoing a  prophylactic right breast mastectomy.  · She has an appointment with surgery in May and plans to discuss the same with them.  · Explained to her that if she underwent a prophylactic mastectomy and then she could discontinue anastrozole.    *Bone health-DEXA scan 10/12/2021 with normal bone mineral density.  Repeat again in 2 years.  Due October 2023.    *Abnormal lipids-likely secondary to anastrozole.  Continue monitoring yearly lipid panel.    *Screening-due February 2023.  MRI also recommended in addition to mammogram given multiple breast abnormalities.    *Follow-up-6 weeks to see if the left upper extremity pain has improved.  We discussed holding anastrozole for a week to 2 however she is reluctant to do so as she is concerned about the risk of breast cancer in the right breast.  If no improvement in the left upper extremity pain then we will consider holding anastrozole at that time.

## 2022-04-20 ENCOUNTER — APPOINTMENT (OUTPATIENT)
Dept: LAB | Facility: HOSPITAL | Age: 47
End: 2022-04-20

## 2022-05-11 ENCOUNTER — TELEPHONE (OUTPATIENT)
Dept: SURGERY | Facility: CLINIC | Age: 47
End: 2022-05-11

## 2022-05-11 ENCOUNTER — OFFICE VISIT (OUTPATIENT)
Dept: SURGERY | Facility: CLINIC | Age: 47
End: 2022-05-11

## 2022-05-11 VITALS
BODY MASS INDEX: 34.99 KG/M2 | HEART RATE: 69 BPM | SYSTOLIC BLOOD PRESSURE: 121 MMHG | HEIGHT: 65 IN | DIASTOLIC BLOOD PRESSURE: 82 MMHG | WEIGHT: 210 LBS

## 2022-05-11 DIAGNOSIS — R92.8 ABNORMAL FINDING ON BREAST IMAGING: ICD-10-CM

## 2022-05-11 DIAGNOSIS — N60.11 FIBROCYSTIC BREAST CHANGES, BILATERAL: ICD-10-CM

## 2022-05-11 DIAGNOSIS — D05.12 BREAST NEOPLASM, TIS (DCIS), LEFT: Primary | ICD-10-CM

## 2022-05-11 DIAGNOSIS — N60.12 FIBROCYSTIC BREAST CHANGES, BILATERAL: ICD-10-CM

## 2022-05-11 PROCEDURE — 99215 OFFICE O/P EST HI 40 MIN: CPT | Performed by: NURSE PRACTITIONER

## 2022-05-11 RX ORDER — CLOTRIMAZOLE AND BETAMETHASONE DIPROPIONATE 10; .64 MG/G; MG/G
CREAM TOPICAL SEE ADMIN INSTRUCTIONS
COMMUNITY
Start: 2022-03-09 | End: 2022-07-14 | Stop reason: SDUPTHER

## 2022-05-11 NOTE — PROGRESS NOTES
Chief Complaint: Ernestina Osuna is a 47 y.o. female who was seen in consultation at the request of No ref. provider found  for breast cancer, ADH and biopsy follow up    History of Present Illness:  2021:  Seen by Dr Washington  Patient presents with ADH/DCIS and abnormal imaging and breast mass.     She had a regular screening mammogram in November.  After her mammogram she noticed a single episode of nipple drainage from her left nipple when she reached down to scratch her left breast.  She only saw this 1 time and says that it was clear.  She then sought imaging for this as below.  After having her initial set of images, she noticed a fairly rapid onset burning tenderness to the right nipple area and behind the nipple.  For this we also did diagnostic imaging below.  On the left severe ADH bordering on duct carcinoma in situ was identified at 6:00 and a papilloma identified on MRI at 9:00 to account for her nipple drainage.  On the right at the site of the mass there was a 2 cm simple cyst.  As below.      She noted no new masses, skin changes,other  nipple changes prior to her most recent imaging.  Her most recent imaging includes the followin2020 BILATERAL SCREENING MAMMO WITH PAULA  [facility]        ERNESTINA OSUNA  Heterogeneously dense.  BI-RADS Category 2: Benign.    12/15/2020 LEFT DIAGNOSTIC MMG WITH PAULA & LEFT BREAST US        [facility]       ERNESTINA OSUNA  Single episode of spontaneous clear left nipple discharge. She states her left breast feels like it is on fire.  MMG:  Heterogeneously dense. There are multiple masses and/or ducts in the retroareolar left breast, which are located within the densest area of breast parenchyma.  US:  Left ultrasound, 6:00, 1 cm from the nipple, 0.5 x 0.4 x 0.5 cm complicated cyst versus solid mass, may be contiguous with a duct. Otherwise, numerous benign-appearing cysts and ducts are identified.  Complicated cyst versus solid mass at 6:00 in the left  breast, evaluation with ultrasound-guided core needle biopsy is recommended.  BI-RADS Category 4: Suspicious.      She had a biopsy on the following day that showed:   01/05/2021 LEFT DIAGNOSTIC MAMMO & US GUIDED BREAST BIOPSY      TOYA OSUNA  6:00, 1 cm from the nipple in the left breast, 11 g needle. 11 core specimens were obtained. A bowtie clip was then deployed. Bowtie clip at the expected location in the lower central anterior left breast.  Pathology demonstrates severe atypical ductal hyperplasia bordering on low-grade DCIS, which is concordant.  1. 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  Sections demonstrate and area of possible cyst wall/dilated duct with associated apocrine cysts and proliferative breast changes. Multiple ducts demonstrate areas of micropapillary atypia, which by IHC qualify as at least ADH. Definitive diagnosis is best deferred to the excision.    I then arranged for a MRI:    01/25/2021 BILATERAL BREAST MRI         [facility]           ALFREDO OSUNA  Subareolar right breast, 2.1 cm cyst, which corresponds to the palpable lump.  LEFT BREAST:   6:00, 3.6 cm posterior to the nipple, susceptibility from a biopsy clip, which marks the site of biopsy-proven atypia. No suspicious mass enhancement at this location.   9:00, 3.9 cm posterior to the nipple, there is a 1.2 cm focal area of clumped nonmass enhancement.  IMPRESSION AND RECOMMENDATION:  9:00, left breast. Further evaluation with MRI guided core needle biopsy is recommended.  BI-RADS Category 4: Suspicious.    01/25/2021 RIGHT DIAGNOSTIC MMG WITH PAULA & RIGHT BREAST US    [facility]     ALFREDO OSUNA  CLINICAL INDICATION: Right breast pain and hardening around the right nipple.  MMG:  Heterogeneously dense. There are no suspicious masses, calcifications, or areas of architectural distortion.  US:  Retroareolar right  breast, area of concern, 6:00 there is a palpable 2.0 benign-appearing complicated cyst.  IMPRESSION:  BI-RADS Category 2: Benign.    I then arranged for a MRI guided biopsy:  02/10/2021 MRI BREAST BIOPSY           [facility]   ALFREDO SHOREAS  An 8 gauge Mammotome. 14 core samples were obtained. A U-shaped clip.   Post-procedural mammographic views of the left breast demonstrate the U-shaped clip at the expected location in the inner central middle to anterior left breast, approximately 3.1 cm medial to the bowtie-shaped biopsy clip at the site of biopsy-proven atypia bordering on DCIS.  Pathology is high risk and concordant with the imaging assessment.  PATHOLOGY:  1. Left Breast, 9:00 o’clock, MRI-Guided Biopsies:  A. Proliferative breast parenchyma with sclerotic intraductal papillomas with florid duct hyperplasia, columnar cell hyperplasia and apocrine cysts.  B. Focal microcalcifications associated with columnar cell hyperplasia.  C. No atypical hyperplasia, in-situ nor invasive carcinoma identified.    She had not had a breast biopsy in the past.  She has had her uterus and ovaries removed, is postmenopausal, and has taken combination HRT since 2018  Her family history includes the following: her family is abroad and she does not know her FH.    3/4/2021:  In the interim, we received the following results:  Multi cancer panel dated February 24, 2021 for [facility] returned as no known mutations.  A variant of uncertain significance was identified in RECQL c.1031 G>A.     Her outside pathology review by Dr Choi returned as :  Low-grade duct carcinoma in situ, micropapillary type, atypical lobular hyperplasia, sclerosing adenosis, intraductal papilloma.     She reports persistent bruising at the LEFT LIQ biopsy site.  She is here to discuss the above and treatment.    4/9/2021:  Pathology from 3-25-21 LEFT total mastectomy and SLNB with expander reconstruction Dr Casper returned as :  Multiple intraductal  papillomas, sclerosing adenosis, usual hyperplasia, columnar cell change, apocrine cyst, fibroadenomatoid change, negative for malignancy.  0 of 1 sentinel node.  Pathologic stage Tis N0 stage 0.     Dr Fernando called me to comment on the numerous papillomas in the breast.     She will not need radiatio    She denies any redness warmth or drainage from her incision.  She is having only mild discomfort.     5/11/2022, Interval History:  She returns today for follow up with improved left arm discomfort with massage, some arthralgias r/t arimidex.  She still has residual biopsy discomfort, burning and itching of right nipple. She has decided that she wants to have a right total mastectomy as soon as possible.  Her  accompanies her today.    In 7/2021 she had TE/implant exchange left breast, right breast mastopexy with Dr Casper    Her last clinic visit with Dr Mariano was 4/15/22:  She remains compliant with anastrozole.  She is complaining of left upper extremity pain going up all the way to her neck.  Her mother had coronary artery disease and required a CABG at the age of 62 and patient is worried that the symptoms are related to cardiac disease.    In 1/2022 she noticed a right breast mass.  Right diagnostic mammogram and US were completed.  2/2/22: right diagnostic mammogram with tomosynthesis, right limited US at [facility] were BiRads 4 masses x 2 for which biopsy recommended. Multiple masses right breast at 0900 for which surveillance recommended if benign pathology.  (see full report below)  2/22/22: right US core biopsy x 2, right stereotactic biopsy x1, all benign and concordant with follow up in 6 months with right diagnostic mammogram/US.  At time of biopsy, patient reported a new palpable mass at 0600 with imaging findings c/w benign surgical changes.           Review of Systems:  Review of Systems   Constitutional: Positive for unexpected weight change (2 LB WT GAIN ).   Cardiovascular: Palpitations:  PALPITATIONS AT NIGHT , BUT NOT OFTEN, CLEARED BY CARDIOLOGY IN [place] AFTER ECHO AND HOLTER MONITOR.   Musculoskeletal: Arthralgias: LEFT KNEE PAIN    All other systems reviewed and are negative.       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 2/10/21 BENIGN.    Breast Cancer HIstory:  Patient does not have a past medical history of breast cancer.  Breast Operations, and year:  NONE   Obstetric/Gynecologic History:  Age menstrual periods began: 12  Patient is postmenopausal due to removal of her uterus in the following year: 43   Number of pregnancies:0  Number of live births: 0  Number of abortions or miscarriages: 0  Age of delivery of first child: N/A   BREAST FEEDING: N/A   Length of time taking birth control pills: 2 YRS   Patient is presently taking the following hormone replacement: ESTROGEL AND ORAL PROGESTERONE. STARTED 10/2018 TO PRESENT.     PATIENT HAD UTERUS AND BOTH OVARIES REMOVED.     Past Surgical History:   Procedure Laterality Date   • AUGMENTATION MAMMAPLASTY     • BILATERAL OOPHORECTOMY     • BREAST BIOPSY Left 01/05/2021   • BREAST RECONSTRUCTION Left 3/25/2021    Procedure: LEFT PLACEMENT TISSUE EXPANDER AND ALLODERM;  Surgeon: Clifford Casper MD;  Location: [facility];  Service: Plastics;  Laterality: Left;   • BREAST TISSUE EXPANDER REMOVAL INSERTION OF IMPLANT Left 7/16/2021    Procedure: LEFT REMOVAL TISSUE EXPANDER AND PLACEMENT OF IMPLANT;  Surgeon: Clifford Casper MD;  Location: [facility] OR;  Service: Plastics;  Laterality: Left;   • CHOLECYSTECTOMY     • CYSTOSCOPY     • FAT GRAFTING Left 7/16/2021    Procedure: FAT GRAFTING;  Surgeon: Clifford Casper MD;  Location: [facility] OR;  Service: Plastics;  Laterality: Left;   • HYSTERECTOMY     • MASTECTOMY Left    • MASTECTOMY W/  SENTINEL NODE BIOPSY Left 3/25/2021    Procedure: LEFT mastectomy, LEFT axilla sentinel node biopsy,  with or without reconstruction as her surgical treatment.;  Surgeon: Zenia Washington MD;  Location: [facility];  Service: General;  Laterality: Left;   • MASTOPEXY Right 7/16/2021    Procedure: RIGHT MASTOPEXY FOR SYMMETRY;  Surgeon: Clifford Casper MD;  Location: [facility] OR;  Service: Plastics;  Laterality: Right;   • MRI BREAST BIOPSY UNILATERAL Left 02/10/2021   • OOPHORECTOMY     • SUBTOTAL HYSTERECTOMY  2014, 2015, 2018    Partial in 3 surgeries, but still have cervix       Past Medical History:   Diagnosis Date   • Acid reflux    • Allergic 09/2000    Seasonal allergies   • Breast cancer (HCC)    • Breast cyst    • Cholelithiasis 11/2010    Gallbladet removed in November 2010   • DCIS (ductal carcinoma in situ)     LEFT BREAST   • Drug therapy    • Elevated cholesterol    • Fibrocystic breast    • History of hematuria     MICROSCOPIC   • History of kidney stones    • History of migraine    • Kidney stone 03/2020    [facility]   • Knee pain, left    • Obesity 05/2013    I have been a little overweight since 2013   • Rosacea    • Seasonal allergies        Prior Hospitalizations, other than for surgery or childbirth, and year:  NONE     Social History     Socioeconomic History   • Marital status:    Tobacco Use   • Smoking status: Never Smoker   • Smokeless tobacco: Never Used   Vaping Use   • Vaping Use: Never used   Substance and Sexual Activity   • Alcohol use: Never   • Drug use: Never   • Sexual activity: Not Currently     Partners: Male     Birth control/protection: Abstinence, Surgical     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    Family History:  Family History   Problem Relation Age of Onset   • Heart disease Mother         Triple CABG 03/07/2017   • Hypertension Mother    • Hyperlipidemia  "Mother    • Hyperlipidemia Father    • Hypertension Father    • Malig Hyperthermia Neg Hx        Vital Signs:  /82 (BP Location: Right arm)   Pulse 69   Ht 165.1 cm (65\")   Wt 95.3 kg (210 lb)   LMP  (LMP Unknown)   BMI 34.95 kg/m²      Medications:    Current Outpatient Medications:   •  anastrozole (ARIMIDEX) 1 MG tablet, TAKE 1 TABLET BY MOUTH EVERY DAY, Disp: 90 tablet, Rfl: 3  •  calcium carb-cholecalciferol 600-800 MG-UNIT tablet, Take  by mouth Daily., Disp: , Rfl:   •  clotrimazole-betamethasone (LOTRISONE) 1-0.05 % cream, Apply  topically to the appropriate area as directed See Admin Instructions. apply topically to the appropriate area as directed 2 times a day, Disp: , Rfl:   •  sennosides-docusate (PERICOLACE) 8.6-50 MG per tablet, Take 2 tablets by mouth Daily As Needed for Constipation., Disp: 30 tablet, Rfl: 1     Allergies:  No Known Allergies    Physical Examination:  /82 (BP Location: Right arm)   Pulse 69   Ht 165.1 cm (65\")   Wt 95.3 kg (210 lb)   LMP  (LMP Unknown)   BMI 34.95 kg/m²   General Appearance:  Patient is in no distress.  She is well kept and has an overweight build.   Psychiatric:  Patient with appropriate mood and affect. Alert and oriented to self, time, and place.    Breast, RIGHT:  medium sized, 38B, asymmetric with the contralateral reconstructed side. Well healed mastopexy surgical incisions and biopsy sites x 3 lateral aspect.  Breast skin is without erythema, edema, rashes.  There are no visible abnormalities upon inspection during the arm-raising maneuver or with hands on hips in the sitting position. There is no nipple retraction, discharge or nipple/areolar skin changes.There are no masses palpable in the sitting or supine positions.    Breast, LEFT:  Surgically absent with well-healed surgical incisions and implant in place.  No skin nodules or discolorations.    Lymphatic:  There is no axillary, cervical, infraclavicular, or supraclavicular " adenopathy bilaterally.  Musculoskeletal:  Good strength in all 4 extremities.   There is good range of motion in both shoulders.    Skin:  No new skin lesions or rashes on the skin excluding the breast (see breast exam above).        Imagin2021 RIGHT DIAGNOSTIC MMG WITH PAULA & RIGHT BREAST US    [facility]     ALFREDO OSUNA  CLINICAL INDICATION: Right breast pain and hardening around the right nipple.  MMG:  Heterogeneously dense. There are no suspicious masses, calcifications, or areas of architectural distortion.  US:  Retroareolar right breast, area of concern, 6:00 there is a palpable 2.0 benign-appearing complicated cyst.  IMPRESSION:  BI-RADS Category 2: Benign.    2022:  Right diagnostic mammogram, right limited US at [facility]  CLINICAL INDICATION: 46 years old woman presents for diagnostic evaluation of a palpable right breast lump for one week.    COMPARISON: Prior examinations dating back to 10/11/2019   FINDINGS:  MAMMOGRAM: Right CC and MLO standard and spot compression 2-D and Tomosynthesis views were obtained.     The breasts are heterogeneously dense, which may obscure small masses.    There is a marker overlying the outer central anterior right breast marking the area of concern indicated by the patient. Deep to the marker, there is a persistent mild/questioned area of architectural distortion. This is located somewhat near a linear surgical scar marker, however, the mammographic appearance is more conspicuous. This area was further assessed with ultrasound.  There are benign-appearing breast masses, which are not significantly changed. There are benign-appearing calcifications.    ULTRASOUND:  Targeted sonographic evaluation of the right breast was performed from 8:00 to 11:00 in the area of concern indicated by the patient and in the region of the mammographic abnormality.  At 8:00, 6 cm from the nipple, there is a palpable 0.7 x 0.5 x 0.5 cm oval hypoechoic complicated cyst versus solid  mass, which is suspicious.  At 9:00 in the retroareolar right breast, there is a 0.9 x 0.9 x 0.9 cm complicated cyst, which is probably benign.  At 9:00, 2 cm from the nipple, there are two 0.4 x 0.4 x 0.4 cm complicated cysts versus solid masses, which are probably benign.  At 9:00, 3 cm from the nipple, there is a 0.6 x 0.5 x 0.4 cm located cyst versus solid mass, which is probably benign.  At 9:00 to 4 cm from the nipple, there is a 0.6 x 0.7 x 0.7 cm complicated cyst versus solid mass, which is probably benign.  At 8:30, 7 cm from the nipple, there is a 1.9 x 0.7 x 1.3 cm hypoechoic mass with indistinct margins, which is suspicious.  Additional small benign-appearing cysts are also present.  No definite sonographic correlate is identified for the questioned area of distortion in the right breast.   IMPRESSION:  1.  Suspicious questioned/mild architectural distortion in the anterior right breast. Recommend further evaluation with  stereotactic/Tomosynthesis guided core needle biopsy.  2.  Masses at 8:00 and 8:30 in the right breast are suspicious. Recommend further evaluation with 2 site ultrasound-guided core needle biopsy.  3.  Multiple masses at 9:00 in the right breast are probably benign. If the above pathology results are benign, short-term follow-up targeted right breast ultrasound would be recommended in 6 months to document six-month stability.  4.  Given the patient's history of left breast malignancy and multiple breast masses, annual contrast-enhanced screening breast MRI is recommended, which would be due at this time.   BI-RADS Category 4: Suspicious    2/22/2022: Right US core biopsy x 2, right stereotactic biopsy x 1 at [facility]  PROCEDURE:   STEREOTACTIC: The questioned area of architectural distortion in the anterior right breast was/were localized and targeted under stereotactic/tomosynthesis guidance via a(n) lateral approach. The skin of the breast was cleansed and prepped. 1 ml of 1% lidocaine  and 10 ml of 1% lidocaine with epinephrine was used for local anesthesia. An 8 gauge needle used with a vacuum assisted biopsy device was advanced into the breast and 6 core specimens were obtained. Specimen radiography demonstrated mixed fibroglandular density suggestive of the targeted abnormality with incidental calcifications. A bowtie clip was then deployed at the biopsy site. The patient tolerated the procedure well with no immediate complications.   ULTRASOUND:  Targeted sonographic evaluation of the new area of concern indicated by the patient in the right breast was performed. At 6:00 in the right breast, medial to the reduction mammoplasty surgical scar, benign-appearing surgical changes are identified without a suspicious cystic or solid mass.   Site 1: The mass at 8:30, 7 cm from the nipple in the right breast was targeted under ultrasound. The skin of the breast was cleansed and prepped. 3 mL of 1% lidocaine and 6 mL of 1% lidocaine with epinephrine were used for local anesthesia. A small skin incision was then made to permit passage of a 10 g needle with a vacuum-assisted biopsy device. 6 core specimens were obtained. A triple twist clip was then deployed at the biopsy site.    Site 2: The mass at 8:00, 6 cm from the nipple in the right breast was targeted under ultrasound. The skin of the breast was cleansed and prepped. 7 mL of 1% lidocaine with epinephrine was used for local anesthesia. Using the same skin incision from site 1, a 10 g needle with a vacuum-assisted biopsy device was inserted into the breast. 4 core specimens were obtained. A wing clip was then deployed at the biopsy site.    The patient tolerated the procedures well with no immediate complications.   Post-procedural digital mammographic views of the right breast demonstrate the bowtie, triple twist, and a wing clip biopsy clips at the expected location at the respective sites of biopsy in the right breast.   PATHOLOGY:  Final  Diagnosis    1. Breast, Right, 8:30, 7 cm from Nipple, Biopsy: Benign breast tissue with  A. Florid ductal hyperplasia of the usual type.  B. Apocrine metaplasia.  C. Fibroadenomatoid hyperplasia.  D. Sclerosing adenosis.  ?  2. Breast, Right, 8 O'clock, 6 cm from Nipple, Biopsy: Benign breast tissue with   A. Features consistent with simple cyst.  B. Microcalcifications in non-neoplastic tissue.  C. Focal chronic inflammation.  D. Fibroadenomatoid hyperplasia.  ?  3. Breast, Right, 9 O'clock, Biopsy: Benign breast tissue with    A. Fibroadenomatoid hyperplasia.  B. Simple cyst.  C. Calcifications in non-neoplastic tissue.  D. Apocrine metaplasia.    Electronically signed by Mayco Patel MD on 2/24/2022 at 0756     IMPRESSION:   1. Stereotactic/Tomosynthesis guided core needle biopsy of questioned/mild architectural distortion in the outer anterior right breast, marked with a bowtie biopsy clip. Pathology is benign and does not explain architectural distortion, however, the distortion on mammogram was questioned/mild with no sonographic correlate, and the area was adequately sampled. As such, this is felt to be concordant.   2. Ultrasound-guided core needle biopsy of 1.9 cm mass at 8:30, 7 cm from the nipple in the right breast, marked with a triple twist clip. Pathology is benign and concordant with the imaging assessment.   3. Ultrasound-guided core needle biopsy of a 0.7 cm mass at 8:00, 6 cm from the nipple in the right breast, marked with a wing clip. Pathology is benign and concordant with the imaging assessment.   4. Short-term follow-up imaging of multiple additional right breast masses is again recommended. Please refer to the diagnostic imaging report from 2/2/2022 for additional details. Further management of the new patient indicated palpable area in the right breast should be based on clinical assessment.       Pathology:  01/05/2021 LEFT DIAGNOSTIC MAMMO & US GUIDED BREAST BIOPSY      [facility]       ALFREDO OSUNA  6:00, 1 cm from the nipple in the left breast, 11 g needle. 11 core specimens were obtained. A bowtie clip was then deployed. Bowtie clip at the expected location in the lower central anterior left breast.  Pathology demonstrates severe atypical ductal hyperplasia bordering on low-grade DCIS, which is concordant.  1. 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  Sections demonstrate and area of possible cyst wall/dilated duct with associated apocrine cysts and proliferative breast changes. Multiple ducts demonstrate areas of micropapillary atypia, which by IHC qualify as at least ADH. Definitive diagnosis is best deferred to the excision.    02/10/2021 MRI BREAST BIOPSY           [facility]   ALFREDO ENRRIQUE  An 8 gauge Mammotome. 14 core samples were obtained. A U-shaped clip.   Post-procedural mammographic views of the left breast demonstrate the U-shaped clip at the expected location in the inner central middle to anterior left breast, approximately 3.1 cm medial to the bowtie-shaped biopsy clip at the site of biopsy-proven atypia bordering on DCIS.  Pathology is high risk and concordant with the imaging assessment.  PATHOLOGY:  1. Left Breast, 9:00 o’clock, MRI-Guided Biopsies:  A. Proliferative breast parenchyma with sclerotic intraductal papillomas with florid duct hyperplasia, columnar cell hyperplasia and apocrine cysts.  B. Focal microcalcifications associated with columnar cell hyperplasia.  C. No atypical hyperplasia, in-situ nor invasive carcinoma identified.      Pathology from 3-25-21 LEFT total mastectomy and SLNB with expander reconstruction Dr Casper returned as :  Multiple intraductal papillomas, sclerosing adenosis, usual hyperplasia, columnar cell change, apocrine cyst, fibroadenomatoid change, negative for malignancy.  0 of 1 sentinel node.  Pathologic stage Tis N0 stage  0.     Dr Fernando called me to comment on the numerous papillomas in the breast.     She will not need radiation. I will call and let her know.     Will arrange for her to see medical oncology .          Final Diagnosis    1. Breast, Left, Mastectomy (912.6 grams):               A. Multiple intraductal papillomas, sclerosing adenosis, florid ductal hyperplasia of the usual type, columnar cell         change, clustered apocrine cysts, fibroadenomatoid change and scattered benign microcalcifications.  B. Negative for atypia, in situ and infiltrating carcinoma.     2. Lymph Node [place] #1, Excision:               A. Reactive lymph node (0/1).     3. Skin, Left Breast, Excision:               A. Benign skin and subcutaneous tissue.      [facility]     Electronically signed by Magdalena Fernando MD on 3/26/2021 at 1541    Synoptic Checklist    DCIS OF THE BREAST: Resection  8th Edition - Protocol posted: 2/26/2020  DCIS OF THE BREAST: COMPLETE EXCISION - All Specimens       SPECIMEN   Procedure   Total mastectomy    Specimen Laterality   Left    TUMOR   Tumor Site   Clock position        6 o'clock    Histologic Type   Ductal carcinoma in situ    Size (Extent) of DCIS   Estimated size (extent) of DCIS is at least (Millimeters): 3 (on core biopsy [id number]) mm   Architectural Patterns   Micropapillary    Nuclear Grade   Grade I (low)    Necrosis   Not identified    Microcalcifications   Present in nonneoplastic tissue    MARGINS   Margins   Uninvolved by DCIS    Distance from Closest Margin (Millimeters)   15 (measured from bowtie clip) mm   Closest Margin(s)   Anterior    LYMPH NODES   Regional Lymph Nodes   Uninvolved by tumor cells    Total Number of Lymph Nodes Examined   1    Number of [place] Nodes Examined   1    PATHOLOGIC STAGE CLASSIFICATION (pTNM, AJCC 8th Edition)       Primary Tumor (pT)   pTis (DCIS)    Regional Lymph Nodes Modifier   (sn): [place] node(s) evaluated    Regional Lymph Nodes (pN)   pN0    SPECIAL  STUDIES   Breast Biomarker Testing Performed on Previous Biopsy       Estrogen Receptor (ER) Status   Positive    Percentage of Cells with Nuclear Positivity   %    Breast Biomarker Testing Performed on Previous Biopsy       Progesterone Receptor (PgR) Status   Positive    Percentage of Cells with Nuclear Positivity   51-60%    Testing Performed on Case Number   [id number]    .      Comment      The patient's concurrent left breast core biopsy material is on file with our department ([id number]); slides are pulled and reviewed for comparison.  No discordance is noted.          Selected slides from this case are shared in internal consultation with Mu Dominguez and Donnell who concur.     Given that there is no definitive residual in situ carcinoma present in mastectomy specimen. The specimen is staged from the previous core where the DCIS measured 3 mm maximally. This case is discussed with Dr. Washington on 3/26/21.     [facility]                    Multi cancer panel dated February 24, 2021 for [facility] returned as no known mutations.  A variant of uncertain significance was identified inRECQL c.1031 G>A.          Her outside pathology review by Dr Choi returned as :  Low-grade duct carcinoma in situ, micropapillary type, atypical lobular hyperplasia, sclerosing adenosis, intraductal papilloma.            Procedures:  Ultrasound-guided cyst aspiration 2-12-21  Indication:  symptomatic cyst  Location: RIGHT Breast 6:00 areolar margin  Consent:  The risks, benefits, and alternatives to the procedure were discussed with the patient, who understood and wished to proceed.  The risks described included, but were not limited to, bleeding, infection, pneumothorax, and cyst recurrence requiring percutaneous excisional biopsy.  Description of Procedure:   After the patient was positioned supine on the procedure table, I located the cyst using ultrasound.  I prepped and draped the breast skin in sterile fashion.  I anesthetized  the breast skin with 1% lidocaine with epinephrine.  I then anesthetized the underlying subcutaneous tissue and breast parenchyma surrounding the lesion with 1% lidocaine with epinephrine under ultrasound visualization and guidance. I then inserted a 21 G needle (attached to a syringe) into the cyst under ultrasound guidance and aspirated the cyst fluid until the cyst completely disappeared. The fluid aspirated was typical cyst fluid, nonbloody. 1cc.  The fluid was discarded.  Manual compression was held for 5 minutes and a bandaid applied.  Marker placed: none  Tolerance: The patient tolerated the procedure well.  Disposition: as below    Assessment:   1- Left breast cancer  Left breast 6:00, 1 cm from the nipple, central, 5 mm mass on ultrasound, severe atypical duct hyperplasia bordering on low-grade duct carcinoma in situ, multiple ducts with micropapillary atypical duct hyperplasia at least, recommend excision.  Bowtie marker.  Note that the bowtie marker and the you marker at the site of papilloma are 3.1 cm apart.  TisN0- stage 0  2-20-21-  Her outside pathology review by Dr Choi returned as :  Low-grade duct carcinoma in situ, micropapillary type, atypical lobular hyperplasia, sclerosing adenosis, intraductal papilloma.  Pathology from 3-25-21 LEFT total mastectomy and SLNB with prepectoral expander reconstruction Dr Casper returned as :  Multiple intraductal papillomas, sclerosing adenosis, usual hyperplasia, columnar cell change, apocrine cyst, fibroadenomatoid change, negative for malignancy.  0 of 1 sentinel node.  Pathologic stage Tis N0 stage 0.   Dr Fernando called me to comment on the numerous papillomas in the breast.     Multi cancer panel dated February 24, 2021 for [facility] returned as no known mutations.  A variant of uncertain significance was identified in RECQL c.1031 G>A.    3- right breast biopsy x 2, US core, x 1 stereotactic 2/2022, benign and concordant    4- abnormal imaging right  breast for which surveillance is recommended 2/2022  Multiple masses at 9:00 in the right breast are probably benign. If the above pathology results are benign, short-term follow-up targeted right breast ultrasound would be recommended in 6 months to document six-month stability.    Discussion:  Her imaging and pathology results from 2/2022 were reviewed in detail, copies given.  We discussed her concerns with continued surveillance of the right breast and her desire for risk reducing mastectomy.  I reassured her that I had spoken with Dr Washington who agrees with the plan for her to complete the follow up imaging of the right breast as recommended in 8/2022 and then return to see me for discussion, review.  We will then discuss an appointment with Dr Washington to plan for risk reducing surgery dependent on imaging results.  She and her  are in agreement with this plan.  In the interim, I advised her to work on diet, exercise and stress reduction to allow her to be in her optimal health status for possible surgery.  She agrees with this plan as well.    Plan:  The patient goes by Ernestina.    - right diagnostic mammogram with tomosynthesis, right complete US in 8/2022 at [facility] followed by clinic visit  - continue follow up with Dr Mariano      I asked her to call us in the interim with any concerns would be happy to see her back sooner.      ELISEO Garcia     I spent 45 minutes caring for Ms Oliver on this date of service. This time includes time spent by me in the following activities: preparing for the visit, reviewing tests, obtaining and/or reviewing a separately obtained history, performing a medically appropriate examination and/or evaluation , counseling and educating the patient/family/caregiver, ordering medications, tests, or procedures and documenting information in the medical record.      EMR Dragon/transcription disclaimer:  Dictated using Dragon dictation

## 2022-05-26 ENCOUNTER — APPOINTMENT (OUTPATIENT)
Dept: OTHER | Facility: HOSPITAL | Age: 47
End: 2022-05-26

## 2022-07-14 RX ORDER — CLOTRIMAZOLE AND BETAMETHASONE DIPROPIONATE 10; .64 MG/G; MG/G
CREAM TOPICAL SEE ADMIN INSTRUCTIONS
Qty: 45 EACH | Refills: 11 | Status: SHIPPED | OUTPATIENT
Start: 2022-07-14

## 2022-07-15 ENCOUNTER — IMMUNIZATION (OUTPATIENT)
Dept: FAMILY MEDICINE CLINIC | Facility: CLINIC | Age: 47
End: 2022-07-15

## 2022-07-15 DIAGNOSIS — Z23 IMMUNIZATION DUE: Primary | ICD-10-CM

## 2022-07-15 PROCEDURE — 91305 COVID-19 (PFIZER) 12+ YRS: CPT | Performed by: FAMILY MEDICINE

## 2022-07-15 PROCEDURE — 0054A COVID-19 (PFIZER) 12+ YRS: CPT | Performed by: FAMILY MEDICINE

## 2022-07-20 NOTE — ANESTHESIA PROCEDURE NOTES
Airway  Urgency: elective    Date/Time: 7/16/2021 12:17 PM  Airway not difficult    General Information and Staff    Patient location during procedure: OR  Anesthesiologist: Karthik Oconnor MD  CRNA: Bryan Barrios CRNA    Indications and Patient Condition  Indications for airway management: airway protection    Preoxygenated: yes  MILS maintained throughout  Mask difficulty assessment: 1 - vent by mask    Final Airway Details  Final airway type: endotracheal airway      Successful airway: ETT  Cuffed: yes   Successful intubation technique: direct laryngoscopy  Facilitating devices/methods: intubating stylet  Endotracheal tube insertion site: oral  Blade: [place]  Blade size: 2  ETT size (mm): 7.0  Cormack-Lehane Classification: grade I - full view of glottis  Placement verified by: chest auscultation and capnometry   Cuff volume (mL): 6  Measured from: lips  ETT/EBT  to lips (cm): 21  Number of attempts at approach: 1  Assessment: lips, teeth, and gum same as pre-op and atraumatic intubation              
English

## 2022-08-03 ENCOUNTER — HOSPITAL ENCOUNTER (OUTPATIENT)
Dept: MAMMOGRAPHY | Facility: HOSPITAL | Age: 47
Discharge: HOME | End: 2022-08-03

## 2022-08-03 ENCOUNTER — HOSPITAL ENCOUNTER (OUTPATIENT)
Dept: ULTRASOUND IMAGING | Facility: HOSPITAL | Age: 47
Discharge: HOME | End: 2022-08-03

## 2022-08-03 DIAGNOSIS — R92.8 ABNORMAL FINDING ON BREAST IMAGING: ICD-10-CM

## 2022-08-03 DIAGNOSIS — N60.12 FIBROCYSTIC BREAST CHANGES, BILATERAL: ICD-10-CM

## 2022-08-03 DIAGNOSIS — N60.11 FIBROCYSTIC BREAST CHANGES, BILATERAL: ICD-10-CM

## 2022-08-03 DIAGNOSIS — D05.12 BREAST NEOPLASM, TIS (DCIS), LEFT: ICD-10-CM

## 2022-08-03 PROCEDURE — 76642 ULTRASOUND BREAST LIMITED: CPT

## 2022-08-10 ENCOUNTER — OFFICE VISIT (OUTPATIENT)
Dept: SURGERY | Facility: CLINIC | Age: 47
End: 2022-08-10

## 2022-08-10 VITALS
WEIGHT: 207 LBS | SYSTOLIC BLOOD PRESSURE: 138 MMHG | BODY MASS INDEX: 34.49 KG/M2 | OXYGEN SATURATION: 98 % | HEART RATE: 82 BPM | DIASTOLIC BLOOD PRESSURE: 78 MMHG | HEIGHT: 65 IN

## 2022-08-10 DIAGNOSIS — R92.8 ABNORMAL FINDING ON BREAST IMAGING: ICD-10-CM

## 2022-08-10 DIAGNOSIS — N60.11 FIBROCYSTIC BREAST CHANGES, BILATERAL: ICD-10-CM

## 2022-08-10 DIAGNOSIS — D05.12 BREAST NEOPLASM, TIS (DCIS), LEFT: Primary | ICD-10-CM

## 2022-08-10 DIAGNOSIS — N60.12 FIBROCYSTIC BREAST CHANGES, BILATERAL: ICD-10-CM

## 2022-08-10 PROCEDURE — 99214 OFFICE O/P EST MOD 30 MIN: CPT | Performed by: NURSE PRACTITIONER

## 2022-08-10 NOTE — PROGRESS NOTES
Chief Complaint: Ernestina Osuna is a 47 y.o. female who was seen in consultation at the request of No ref. provider found  for breast cancer, ADH and biopsy follow up    History of Present Illness:  2021:  Seen by Dr Washington  Patient presents with ADH/DCIS and abnormal imaging and breast mass.     She had a regular screening mammogram in November.  After her mammogram she noticed a single episode of nipple drainage from her left nipple when she reached down to scratch her left breast.  She only saw this 1 time and says that it was clear.  She then sought imaging for this as below.  After having her initial set of images, she noticed a fairly rapid onset burning tenderness to the right nipple area and behind the nipple.  For this we also did diagnostic imaging below.  On the left severe ADH bordering on duct carcinoma in situ was identified at 6:00 and a papilloma identified on MRI at 9:00 to account for her nipple drainage.  On the right at the site of the mass there was a 2 cm simple cyst.  As below.      She noted no new masses, skin changes,other  nipple changes prior to her most recent imaging.  Her most recent imaging includes the followin2020 BILATERAL SCREENING MAMMO WITH PAULA  [facility]        ERNESTINA OSUNA  Heterogeneously dense.  BI-RADS Category 2: Benign.    12/15/2020 LEFT DIAGNOSTIC MMG WITH PAULA & LEFT BREAST US        [facility]       ERNESTINA OSUNA  Single episode of spontaneous clear left nipple discharge. She states her left breast feels like it is on fire.  MMG:  Heterogeneously dense. There are multiple masses and/or ducts in the retroareolar left breast, which are located within the densest area of breast parenchyma.  US:  Left ultrasound, 6:00, 1 cm from the nipple, 0.5 x 0.4 x 0.5 cm complicated cyst versus solid mass, may be contiguous with a duct. Otherwise, numerous benign-appearing cysts and ducts are identified.  Complicated cyst versus solid mass at 6:00 in the left  breast, evaluation with ultrasound-guided core needle biopsy is recommended.  BI-RADS Category 4: Suspicious.    She had a biopsy on the following day that showed:   01/05/2021 LEFT DIAGNOSTIC MAMMO & US GUIDED BREAST BIOPSY      TOYA OSUNA  6:00, 1 cm from the nipple in the left breast, 11 g needle. 11 core specimens were obtained. A bowtie clip was then deployed. Bowtie clip at the expected location in the lower central anterior left breast.  Pathology demonstrates severe atypical ductal hyperplasia bordering on low-grade DCIS, which is concordant.  1. 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  Sections demonstrate and area of possible cyst wall/dilated duct with associated apocrine cysts and proliferative breast changes. Multiple ducts demonstrate areas of micropapillary atypia, which by IHC qualify as at least ADH. Definitive diagnosis is best deferred to the excision.    I then arranged for a MRI:    01/25/2021 BILATERAL BREAST MRI         [facility]           ALFREDO OSUNA  Subareolar right breast, 2.1 cm cyst, which corresponds to the palpable lump.  LEFT BREAST:   6:00, 3.6 cm posterior to the nipple, susceptibility from a biopsy clip, which marks the site of biopsy-proven atypia. No suspicious mass enhancement at this location.   9:00, 3.9 cm posterior to the nipple, there is a 1.2 cm focal area of clumped nonmass enhancement.  IMPRESSION AND RECOMMENDATION:  9:00, left breast. Further evaluation with MRI guided core needle biopsy is recommended.  BI-RADS Category 4: Suspicious.    01/25/2021 RIGHT DIAGNOSTIC MMG WITH PAULA & RIGHT BREAST US    [facility]     ALFREDO OSUNA  CLINICAL INDICATION: Right breast pain and hardening around the right nipple.  MMG:  Heterogeneously dense. There are no suspicious masses, calcifications, or areas of architectural distortion.  US:  Retroareolar right  breast, area of concern, 6:00 there is a palpable 2.0 benign-appearing complicated cyst.  IMPRESSION:  BI-RADS Category 2: Benign.    I then arranged for a MRI guided biopsy:  02/10/2021 MRI BREAST BIOPSY           [facility]   ALFREDO OSUNA  An 8 gauge Mammotome. 14 core samples were obtained. A U-shaped clip.   Post-procedural mammographic views of the left breast demonstrate the U-shaped clip at the expected location in the inner central middle to anterior left breast, approximately 3.1 cm medial to the bowtie-shaped biopsy clip at the site of biopsy-proven atypia bordering on DCIS.  Pathology is high risk and concordant with the imaging assessment.  PATHOLOGY:  1. Left Breast, 9:00 o’clock, MRI-Guided Biopsies:  A. Proliferative breast parenchyma with sclerotic intraductal papillomas with florid duct hyperplasia, columnar cell hyperplasia and apocrine cysts.  B. Focal microcalcifications associated with columnar cell hyperplasia.  C. No atypical hyperplasia, in-situ nor invasive carcinoma identified.    She had not had a breast biopsy in the past.  She has had her uterus and ovaries removed, is postmenopausal, and has taken combination HRT since 2018  Her family history includes the following: her family is abroad and she does not know her FH.    3/4/2021:  Seen by Dr Washington  In the interim, we received the following results:  Multi cancer panel dated February 24, 2021 for [facility] returned as no known mutations.  A variant of uncertain significance was identified in RECQL c.1031 G>A.     Her outside pathology review by Dr Choi returned as :  Low-grade duct carcinoma in situ, micropapillary type, atypical lobular hyperplasia, sclerosing adenosis, intraductal papilloma.     She reports persistent bruising at the LEFT LIQ biopsy site.  She is here to discuss the above and treatment.    4/9/2021:  Seen by Dr Washington  Pathology from 3-25-21 LEFT total mastectomy and SLNB with expander reconstruction Dr Casper  returned as :  Multiple intraductal papillomas, sclerosing adenosis, usual hyperplasia, columnar cell change, apocrine cyst, fibroadenomatoid change, negative for malignancy.  0 of 1 sentinel node.  Pathologic stage Tis N0 stage 0.     Dr Fernando called me to comment on the numerous papillomas in the breast.     She will not need radiatio    She denies any redness warmth or drainage from her incision.  She is having only mild discomfort.     5/11/2022:  She returns today for follow up with improved left arm discomfort with massage, some arthralgias r/t arimidex.  She still has residual biopsy discomfort, burning and itching of right nipple. She has decided that she wants to have a right total mastectomy as soon as possible.  Her  accompanies her today.    In 7/2021 she had TE/implant exchange left breast, right breast mastopexy with Dr Casper    Her last clinic visit with Dr Mariano was 4/15/22:  She remains compliant with anastrozole.  She is complaining of left upper extremity pain going up all the way to her neck.  Her mother had coronary artery disease and required a CABG at the age of 62 and patient is worried that the symptoms are related to cardiac disease.    In 1/2022 she noticed a right breast mass.  Right diagnostic mammogram and US were completed.  2/2/22: right diagnostic mammogram with tomosynthesis, right limited US at [facility] were BiRads 4 masses x 2 for which biopsy recommended. Multiple masses right breast at 0900 for which surveillance recommended if benign pathology.  (see full report below)  2/22/22: right US core biopsy x 2, right stereotactic biopsy x1, all benign and concordant with follow up in 6 months with right diagnostic mammogram/US.  At time of biopsy, patient reported a new palpable mass at 0600 with imaging findings c/w benign surgical changes.    8/10/22, Interval History:  She returns today for follow up with continued left arm joint pain she has had since the beginning of the  year, she feels the pain is r/t anastrozole but does not want to stop anastrozole because she is fearful of recurrent disease.  She and her  who accompanies her today, feel very strongly about risk reducing right breast mastectomy.  She and her  relate that her concern and constant worry about right breast cancer is affecting her life on a daily basis.  She feels depressed and has a difficult time concentrating due to her worry about breast cancer.  She feels that mastectomy while it is not 100% preventative, does reduce her risk and allows her to not take anastozole which also interferes with her everyday life.    She was last seen by Dr Mariano in 4/2022:We discussed holding anastrozole for a week to 2 however she is reluctant to do so as she is concerned about the risk of breast cancer in the right breast.  If no improvement in the left upper extremity pain then we will consider holding anastrozole at that time.    Right breast limited US on 8/3/22 was stable, BiRads 2.  (see full report below)      Review of Systems:  Review of Systems   Constitutional: Positive for unexpected weight change (2 LB WT GAIN ).   Cardiovascular: Palpitations: PALPITATIONS AT NIGHT , BUT NOT OFTEN, CLEARED BY CARDIOLOGY IN [place] AFTER ECHO AND HOLTER MONITOR.   Musculoskeletal: Arthralgias: LEFT KNEE PAIN    All other systems reviewed and are negative.       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 2/10/21 BENIGN.    Breast Cancer HIstory:  Patient does not have a past medical history of breast cancer.  Breast Operations, and year:  NONE   Obstetric/Gynecologic History:  Age menstrual periods began: 12  Patient is postmenopausal due to removal of her uterus in the following year: 43   Number of pregnancies:0  Number of live births: 0  Number of  abortions or miscarriages: 0  Age of delivery of first child: N/A   BREAST FEEDING: N/A   Length of time taking birth control pills: 2 YRS   Patient is presently taking the following hormone replacement: ESTROGEL AND ORAL PROGESTERONE. STARTED 10/2018 TO PRESENT.     PATIENT HAD UTERUS AND BOTH OVARIES REMOVED.     Past Surgical History:   Procedure Laterality Date   • AUGMENTATION MAMMAPLASTY     • BILATERAL OOPHORECTOMY     • BREAST BIOPSY Left 01/05/2021   • BREAST RECONSTRUCTION Left 3/25/2021    Procedure: LEFT PLACEMENT TISSUE EXPANDER AND ALLODERM;  Surgeon: Clifford Casper MD;  Location: [facility];  Service: Plastics;  Laterality: Left;   • BREAST TISSUE EXPANDER REMOVAL INSERTION OF IMPLANT Left 7/16/2021    Procedure: LEFT REMOVAL TISSUE EXPANDER AND PLACEMENT OF IMPLANT;  Surgeon: Clifford Casper MD;  Location: [facility];  Service: Plastics;  Laterality: Left;   • CHOLECYSTECTOMY     • CYSTOSCOPY     • FAT GRAFTING Left 7/16/2021    Procedure: FAT GRAFTING;  Surgeon: Clifford Casper MD;  Location: [facility];  Service: Plastics;  Laterality: Left;   • HYSTERECTOMY     • MASTECTOMY Left    • MASTECTOMY W/ SENTINEL NODE BIOPSY Left 3/25/2021    Procedure: LEFT mastectomy, LEFT axilla sentinel node biopsy,  with or without reconstruction as her surgical treatment.;  Surgeon: Zenia Washington MD;  Location: [facility];  Service: General;  Laterality: Left;   • MASTOPEXY Right 7/16/2021    Procedure: RIGHT MASTOPEXY FOR SYMMETRY;  Surgeon: Clifford Casper MD;  Location: [facility];  Service: Plastics;  Laterality: Right;   • MRI BREAST BIOPSY UNILATERAL Left 02/10/2021   • OOPHORECTOMY     • SUBTOTAL HYSTERECTOMY  2014, 2015, 2018    Partial in 3 surgeries, but still have cervix       Past Medical History:   Diagnosis Date   • Acid reflux    • Allergic 09/2000    Seasonal allergies   • Breast cancer (HCC)    • Breast cyst    • Cholelithiasis 11/2010     "Gallbladet removed in November 2010   • DCIS (ductal carcinoma in situ)     LEFT BREAST   • Drug therapy    • Elevated cholesterol    • Fibrocystic breast    • History of hematuria     MICROSCOPIC   • History of kidney stones    • History of migraine    • Kidney stone 03/2020    [facility]   • Knee pain, left    • Obesity 05/2013    I have been a little overweight since 2013   • Rosacea    • Seasonal allergies        Prior Hospitalizations, other than for surgery or childbirth, and year:  NONE     Social History     Socioeconomic History   • Marital status:    Tobacco Use   • Smoking status: Never Smoker   • Smokeless tobacco: Never Used   Vaping Use   • Vaping Use: Never used   Substance and Sexual Activity   • Alcohol use: Never   • Drug use: Never   • Sexual activity: Not Currently     Partners: Male     Birth control/protection: Abstinence, Surgical     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    Family History:  Family History   Problem Relation Age of Onset   • Heart disease Mother         Triple CABG 03/07/2017   • Hypertension Mother    • Hyperlipidemia Mother    • Hyperlipidemia Father    • Hypertension Father    • Malig Hyperthermia Neg Hx        Vital Signs:  /78 (BP Location: Left arm, Patient Position: Sitting, Cuff Size: Adult)   Pulse 82   Ht 165.1 cm (65\")   Wt 93.9 kg (207 lb)   LMP  (LMP Unknown)   SpO2 98%   BMI 34.45 kg/m²      Medications:    Current Outpatient Medications:   •  anastrozole (ARIMIDEX) 1 MG tablet, TAKE 1 TABLET BY MOUTH EVERY DAY, Disp: 90 tablet, Rfl: 3  •  calcium carb-cholecalciferol 600-800 MG-UNIT tablet, Take  by mouth Daily., Disp: , Rfl:   •  clotrimazole-betamethasone (LOTRISONE) 1-0.05 % cream, Apply  topically to the appropriate area as directed See Admin Instructions. apply topically to the appropriate area as directed 2 times a day, Disp: 45 each, Rfl: 11  •  " "sennosides-docusate (PERICOLACE) 8.6-50 MG per tablet, Take 2 tablets by mouth Daily As Needed for Constipation., Disp: 30 tablet, Rfl: 1     Allergies:  No Known Allergies    Physical Examination:  /78 (BP Location: Left arm, Patient Position: Sitting, Cuff Size: Adult)   Pulse 82   Ht 165.1 cm (65\")   Wt 93.9 kg (207 lb)   LMP  (LMP Unknown)   SpO2 98%   BMI 34.45 kg/m²   General Appearance:  Patient is in no distress.  She is well kept and has an overweight build.   Psychiatric:  Patient with appropriate mood and affect. Alert and oriented to self, time, and place.    Breast, RIGHT:  medium sized, 38B, asymmetric with the contralateral reconstructed side. Well healed mastopexy surgical incisions and biopsy sites x 3 lateral aspect.  Breast skin is without erythema, edema, rashes.  There are no visible abnormalities upon inspection during the arm-raising maneuver or with hands on hips in the sitting position. There is no nipple retraction, discharge or nipple/areolar skin changes.There are no masses palpable in the sitting or supine positions.    Breast, LEFT:  Surgically absent with well-healed surgical incisions and implant in place.  No skin nodules or discolorations.    Lymphatic:  There is no axillary, cervical, infraclavicular, or supraclavicular adenopathy bilaterally.  Musculoskeletal:  Good strength in all 4 extremities.   There is good range of motion in both shoulders.    Skin:  No new skin lesions or rashes on the skin excluding the breast (see breast exam above).        Imagin2021 RIGHT DIAGNOSTIC MMG WITH PAULA & RIGHT BREAST US    TOYA OSUNA  CLINICAL INDICATION: Right breast pain and hardening around the right nipple.  MMG:  Heterogeneously dense. There are no suspicious masses, calcifications, or areas of architectural distortion.  US:  Retroareolar right breast, area of concern, 6:00 there is a palpable 2.0 benign-appearing complicated " cyst.  IMPRESSION:  BI-RADS Category 2: Benign.    2/2/2022:  Right diagnostic mammogram, right limited US at [facility]  CLINICAL INDICATION: 46 years old woman presents for diagnostic evaluation of a palpable right breast lump for one week.    COMPARISON: Prior examinations dating back to 10/11/2019   FINDINGS:  MAMMOGRAM: Right CC and MLO standard and spot compression 2-D and Tomosynthesis views were obtained.     The breasts are heterogeneously dense, which may obscure small masses.    There is a marker overlying the outer central anterior right breast marking the area of concern indicated by the patient. Deep to the marker, there is a persistent mild/questioned area of architectural distortion. This is located somewhat near a linear surgical scar marker, however, the mammographic appearance is more conspicuous. This area was further assessed with ultrasound.  There are benign-appearing breast masses, which are not significantly changed. There are benign-appearing calcifications.    ULTRASOUND:  Targeted sonographic evaluation of the right breast was performed from 8:00 to 11:00 in the area of concern indicated by the patient and in the region of the mammographic abnormality.  At 8:00, 6 cm from the nipple, there is a palpable 0.7 x 0.5 x 0.5 cm oval hypoechoic complicated cyst versus solid mass, which is suspicious.  At 9:00 in the retroareolar right breast, there is a 0.9 x 0.9 x 0.9 cm complicated cyst, which is probably benign.  At 9:00, 2 cm from the nipple, there are two 0.4 x 0.4 x 0.4 cm complicated cysts versus solid masses, which are probably benign.  At 9:00, 3 cm from the nipple, there is a 0.6 x 0.5 x 0.4 cm located cyst versus solid mass, which is probably benign.  At 9:00 to 4 cm from the nipple, there is a 0.6 x 0.7 x 0.7 cm complicated cyst versus solid mass, which is probably benign.  At 8:30, 7 cm from the nipple, there is a 1.9 x 0.7 x 1.3 cm hypoechoic mass with indistinct margins, which is  suspicious.  Additional small benign-appearing cysts are also present.  No definite sonographic correlate is identified for the questioned area of distortion in the right breast.   IMPRESSION:  1.  Suspicious questioned/mild architectural distortion in the anterior right breast. Recommend further evaluation with  stereotactic/Tomosynthesis guided core needle biopsy.  2.  Masses at 8:00 and 8:30 in the right breast are suspicious. Recommend further evaluation with 2 site ultrasound-guided core needle biopsy.  3.  Multiple masses at 9:00 in the right breast are probably benign. If the above pathology results are benign, short-term follow-up targeted right breast ultrasound would be recommended in 6 months to document six-month stability.  4.  Given the patient's history of left breast malignancy and multiple breast masses, annual contrast-enhanced screening breast MRI is recommended, which would be due at this time.   BI-RADS Category 4: Suspicious    2/22/2022: Right US core biopsy x 2, right stereotactic biopsy x 1 at [facility]   IMPRESSION:   1. Stereotactic/Tomosynthesis guided core needle biopsy of questioned/mild architectural distortion in the outer anterior right breast, marked with a bowtie biopsy clip. Pathology is benign and does not explain architectural distortion, however, the distortion on mammogram was questioned/mild with no sonographic correlate, and the area was adequately sampled. As such, this is felt to be concordant.   2. Ultrasound-guided core needle biopsy of 1.9 cm mass at 8:30, 7 cm from the nipple in the right breast, marked with a triple twist clip. Pathology is benign and concordant with the imaging assessment.   3. Ultrasound-guided core needle biopsy of a 0.7 cm mass at 8:00, 6 cm from the nipple in the right breast, marked with a wing clip. Pathology is benign and concordant with the imaging assessment.   4. Short-term follow-up imaging of multiple additional right breast masses is again  recommended. Please refer to the diagnostic imaging report from 2/2/2022 for additional details. Further management of the new patient indicated palpable area in the right breast should be based on clinical assessment.    8/3/2022: right breast limited US at [facility]   FINDINGS: Targeted sonographic evaluation of the right breast was performed from the 8-o'clock through 9-o'clock positions. Comparison is made to prior right breast sonography dated 02/02/2022 and 02/22/2022.     At the 8:30 position on the order 7 cm from the nipple at a site of a prior benign biopsy, there is a 1.3 cm hypoechoic region without detectable vascularity with mild posterior acoustic enhancement that is consistent with postbiopsy change.   At the 8-o'clock position on the order of 6 cm from the nipple, there is a 0.8 cm hypoechoic region that corresponds to a site of a prior benign biopsy that is consistent with postbiopsy change.  In the retroareolar region at the 9-o'clock position, there is a 1.1 cm benign simple cyst.   At the 9-o'clock position on the order 2 cm from the nipple, there are 2 adjacent cysts that measure on the order of 0.4 cm which is unchanged. Both cysts have a benign appearance consistent with benign complicated cyst.   At the 9-o'clock position on the order of 3 cm from the nipple, there is a 0.4 cm cyst that previously measured 0.6 cm in greatest dimension. This is consistent with a benign cyst.   At the 9-o'clock position on the order of 4 cm the nipple, there is a 0.7 cm round circumscribed hypoechoic mass that previously measured 0.7 cm and is consistent with a stable benign complicated cyst.     IMPRESSION:  There are stable benign-appearing cysts in the right breast as described. Benign postbiopsy change in the right breast is also noted. Routine follow-up mammography is recommended.   BI-RADS Category 2: Benign finding.    Pathology:  01/05/2021 LEFT DIAGNOSTIC MAMMO & US GUIDED BREAST BIOPSY      [facility]       ALFREDO OSUNA  6:00, 1 cm from the nipple in the left breast, 11 g needle. 11 core specimens were obtained. A bowtie clip was then deployed. Bowtie clip at the expected location in the lower central anterior left breast.  Pathology demonstrates severe atypical ductal hyperplasia bordering on low-grade DCIS, which is concordant.  1. 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  Sections demonstrate and area of possible cyst wall/dilated duct with associated apocrine cysts and proliferative breast changes. Multiple ducts demonstrate areas of micropapillary atypia, which by IHC qualify as at least ADH. Definitive diagnosis is best deferred to the excision.    02/10/2021 MRI BREAST BIOPSY           [facility]   ALFREDO ENRRIQUE  An 8 gauge Mammotome. 14 core samples were obtained. A U-shaped clip.   Post-procedural mammographic views of the left breast demonstrate the U-shaped clip at the expected location in the inner central middle to anterior left breast, approximately 3.1 cm medial to the bowtie-shaped biopsy clip at the site of biopsy-proven atypia bordering on DCIS.  Pathology is high risk and concordant with the imaging assessment.  PATHOLOGY:  1. Left Breast, 9:00 o’clock, MRI-Guided Biopsies:  A. Proliferative breast parenchyma with sclerotic intraductal papillomas with florid duct hyperplasia, columnar cell hyperplasia and apocrine cysts.  B. Focal microcalcifications associated with columnar cell hyperplasia.  C. No atypical hyperplasia, in-situ nor invasive carcinoma identified.      Pathology from 3-25-21 LEFT total mastectomy and SLNB with expander reconstruction Dr Casper returned as :  Multiple intraductal papillomas, sclerosing adenosis, usual hyperplasia, columnar cell change, apocrine cyst, fibroadenomatoid change, negative for malignancy.  0 of 1 sentinel node.  Pathologic stage Tis N0 stage  0.     Dr Fernando called me to comment on the numerous papillomas in the breast.     She will not need radiation. I will call and let her know.     Will arrange for her to see medical oncology .          Final Diagnosis    1. Breast, Left, Mastectomy (912.6 grams):               A. Multiple intraductal papillomas, sclerosing adenosis, florid ductal hyperplasia of the usual type, columnar cell         change, clustered apocrine cysts, fibroadenomatoid change and scattered benign microcalcifications.  B. Negative for atypia, in situ and infiltrating carcinoma.     2. Lymph Node [place] #1, Excision:               A. Reactive lymph node (0/1).     3. Skin, Left Breast, Excision:               A. Benign skin and subcutaneous tissue.      [facility]     Electronically signed by Magdalena Fernando MD on 3/26/2021 at 1541    Synoptic Checklist    DCIS OF THE BREAST: Resection  8th Edition - Protocol posted: 2/26/2020  DCIS OF THE BREAST: COMPLETE EXCISION - All Specimens       SPECIMEN   Procedure   Total mastectomy    Specimen Laterality   Left    TUMOR   Tumor Site   Clock position        6 o'clock    Histologic Type   Ductal carcinoma in situ    Size (Extent) of DCIS   Estimated size (extent) of DCIS is at least (Millimeters): 3 (on core biopsy [id number]) mm   Architectural Patterns   Micropapillary    Nuclear Grade   Grade I (low)    Necrosis   Not identified    Microcalcifications   Present in nonneoplastic tissue    MARGINS   Margins   Uninvolved by DCIS    Distance from Closest Margin (Millimeters)   15 (measured from bowtie clip) mm   Closest Margin(s)   Anterior    LYMPH NODES   Regional Lymph Nodes   Uninvolved by tumor cells    Total Number of Lymph Nodes Examined   1    Number of [place] Nodes Examined   1    PATHOLOGIC STAGE CLASSIFICATION (pTNM, AJCC 8th Edition)       Primary Tumor (pT)   pTis (DCIS)    Regional Lymph Nodes Modifier   (sn): [place] node(s) evaluated    Regional Lymph Nodes (pN)   pN0    SPECIAL  STUDIES   Breast Biomarker Testing Performed on Previous Biopsy       Estrogen Receptor (ER) Status   Positive    Percentage of Cells with Nuclear Positivity   %    Breast Biomarker Testing Performed on Previous Biopsy       Progesterone Receptor (PgR) Status   Positive    Percentage of Cells with Nuclear Positivity   51-60%    Testing Performed on Case Number   [id number]    .      Comment      The patient's concurrent left breast core biopsy material is on file with our department ([id number]); slides are pulled and reviewed for comparison.  No discordance is noted.          Selected slides from this case are shared in internal consultation with Mu Dominguez and Donnell who concur.     Given that there is no definitive residual in situ carcinoma present in mastectomy specimen. The specimen is staged from the previous core where the DCIS measured 3 mm maximally. This case is discussed with Dr. Washington on 3/26/21.     [facility]        Multi cancer panel dated February 24, 2021 for [facility] returned as no known mutations.  A variant of uncertain significance was identified inRECQL c.1031 G>A.     Her outside pathology review by Dr Choi returned as :  Low-grade duct carcinoma in situ, micropapillary type, atypical lobular hyperplasia, sclerosing adenosis, intraductal papilloma.       Procedures:  Ultrasound-guided cyst aspiration 2-12-21  Indication:  symptomatic cyst  Location: RIGHT Breast 6:00 areolar margin  Consent:  The risks, benefits, and alternatives to the procedure were discussed with the patient, who understood and wished to proceed.  The risks described included, but were not limited to, bleeding, infection, pneumothorax, and cyst recurrence requiring percutaneous excisional biopsy.  Description of Procedure:   After the patient was positioned supine on the procedure table, I located the cyst using ultrasound.  I prepped and draped the breast skin in sterile fashion.  I anesthetized the breast skin with  1% lidocaine with epinephrine.  I then anesthetized the underlying subcutaneous tissue and breast parenchyma surrounding the lesion with 1% lidocaine with epinephrine under ultrasound visualization and guidance. I then inserted a 21 G needle (attached to a syringe) into the cyst under ultrasound guidance and aspirated the cyst fluid until the cyst completely disappeared. The fluid aspirated was typical cyst fluid, nonbloody. 1cc.  The fluid was discarded.  Manual compression was held for 5 minutes and a bandaid applied.  Marker placed: none  Tolerance: The patient tolerated the procedure well.  Disposition: as below    Assessment:   1- Left breast cancer  Left breast 6:00, 1 cm from the nipple, central, 5 mm mass on ultrasound, severe atypical duct hyperplasia bordering on low-grade duct carcinoma in situ, multiple ducts with micropapillary atypical duct hyperplasia at least, recommend excision.  Bowtie marker.  Note that the bowtie marker and the you marker at the site of papilloma are 3.1 cm apart.  TisN0- stage 0  2-20-21-  Her outside pathology review by Dr Choi returned as :  Low-grade duct carcinoma in situ, micropapillary type, atypical lobular hyperplasia, sclerosing adenosis, intraductal papilloma.  Pathology from 3-25-21 LEFT total mastectomy and SLNB with prepectoral expander reconstruction Dr Casper returned as :  Multiple intraductal papillomas, sclerosing adenosis, usual hyperplasia, columnar cell change, apocrine cyst, fibroadenomatoid change, negative for malignancy.  0 of 1 sentinel node.  Pathologic stage Tis N0 stage 0.   Dr Fernando called me to comment on the numerous papillomas in the breast.     Multi cancer panel dated February 24, 2021 for [facility] returned as no known mutations.  A variant of uncertain significance was identified in RECQL c.1031 G>A.    3- right breast biopsy x 2, US core, x 1 stereotactic 2/2022, benign and concordant    4- abnormal imaging right breast for which  surveillance is recommended 2/2022, stable 8/3/2022  Multiple masses at 9:00 in the right breast are probably benign. If the above pathology results are benign, short-term follow-up targeted right breast ultrasound would be recommended in 6 months to document six-month stability.    Discussion:  Her imaging from 8/2022 were reviewed in detail, results are stable, surveillance of right breast cysts is no longer recommended.     I had a long discussion with both Ms Urrutia and her  regarding her concerns with continued surveillance of the right breast and her desire for risk reducing mastectomy.  I reassured her that I will review her case with Dr Washington.  I will call her in the near future after talking to Dr Washington for follow up planning.  She is aware that mastectomy does not reduce her risk for breast cancer completely but feels the significant reduction will improve her general health.    She will be due her right screening mammogram in 6 months.     In the interim, I advised her to work on diet, exercise and stress reduction to allow her to be in her optimal health status.  She agrees with this plan as well.    Plan:  The patient goes by Ernestina.    -  Right screening mammogram with tomosynthesis, in 6 months at [facility] followed by clinic visit  - continue follow up with Dr Mariano    I asked her to call us in the interim with any concerns would be happy to see her back sooner.      ELISEO Garcia/transcription disclaimer:  Dictated using Dragon dictation

## 2022-08-15 ENCOUNTER — TELEPHONE (OUTPATIENT)
Dept: SURGERY | Facility: CLINIC | Age: 47
End: 2022-08-15

## 2022-08-15 NOTE — TELEPHONE ENCOUNTER
Patient would like to discuss RRM. I have her scheduled to come in to meet with Dr. Washington Friday 9/9/22 10:00 am

## 2022-08-15 NOTE — TELEPHONE ENCOUNTER
July 16, 2021 Dr. Casper left tissue expander exchange for implant and fat grafting with my right mastopexy for symmetry.    Pathology returned as benign unremarkable skin and breast tissue.  February 2, 2022 right diagnostic mammogram with tomosynthesis and right breast ultrasound for palpable right mass for 1 week.  Deep to the area of palpable concern there is question area of architectural distortion near a scar marker.  The mammographic appearance is more conspicuous.   Recommend anterior right breast stereotactic biopsy of the distortion.      On ultrasound, masses at 8:00 and 830 in the right breast recommend 2 site ultrasound-guided biopsy.    Masses at 9:00 in the right breast are probably benign.  If the above pathology results are benign, recommend short-term follow-up right breast ultrasound in 6 months.  BI-RADS 4    4-22- 2022 [facility]     1-  Stereo biopsy right breast architectural distortion  Stereotactic biopsy 8 gauge x6 cores, but bowtie clip was deployed.  Fibroadenomatoid hyperplasia, simple cyst, calcifications, apocrine metaplasia.  2-Ultrasound-guided biopsy 1.9 cm mass at 830, 7 cm in the nipple right breast  Ultrasound-guided biopsy right breast 830, 7 cm from the nipple 10-gauge, 6 specimens, triple twist clip was placed  Usual duct hyperplasia, apocrine metaplasia, fibroadenomatoid hyperplasia, sclerosing adenosis, concordant    3--Ultrasound-guided biopsy of a 7 mm cystic versus solid mass at 8:00, 6 cm from the nipple right breast  Right breast ultrasound-guided biopsy 8:00, 6 cm from the nipple, 10-gauge x4 cores, wing clip was deployed.  Simple cyst, microcalcifications, focal chronic inflammation, fibroadenomatoid hyperplasia    Patient has been on Arimidex since April 2021.  With Dr. Mariano.      Right breast ultrasound August 3, 2022:  Stable appearing benign cyst in the right breast.  Postbiopsy change in the right breast.  BI-RADS 2 routine follow-up  mammography.

## 2022-08-17 ENCOUNTER — PREP FOR SURGERY (OUTPATIENT)
Dept: OTHER | Facility: HOSPITAL | Age: 47
End: 2022-08-17

## 2022-08-17 ENCOUNTER — TELEPHONE (OUTPATIENT)
Dept: SURGERY | Facility: CLINIC | Age: 47
End: 2022-08-17

## 2022-08-17 ENCOUNTER — OFFICE VISIT (OUTPATIENT)
Dept: SURGERY | Facility: CLINIC | Age: 47
End: 2022-08-17

## 2022-08-17 VITALS
SYSTOLIC BLOOD PRESSURE: 106 MMHG | DIASTOLIC BLOOD PRESSURE: 68 MMHG | HEART RATE: 60 BPM | WEIGHT: 210 LBS | OXYGEN SATURATION: 99 % | HEIGHT: 65 IN | BODY MASS INDEX: 34.99 KG/M2

## 2022-08-17 DIAGNOSIS — D05.12 BREAST NEOPLASM, TIS (DCIS), LEFT: ICD-10-CM

## 2022-08-17 DIAGNOSIS — D24.2 PAPILLOMA OF LEFT BREAST: ICD-10-CM

## 2022-08-17 DIAGNOSIS — Z78.9 FAMILY HISTORY UNKNOWN: ICD-10-CM

## 2022-08-17 DIAGNOSIS — N60.11 FIBROCYSTIC DISEASE OF RIGHT BREAST: Primary | ICD-10-CM

## 2022-08-17 DIAGNOSIS — Z92.29 HISTORY OF HORMONE REPLACEMENT THERAPY: ICD-10-CM

## 2022-08-17 DIAGNOSIS — D05.12 BREAST NEOPLASM, TIS (DCIS), LEFT: Primary | ICD-10-CM

## 2022-08-17 DIAGNOSIS — R92.8 ABNORMAL MAMMOGRAM: ICD-10-CM

## 2022-08-17 DIAGNOSIS — R92.8 ABNORMAL ULTRASOUND OF BREAST: ICD-10-CM

## 2022-08-17 PROCEDURE — 99214 OFFICE O/P EST MOD 30 MIN: CPT | Performed by: SURGERY

## 2022-08-17 RX ORDER — SODIUM CHLORIDE, SODIUM LACTATE, POTASSIUM CHLORIDE, CALCIUM CHLORIDE 600; 310; 30; 20 MG/100ML; MG/100ML; MG/100ML; MG/100ML
100 INJECTION, SOLUTION INTRAVENOUS CONTINUOUS
Status: CANCELLED | OUTPATIENT
Start: 2022-09-22

## 2022-08-17 RX ORDER — CEFAZOLIN SODIUM 2 G/100ML
2 INJECTION, SOLUTION INTRAVENOUS ONCE
Status: CANCELLED | OUTPATIENT
Start: 2022-09-22 | End: 2022-08-17

## 2022-08-17 NOTE — PROGRESS NOTES
Chief Complaint: Ernestina Osuna is a 47 y.o. female who was seen in consultation at the request of ELISEO Chong  for ADH, abnormal breast imaging, newly diagnosed DCIS and a followup visit    History of Present Illness:  Patient presents with ADH/DCIS and abnormal imaging and breast mass.     She had a regular screening mammogram in November.  After her mammogram she noticed a single episode of nipple drainage from her left nipple when she reached down to scratch her left breast.  She only saw this 1 time and says that it was clear.  She then sought imaging for this as below.  After having her initial set of images, she noticed a fairly rapid onset burning tenderness to the right nipple area and behind the nipple.  For this we also did diagnostic imaging below.  On the left severe ADH bordering on duct carcinoma in situ was identified at 6:00 and a papilloma identified on MRI at 9:00 to account for her nipple drainage.  On the right at the site of the mass there was a 2 cm simple cyst.  As below.      She noted no new masses, skin changes,other  nipple changes prior to her most recent imaging.  Her most recent imaging includes the followin2020 BILATERAL SCREENING MAMMO WITH PAULA  [facility]        ERNESTINA OSUNA  Heterogeneously dense.  BI-RADS Category 2: Benign.    12/15/2020 LEFT DIAGNOSTIC MMG WITH PAULA & LEFT BREAST US        [facility]       ERNESTINA OSUNA  Single episode of spontaneous clear left nipple discharge. She states her left breast feels like it is on fire.  MMG:  Heterogeneously dense. There are multiple masses and/or ducts in the retroareolar left breast, which are located within the densest area of breast parenchyma.  US:  Left ultrasound, 6:00, 1 cm from the nipple, 0.5 x 0.4 x 0.5 cm complicated cyst versus solid mass, may be contiguous with a duct. Otherwise, numerous benign-appearing cysts and ducts are identified.  Complicated cyst versus solid mass at 6:00 in the left  breast, evaluation with ultrasound-guided core needle biopsy is recommended.  BI-RADS Category 4: Suspicious.      She had a biopsy on the following day that showed:   01/05/2021 LEFT DIAGNOSTIC MAMMO & US GUIDED BREAST BIOPSY      TOYA OSUNA  6:00, 1 cm from the nipple in the left breast, 11 g needle. 11 core specimens were obtained. A bowtie clip was then deployed. Bowtie clip at the expected location in the lower central anterior left breast.  Pathology demonstrates severe atypical ductal hyperplasia bordering on low-grade DCIS, which is concordant.  1. 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  Sections demonstrate and area of possible cyst wall/dilated duct with associated apocrine cysts and proliferative breast changes. Multiple ducts demonstrate areas of micropapillary atypia, which by IHC qualify as at least ADH. Definitive diagnosis is best deferred to the excision.    I then arranged for a MRI:    01/25/2021 BILATERAL BREAST MRI         [facility]           ALFREDO OSUNA  Subareolar right breast, 2.1 cm cyst, which corresponds to the palpable lump.  LEFT BREAST:   6:00, 3.6 cm posterior to the nipple, susceptibility from a biopsy clip, which marks the site of biopsy-proven atypia. No suspicious mass enhancement at this location.   9:00, 3.9 cm posterior to the nipple, there is a 1.2 cm focal area of clumped nonmass enhancement.  IMPRESSION AND RECOMMENDATION:  9:00, left breast. Further evaluation with MRI guided core needle biopsy is recommended.  BI-RADS Category 4: Suspicious.    01/25/2021 RIGHT DIAGNOSTIC MMG WITH PAULA & RIGHT BREAST US    [facility]     ALFREDO OSUNA  CLINICAL INDICATION: Right breast pain and hardening around the right nipple.  MMG:  Heterogeneously dense. There are no suspicious masses, calcifications, or areas of architectural distortion.  US:  Retroareolar right  breast, area of concern, 6:00 there is a palpable 2.0 benign-appearing complicated cyst.  IMPRESSION:  BI-RADS Category 2: Benign.    I then arranged for a MRI guided biopsy:  02/10/2021 MRI BREAST BIOPSY           [facility]   ALFREDO SHOREAS  An 8 gauge Mammotome. 14 core samples were obtained. A U-shaped clip.   Post-procedural mammographic views of the left breast demonstrate the U-shaped clip at the expected location in the inner central middle to anterior left breast, approximately 3.1 cm medial to the bowtie-shaped biopsy clip at the site of biopsy-proven atypia bordering on DCIS.  Pathology is high risk and concordant with the imaging assessment.  PATHOLOGY:  1. Left Breast, 9:00 o’clock, MRI-Guided Biopsies:  A. Proliferative breast parenchyma with sclerotic intraductal papillomas with florid duct hyperplasia, columnar cell hyperplasia and apocrine cysts.  B. Focal microcalcifications associated with columnar cell hyperplasia.  C. No atypical hyperplasia, in-situ nor invasive carcinoma identified.        She had not had a breast biopsy in the past.  She has had her uterus and ovaries removed, is postmenopausal, and has taken combination HRT since 2018  Her family history includes the following: her family is abroad and she does not know her FH.      In the interim, we received the following results:  Multi cancer panel dated February 24, 2021 for [facility] returned as no known mutations.  A variant of uncertain significance was identified in RECQL c.1031 G>A.       Her outside pathology review by Dr Choi returned as :  Low-grade duct carcinoma in situ, micropapillary type, atypical lobular hyperplasia, sclerosing adenosis, intraductal papilloma.         She reports persistent bruising at the LEFT LIQ biopsy site.  She is here to discuss the above and treatment.      Pathology from 3-25-21 LEFT total mastectomy and SLNB with expander reconstruction Dr Casper returned as :  Multiple intraductal papillomas,  sclerosing adenosis, usual hyperplasia, columnar cell change, apocrine cyst, fibroadenomatoid change, negative for malignancy.  0 of 1 sentinel node.  Pathologic stage Tis N0 stage 0.     Dr Fernando called me to comment on the numerous papillomas in the breast.     She will not need radiatio    She denies any redness warmth or drainage from her incision.  She is having only mild discomfort.    Interval History:  July 16, 2021 Dr. Casper left tissue expander exchange for implant and fat grafting with my right mastopexy for symmetry.     Pathology returned as benign unremarkable skin and breast tissue.  February 2, 2022 right diagnostic mammogram with tomosynthesis and right breast ultrasound for palpable right mass for 1 week.  Deep to the area of palpable concern there is question area of architectural distortion near a scar marker.  The mammographic appearance is more conspicuous.   Recommend anterior right breast stereotactic biopsy of the distortion.       On ultrasound, masses at 8:00 and 830 in the right breast recommend 2 site ultrasound-guided biopsy.     Masses at 9:00 in the right breast are probably benign.  If the above pathology results are benign, recommend short-term follow-up right breast ultrasound in 6 months.  BI-RADS 4     4-22- 2022 [facility]      1-  Stereo biopsy right breast architectural distortion  Stereotactic biopsy 8 gauge x6 cores, but bowtie clip was deployed.  Fibroadenomatoid hyperplasia, simple cyst, calcifications, apocrine metaplasia.  2-Ultrasound-guided biopsy 1.9 cm mass at 830, 7 cm in the nipple right breast  Ultrasound-guided biopsy right breast 830, 7 cm from the nipple 10-gauge, 6 specimens, triple twist clip was placed  Usual duct hyperplasia, apocrine metaplasia, fibroadenomatoid hyperplasia, sclerosing adenosis, concordant     3--Ultrasound-guided biopsy of a 7 mm cystic versus solid mass at 8:00, 6 cm from the nipple right breast  Right breast  ultrasound-guided biopsy 8:00, 6 cm from the nipple, 10-gauge x4 cores, wing clip was deployed.  Simple cyst, microcalcifications, focal chronic inflammation, fibroadenomatoid hyperplasia     Patient has been on Arimidex since April 2021.  With Dr. Mariano.        Right breast ultrasound August 3, 2022:  Stable appearing benign cyst in the right breast.  Postbiopsy change in the right breast.  BI-RADS 2 routine follow-up mammography    She is here to discuss RIGHT risk reducing mastectomy- the anxiety from the imaging, biopsies and cancer concern is a morbidity for her.  She was very happy with Dr Casper.    Review of Systems:  Review of Systems   Constitutional: Negative for unexpected weight change (7 lb wt loss).   Cardiovascular: Palpitations: PALPITATIONS AT NIGHT , BUT NOT OFTEN, CLEARED BY CARDIOLOGY IN [place] AFTER ECHO AND HOLTER MONITOR.   Musculoskeletal: Arthralgias: LEFT KNEE PAIN    All other systems reviewed and are negative.       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 2/10/21 BENIGN.    Breast Cancer HIstory:  Patient does not have a past medical history of breast cancer.  Breast Operations, and year:  NONE   Obstetric/Gynecologic History:  Age menstrual periods began: 12  Patient is postmenopausal due to removal of her uterus in the following year: 43   Number of pregnancies:0  Number of live births: 0  Number of abortions or miscarriages: 0  Age of delivery of first child: N/A   BREAST FEEDING: N/A   Length of time taking birth control pills: 2 YRS   Patient is presently taking the following hormone replacement:  No longer taking HRT    PATIENT HAD UTERUS AND BOTH OVARIES REMOVED.     Past Surgical History:   Procedure Laterality Date   • AUGMENTATION MAMMAPLASTY     • BILATERAL OOPHORECTOMY     • BREAST BIOPSY Left  01/05/2021   • BREAST RECONSTRUCTION Left 3/25/2021    Procedure: LEFT PLACEMENT TISSUE EXPANDER AND ALLODERM;  Surgeon: Clifford Casper MD;  Location: [facility] MAIN OR;  Service: Plastics;  Laterality: Left;   • BREAST TISSUE EXPANDER REMOVAL INSERTION OF IMPLANT Left 7/16/2021    Procedure: LEFT REMOVAL TISSUE EXPANDER AND PLACEMENT OF IMPLANT;  Surgeon: Clifford Casper MD;  Location: [facility] MAIN OR;  Service: Plastics;  Laterality: Left;   • CHOLECYSTECTOMY     • CYSTOSCOPY     • FAT GRAFTING Left 7/16/2021    Procedure: FAT GRAFTING;  Surgeon: Clifford Casper MD;  Location: [facility] MAIN OR;  Service: Plastics;  Laterality: Left;   • HYSTERECTOMY     • MASTECTOMY Left    • MASTECTOMY W/ SENTINEL NODE BIOPSY Left 3/25/2021    Procedure: LEFT mastectomy, LEFT axilla sentinel node biopsy,  with or without reconstruction as her surgical treatment.;  Surgeon: Zenia Washington MD;  Location: [facility] MAIN OR;  Service: General;  Laterality: Left;   • MASTOPEXY Right 7/16/2021    Procedure: RIGHT MASTOPEXY FOR SYMMETRY;  Surgeon: Clifford Casper MD;  Location: [facility] OR;  Service: Plastics;  Laterality: Right;   • MRI BREAST BIOPSY UNILATERAL Left 02/10/2021   • OOPHORECTOMY     • SUBTOTAL HYSTERECTOMY  2014, 2015, 2018    Partial in 3 surgeries, but still have cervix       Past Medical History:   Diagnosis Date   • Acid reflux    • Allergic 09/2000    Seasonal allergies   • Breast cancer (HCC)    • Breast cyst    • Cholelithiasis 11/2010    Gallbladet removed in November 2010   • DCIS (ductal carcinoma in situ)     LEFT BREAST   • Drug therapy    • Elevated cholesterol    • Fibrocystic breast    • History of hematuria     MICROSCOPIC   • History of kidney stones    • History of migraine    • Kidney stone 03/2020    [facility]   • Knee pain, left    • Obesity 05/2013    I have been a little overweight since 2013   • Rosacea    • Seasonal allergies        Prior  "Hospitalizations, other than for surgery or childbirth, and year:  NONE     Social History     Socioeconomic History   • Marital status:    Tobacco Use   • Smoking status: Never Smoker   • Smokeless tobacco: Never Used   Vaping Use   • Vaping Use: Never used   Substance and Sexual Activity   • Alcohol use: Never   • Drug use: Never   • Sexual activity: Not Currently     Partners: Male     Birth control/protection: Abstinence, Surgical     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    Family History:  Family History   Problem Relation Age of Onset   • Heart disease Mother         Triple CABG 03/07/2017   • Hypertension Mother    • Hyperlipidemia Mother    • Hyperlipidemia Father    • Hypertension Father    • Malig Hyperthermia Neg Hx        Vital Signs:  /68   Pulse 60   Ht 165.1 cm (65\")   Wt 95.3 kg (210 lb)   LMP  (LMP Unknown)   SpO2 99%   Breastfeeding No   BMI 34.95 kg/m²      Medications:    Current Outpatient Medications:   •  anastrozole (ARIMIDEX) 1 MG tablet, TAKE 1 TABLET BY MOUTH EVERY DAY, Disp: 90 tablet, Rfl: 3  •  calcium carb-cholecalciferol 600-800 MG-UNIT tablet, Take  by mouth Daily., Disp: , Rfl:   •  clotrimazole-betamethasone (LOTRISONE) 1-0.05 % cream, Apply  topically to the appropriate area as directed See Admin Instructions. apply topically to the appropriate area as directed 2 times a day, Disp: 45 each, Rfl: 11  •  sennosides-docusate (PERICOLACE) 8.6-50 MG per tablet, Take 2 tablets by mouth Daily As Needed for Constipation., Disp: 30 tablet, Rfl: 1     Allergies:  No Known Allergies    Physical Examination:  /68   Pulse 60   Ht 165.1 cm (65\")   Wt 95.3 kg (210 lb)   LMP  (LMP Unknown)   SpO2 99%   Breastfeeding No   BMI 34.95 kg/m²   General Appearance:  Patient is in no distress.  She is well kept and has an overweight build.   Psychiatric:  Patient with appropriate mood and affect. Alert " and oriented to self, time, and place.    Breast, RIGHT:  medium sized, 38B, symmetric with the contralateral surgically absent side.  Reduction pattern incision from Dr. Casper's reduction for symmetry with the left.  Well-healed incisions with no erythema warmth or drainage.  Breast skin is without erythema, edema, rashes.  There are no visible abnormalities upon inspection during the arm-raising maneuver or with hands on hips in the sitting position. There is no nipple retraction, discharge or nipple/areolar skin changes.There are no masses palpable in the sitting or supine positions.    Breast, LEFT:  Surgically absent with well-healed transverse incision and nipple areolar complex tattooed.  Implant in place.  No skin nodules or discolorations.      Lymphatic:  There is no axillary, cervical, infraclavicular, or supraclavicular adenopathy bilaterally.  Eyes:  Pupils are round and reactive to light.  Cardiovascular:  Heart rate and rhythm are regular.  Respiratory:  Lungs are clear bilaterally with no crackles or wheezes in any lung field.  Gastrointestinal:  Abdomen is soft, nondistended, and nontender.     Musculoskeletal:  Good strength in all 4 extremities.   There is good range of motion in both shoulders.    Skin:  No new skin lesions or rashes on the skin excluding the breast (see breast exam above).        Imagin2019 BL DIAGNOSTIC MAMMO & RIGHT BREAST US [facility]  Done by [facility] in [facility].  History: Right upper-outer quadrant palpable lump.  MMG:  Heterogeneously dense. No correlate to the palpable lump.  US:  Right whole breast ultrasound, 9:30 palpable lump corresponds to a 2.4 x 2.2 cm simple cyst. There are multiple additional benign cysts. 2 of the largest are 1.8 cm at periareolar 1:00 and 1.4 cm in the retroareolar breast.  IMPRESSION:  Palpable lump is a cyst.  BI-RADS Category 2: Benign.    10/11/2019 [facility]  [facility]  BI-RADS Category 2: Benign.    11/03/2020 BILATERAL SCREENING MAMMO WITH PAULA  Located within [facility]        ALFREDO OSUNA  Heterogeneously dense.  BI-RADS Category 2: Benign.    12/15/2020 LEFT DIAGNOSTIC MMG WITH PAULA & LEFT BREAST US        Located within [facility]       ALFREDO OSUNA  Single episode of spontaneous clear left nipple discharge. She states her left breast feels like it is on fire.  MMG:  Heterogeneously dense. There are multiple masses and/or ducts in the retroareolar left breast, which are located within the densest area of breast parenchyma.  US:  Left ultrasound, 6:00, 1 cm from the nipple, 0.5 x 0.4 x 0.5 cm complicated cyst versus solid mass, may be contiguous with a duct. Otherwise, numerous benign-appearing cysts and ducts are identified.  Complicated cyst versus solid mass at 6:00 in the left breast, evaluation with ultrasound-guided core needle biopsy is recommended.  BI-RADS Category 4: Suspicious.    01/25/2021 BILATERAL BREAST MRI         Located within [facility]           ALFREDO OSUNA  Subareolar right breast, 2.1 cm cyst, which corresponds to the palpable lump.  LEFT BREAST:   6:00, 3.6 cm posterior to the nipple, susceptibility from a biopsy clip, which marks the site of biopsy-proven atypia. No suspicious mass enhancement at this location.   9:00, 3.9 cm posterior to the nipple, there is a 1.2 cm focal area of clumped nonmass enhancement.  IMPRESSION AND RECOMMENDATION:  9:00, left breast. Further evaluation with MRI guided core needle biopsy is recommended.  BI-RADS Category 4: Suspicious.    01/25/2021 RIGHT DIAGNOSTIC MMG WITH PAULA & RIGHT BREAST US    Located within [facility]     ALFREDO OSUNA  CLINICAL INDICATION: Right breast pain and hardening around the right nipple.  MMG:  Heterogeneously dense. There are no suspicious masses, calcifications, or areas of architectural distortion.  US:  Retroareolar right breast, area of concern, 6:00 there is a palpable 2.0  benign-appearing complicated cyst.  IMPRESSION:  BI-RADS Category 2: Benign.     On ultrasound, masses at 8:00 and 830 in the right breast recommend 2 site ultrasound-guided biopsy.     Masses at 9:00 in the right breast are probably benign.  If the above pathology results are benign, recommend short-term follow-up right breast ultrasound in 6 months.  BI-RADS 4     Right breast ultrasound August 3, 2022:  Stable appearing benign cyst in the right breast.  Postbiopsy change in the right breast.  BI-RADS 2 routine follow-up mammography.        Pathology:  01/05/2021 LEFT DIAGNOSTIC MAMMO & US GUIDED BREAST BIOPSY      [facility]      ALFREDO OSUNA  6:00, 1 cm from the nipple in the left breast, 11 g needle. 11 core specimens were obtained. A bowtie clip was then deployed. Bowtie clip at the expected location in the lower central anterior left breast.  Pathology demonstrates severe atypical ductal hyperplasia bordering on low-grade DCIS, which is concordant.  1. 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  Sections demonstrate and area of possible cyst wall/dilated duct with associated apocrine cysts and proliferative breast changes. Multiple ducts demonstrate areas of micropapillary atypia, which by IHC qualify as at least ADH. Definitive diagnosis is best deferred to the excision.    02/10/2021 MRI BREAST BIOPSY           [facility]   ALFREDO OSUNA  An 8 gauge Mammotome. 14 core samples were obtained. A U-shaped clip.   Post-procedural mammographic views of the left breast demonstrate the U-shaped clip at the expected location in the inner central middle to anterior left breast, approximately 3.1 cm medial to the bowtie-shaped biopsy clip at the site of biopsy-proven atypia bordering on DCIS.  Pathology is high risk and concordant with the imaging assessment.  PATHOLOGY:  1. Left Breast, 9:00 o’clock, MRI-Guided  Biopsies:  A. Proliferative breast parenchyma with sclerotic intraductal papillomas with florid duct hyperplasia, columnar cell hyperplasia and apocrine cysts.  B. Focal microcalcifications associated with columnar cell hyperplasia.  C. No atypical hyperplasia, in-situ nor invasive carcinoma identified.      Pathology from 3-25-21 LEFT total mastectomy and SLNB with expander reconstruction Dr Casper returned as :  Multiple intraductal papillomas, sclerosing adenosis, usual hyperplasia, columnar cell change, apocrine cyst, fibroadenomatoid change, negative for malignancy.  0 of 1 sentinel node.  Pathologic stage Tis N0 stage 0.     Dr Fernando called me to comment on the numerous papillomas in the breast.     She will not need radiation          Final Diagnosis    1. Breast, Left, Mastectomy (912.6 grams):               A. Multiple intraductal papillomas, sclerosing adenosis, florid ductal hyperplasia of the usual type, columnar cell         change, clustered apocrine cysts, fibroadenomatoid change and scattered benign microcalcifications.  B. Negative for atypia, in situ and infiltrating carcinoma.     2. Lymph Node [place] #1, Excision:               A. Reactive lymph node (0/1).     3. Skin, Left Breast, Excision:               A. Benign skin and subcutaneous tissue.      [facility]     Electronically signed by Magdalena Fernando MD on 3/26/2021 at 1541    Synoptic Checklist    DCIS OF THE BREAST: Resection  8th Edition - Protocol posted: 2/26/2020  DCIS OF THE BREAST: COMPLETE EXCISION - All Specimens       SPECIMEN   Procedure   Total mastectomy    Specimen Laterality   Left    TUMOR   Tumor Site   Clock position        6 o'clock    Histologic Type   Ductal carcinoma in situ    Size (Extent) of DCIS   Estimated size (extent) of DCIS is at least (Millimeters): 3 (on core biopsy [id number]) mm   Architectural Patterns   Micropapillary    Nuclear Grade   Grade I (low)    Necrosis   Not identified    Microcalcifications    Present in nonneoplastic tissue    MARGINS   Margins   Uninvolved by DCIS    Distance from Closest Margin (Millimeters)   15 (measured from bowtie clip) mm   Closest Margin(s)   Anterior    LYMPH NODES   Regional Lymph Nodes   Uninvolved by tumor cells    Total Number of Lymph Nodes Examined   1    Number of [place] Nodes Examined   1    PATHOLOGIC STAGE CLASSIFICATION (pTNM, AJCC 8th Edition)       Primary Tumor (pT)   pTis (DCIS)    Regional Lymph Nodes Modifier   (sn): [place] node(s) evaluated    Regional Lymph Nodes (pN)   pN0    SPECIAL STUDIES   Breast Biomarker Testing Performed on Previous Biopsy       Estrogen Receptor (ER) Status   Positive    Percentage of Cells with Nuclear Positivity   %    Breast Biomarker Testing Performed on Previous Biopsy       Progesterone Receptor (PgR) Status   Positive    Percentage of Cells with Nuclear Positivity   51-60%    Testing Performed on Case Number   [id number]    .      Comment      The patient's concurrent left breast core biopsy material is on file with our department ([id number]); slides are pulled and reviewed for comparison.  No discordance is noted.          Selected slides from this case are shared in internal consultation with Mu Dominguez and Donnell who concur.     Given that there is no definitive residual in situ carcinoma present in mastectomy specimen. The specimen is staged from the previous core where the DCIS measured 3 mm maximally. This case is discussed with Dr. Washington on 3/26/21.     [facility]         Pathology returned as benign unremarkable skin and breast tissue.  February 2, 2022 right diagnostic mammogram with tomosynthesis and right breast ultrasound for palpable right mass for 1 week.  Deep to the area of palpable concern there is question area of architectural distortion near a scar marker.  The mammographic appearance is more conspicuous.   Recommend anterior right breast stereotactic biopsy of the distortion.        4-22- 2022  [facility]      1-  Stereo biopsy right breast architectural distortion  Stereotactic biopsy 8 gauge x6 cores, but bowtie clip was deployed.  Fibroadenomatoid hyperplasia, simple cyst, calcifications, apocrine metaplasia.  2-Ultrasound-guided biopsy 1.9 cm mass at 830, 7 cm in the nipple right breast  Ultrasound-guided biopsy right breast 830, 7 cm from the nipple 10-gauge, 6 specimens, triple twist clip was placed  Usual duct hyperplasia, apocrine metaplasia, fibroadenomatoid hyperplasia, sclerosing adenosis, concordant     3--Ultrasound-guided biopsy of a 7 mm cystic versus solid mass at 8:00, 6 cm from the nipple right breast  Right breast ultrasound-guided biopsy 8:00, 6 cm from the nipple, 10-gauge x4 cores, wing clip was deployed.  Simple cyst, microcalcifications, focal chronic inflammation, fibroadenomatoid hyperplasia            Multi cancer panel dated February 24, 2021 for [facility] returned as no known mutations.  A variant of uncertain significance was identified inRECQL c.1031 G>A.          Her outside pathology review by Dr Choi returned as :  Low-grade duct carcinoma in situ, micropapillary type, atypical lobular hyperplasia, sclerosing adenosis, intraductal papilloma.     July 16, 2021 Dr. Casper left tissue expander exchange for implant and fat grafting with my right mastopexy for symmetry.     Patient has been on Arimidex since April 2021.  With Dr. Mariano.      Procedures:  Ultrasound-guided cyst aspiration 2-12-21  Indication:  symptomatic cyst  Location: RIGHT Breast 6:00 areolar margin  Consent:  The risks, benefits, and alternatives to the procedure were discussed with the patient, who understood and wished to proceed.  The risks described included, but were not limited to, bleeding, infection, pneumothorax, and cyst recurrence requiring percutaneous excisional biopsy.  Description of Procedure:   After the patient was positioned supine on the procedure table, I located  the cyst using ultrasound.  I prepped and draped the breast skin in sterile fashion.  I anesthetized the breast skin with 1% lidocaine with epinephrine.  I then anesthetized the underlying subcutaneous tissue and breast parenchyma surrounding the lesion with 1% lidocaine with epinephrine under ultrasound visualization and guidance. I then inserted a 21 G needle (attached to a syringe) into the cyst under ultrasound guidance and aspirated the cyst fluid until the cyst completely disappeared. The fluid aspirated was typical cyst fluid, nonbloody. 1cc.  The fluid was discarded.  Manual compression was held for 5 minutes and a bandaid applied.  Marker placed: none  Tolerance: The patient tolerated the procedure well.  Disposition: as below    Assessment:   Diagnosis Plan   1. Fibrocystic disease of right breast     2. Abnormal mammogram     3. Abnormal ultrasound of breast     4. Breast neoplasm, Tis (DCIS), left     5. Papilloma of left breast     6. Family history unknown     7. History of hormone replacement therapy        1-  RIGHT 6:00 areolar margin- 2 cm symptomatic cyst- target for aspiration at initial visit  February 22, 2022 [facility]:  1.  Stereotactic biopsy right breast bowtie clip: Fibroadenomatoid hyperplasia, calcifications, apocrine metaplasia.  2.  Ultrasound-guided biopsy right breast 830, 7 cm the nipple: Usual duct hyperplasia, apocrine metaplasia, fibroadenomatoid hyperplasia, sclerosing adenosis.  Triple twist clip.  3.  Ultrasound-guided biopsy 8:00, 6 cm in the nipple right breast  Calcifications, chronic inflammation, fibroadenomatoid hyperplasia.    2-  Left breast 6:00, 1 cm from the nipple, central, 5 mm mass on ultrasound, severe atypical duct hyperplasia bordering on low-grade duct carcinoma in situ, multiple ducts with micropapillary atypical duct hyperplasia at least, recommend excision.  Bowtie marker.  Note that the bowtie marker and the you marker at the site of  papilloma are 3.1 cm apart.    TisN0- stage 0    2-20-21-  Her outside pathology review by Dr Choi returned as :  Low-grade duct carcinoma in situ, micropapillary type, atypical lobular hyperplasia, sclerosing adenosis, intraductal papilloma.    Pathology from 3-25-21 LEFT total mastectomy and SLNB with prepectoral expander reconstruction Dr Casper returned as :  Multiple intraductal papillomas, sclerosing adenosis, usual hyperplasia, columnar cell change, apocrine cyst, fibroadenomatoid change, negative for malignancy.  0 of 1 sentinel node.  Pathologic stage Tis N0 stage 0.     Dr Fernando called me to comment on the numerous papillomas in the breast.  Dr. Casper took her for an expander exchange for implant, fat grafting and right mastopexy July 2021.  Dr. Mariano started Arimidex in April 2021.  Tolerating this well    Multi cancer panel dated February 24, 2021 for [facility] returned as no known mutations.  A variant of uncertain significance was identified in RECQL c.1031 G>A.     3,6-  LEFT 9:00,  4CFN- 1.2 cm enhancement on MRI- U marker- papilloma, concordant      6-  Does not know family history- family is international  Multi cancer panel dated February 24, 2021 for [facility] returned as no known mutations.  A variant of uncertain significance was identified in RECQL c.1031 G>A.     7-  On HRT at presentation, stopped after consultation    Plan:  The patient goes by Ernestina.  She is here with her  today.  Her symmetry and cosmesis are excellent.  She had recent 3 biopsies of the right breast that all showed fibrocystic changes.  We discussed that usual hyperplasia and the other fibrocystic changes are not precancerous lesions.  We discussed that there is a small increased risk for breast cancer development in women who have proliferative breast changes as compared to those who do not.  She said she understood all of this but she would like to proceed with a right risk reducing mastectomy due to  the anxiety that she feels over the possible development of breast cancer in the right breast.  We discussed the risks of bleeding, infection, skin loss.  We discussed the alternatives of not proceeding.  She wished to have this done.  We will therefore arrange for her to have a right risk reducing mastectomy with possible reconstruction.  We will get her into see Dr. Casper and see her back for surgery.      Her last routine mammogram was February 2, 2022      Zenia Washington MD    Next Appointment:  Return for surgery.    Today I spent 30 minutes doing the following: Reviewing records, labs, outside imaging and reports in preparation for the patient visit; obtaining medical history; performing the physical exam; counseling and educating the patient and any available family or caregivers; ordering medications, tests or procedures; coordinating care with any other physicians on her care team as needed, and documenting all of the above in the medical record as well as sending communications with her other healthcare professionals.      EMR Dragon/transcription disclaimer:    Much of this encounter note is an electronic transcription/translocation of spoken language to printed text.  The electronic translation of spoken language may permit erroneous, or at times, nonsensical words or phrases to be inadvertently transcribed.  Although I have reviewed the note from such areas, some may still exist.

## 2022-08-19 ENCOUNTER — OFFICE VISIT (OUTPATIENT)
Dept: FAMILY MEDICINE CLINIC | Facility: CLINIC | Age: 47
End: 2022-08-19

## 2022-08-19 VITALS
HEIGHT: 65 IN | SYSTOLIC BLOOD PRESSURE: 114 MMHG | TEMPERATURE: 98.5 F | HEART RATE: 62 BPM | WEIGHT: 201.2 LBS | OXYGEN SATURATION: 97 % | BODY MASS INDEX: 33.52 KG/M2 | DIASTOLIC BLOOD PRESSURE: 76 MMHG

## 2022-08-19 DIAGNOSIS — E55.9 VITAMIN D DEFICIENCY: ICD-10-CM

## 2022-08-19 DIAGNOSIS — R73.03 PREDIABETES: ICD-10-CM

## 2022-08-19 DIAGNOSIS — Z12.11 COLON CANCER SCREENING: ICD-10-CM

## 2022-08-19 DIAGNOSIS — R53.82 CHRONIC FATIGUE: ICD-10-CM

## 2022-08-19 DIAGNOSIS — Z00.00 HEALTH MAINTENANCE EXAMINATION: Primary | ICD-10-CM

## 2022-08-19 DIAGNOSIS — R10.31 RIGHT LOWER QUADRANT ABDOMINAL PAIN: ICD-10-CM

## 2022-08-19 PROCEDURE — 99396 PREV VISIT EST AGE 40-64: CPT | Performed by: FAMILY MEDICINE

## 2022-08-19 NOTE — PROGRESS NOTES
Subjective   Ernestina Urrutia is a 47 y.o. female.     Chief Complaint   Patient presents with   • Annual Exam       History of Present Illness       The following portions of the patient's history were reviewed and updated as appropriate: allergies, current medications, past family history, past medical history, past social history, past surgical history and problem list.    Past Medical History:   Diagnosis Date   • Acid reflux    • Allergic 09/2000    Seasonal allergies   • Breast cancer (HCC)    • Breast cyst    • Cholelithiasis 11/2010    Gallbladet removed in November 2010   • DCIS (ductal carcinoma in situ)     LEFT BREAST   • Drug therapy    • Elevated cholesterol    • Fibrocystic breast    • History of hematuria     MICROSCOPIC   • History of kidney stones    • History of migraine    • Kidney stone 03/2020    [facility]   • Knee pain, left    • Obesity 05/2013    I have been a little overweight since 2013   • Rosacea    • Seasonal allergies        Past Surgical History:   Procedure Laterality Date   • AUGMENTATION MAMMAPLASTY     • BILATERAL OOPHORECTOMY     • BREAST BIOPSY Left 01/05/2021   • BREAST RECONSTRUCTION Left 3/25/2021    Procedure: LEFT PLACEMENT TISSUE EXPANDER AND ALLODERM;  Surgeon: Clifford Casper MD;  Location: [facility];  Service: Plastics;  Laterality: Left;   • BREAST TISSUE EXPANDER REMOVAL INSERTION OF IMPLANT Left 7/16/2021    Procedure: LEFT REMOVAL TISSUE EXPANDER AND PLACEMENT OF IMPLANT;  Surgeon: Clifford Casper MD;  Location: [facility];  Service: Plastics;  Laterality: Left;   • CHOLECYSTECTOMY     • CYSTOSCOPY     • FAT GRAFTING Left 7/16/2021    Procedure: FAT GRAFTING;  Surgeon: Clifford Casper MD;  Location: [facility];  Service: Plastics;  Laterality: Left;   • HYSTERECTOMY     • MASTECTOMY Left    • MASTECTOMY W/ SENTINEL NODE BIOPSY Left 3/25/2021    Procedure: LEFT mastectomy, LEFT axilla sentinel node biopsy,   with or without reconstruction as her surgical treatment.;  Surgeon: Zenia Washington MD;  Location: [facility] MAIN OR;  Service: General;  Laterality: Left;   • MASTOPEXY Right 7/16/2021    Procedure: RIGHT MASTOPEXY FOR SYMMETRY;  Surgeon: Clifford Casper MD;  Location: [facility] MAIN OR;  Service: Plastics;  Laterality: Right;   • MRI BREAST BIOPSY UNILATERAL Left 02/10/2021   • OOPHORECTOMY     • SUBTOTAL HYSTERECTOMY  2014, 2015, 2018    Partial in 3 surgeries, but still have cervix       Family History   Problem Relation Age of Onset   • Heart disease Mother         Triple CABG 03/07/2017   • Hypertension Mother    • Hyperlipidemia Mother    • Hyperlipidemia Father    • Hypertension Father    • Malig Hyperthermia Neg Hx        Social History     Socioeconomic History   • Marital status:    Tobacco Use   • Smoking status: Never Smoker   • Smokeless tobacco: Never Used   Vaping Use   • Vaping Use: Never used   Substance and Sexual Activity   • Alcohol use: Never   • Drug use: Never   • Sexual activity: Not Currently     Partners: Male     Birth control/protection: Abstinence, Surgical       Current Outpatient Medications on File Prior to Visit   Medication Sig Dispense Refill   • anastrozole (ARIMIDEX) 1 MG tablet TAKE 1 TABLET BY MOUTH EVERY DAY 90 tablet 3   • calcium carb-cholecalciferol 600-800 MG-UNIT tablet Take  by mouth Daily.     • clotrimazole-betamethasone (LOTRISONE) 1-0.05 % cream Apply  topically to the appropriate area as directed See Admin Instructions. apply topically to the appropriate area as directed 2 times a day 45 each 11   • sennosides-docusate (PERICOLACE) 8.6-50 MG per tablet Take 2 tablets by mouth Daily As Needed for Constipation. 30 tablet 1     No current facility-administered medications on file prior to visit.       Review of Systems   Constitutional:        Cold feeling       Recent Results (from the past 4704 hour(s))   Tissue Pathology Exam    Collection Time:  02/22/22  2:37 PM    Specimen: A: Breast, Right; Tissue    [id number] 2: Breast, Right; Tissue    [id number] 3: Breast, Right; Tissue   Result Value Ref Range    Case Report       Surgical Pathology Report                         Case: [id number]                                  Authorizing Provider:  Zenia Washington MD    Collected:           02/22/2022 02:37 PM          Ordering Location:     [facility]  Received:            02/22/2022 03:01 PM                                 [place]                                                                           Pathologist:           Mayco Patel MD                                                         Specimens:   1) - Breast, Right, Right breast mass 8:30 7 cm FN, not for calcs, removed @ 1425,                  placed in formalin @ 1430                                                                           2) - Breast, Right, Right breast mass 8:00 6 cm FN, removed @ 1431, placed in                       formalin @ 1435                                                                                     3) - Breast, Right, Right stereotactic biopsy/Not for calcifications/ Dr. Peraza/Specimen removed 1321/Formalin time 1325/# 6 cores/ Clockface 9:00                 Clinical Information       Abnormal breast us      Final Diagnosis       1. Breast, Right, 8:30, 7 cm from Nipple, Biopsy: Benign breast tissue with   A. Florid ductal hyperplasia of the usual type.   B. Apocrine metaplasia.   C. Fibroadenomatoid hyperplasia.   D. Sclerosing adenosis.    2. Breast, Right, 8 O'clock, 6 cm from Nipple, Biopsy: Benign breast tissue with    A. Features consistent with simple cyst.   B. Microcalcifications in non-neoplastic tissue.   C. Focal chronic inflammation.   D. Fibroadenomatoid hyperplasia.    3. Breast, Right, 9 O'clock, Biopsy: Benign breast tissue with    A. Fibroadenomatoid hyperplasia.   B. Simple cyst.   C.  "Calcifications in non-neoplastic tissue.   D. Apocrine metaplasia.    troy/jse       Gross Description       1.  Received in formalin labeled with the patient's name and designated \"right breast mass at 8:30 o'clock, 7 cm from nipple, not for calcs\" are six rubbery yellow to gray white core fragments of fibrofatty soft tissue measuring from 0.8 cm up to 1.4 x 0.4 cm in maximal tubal diameter.  The tissue is inked in green and submitted with three cores in 1A and three cores in 1B.  The specimen was collected at 14:25, placed in formalin at 14:30 for an ischemic time of 5 minutes and a total fixation time of 6-1/2 hours.    2.  Received in formalin labeled with the patient's name and designated \"right breast mass at 8 o'clock, 6 cm from nipple\" are three rubbery yellow to gray white core fragments of fibrofatty soft tissue measuring from 1.1 cm up to 1.6 x 0.4 cm in maximal tubal diameter.  The tissue is inked in blue and submitted entirely in 2A.  The specimen was collected at 14:31, placed in formalin at 14:35 for an ischemic time of 4 minutes and a total fixation time of approximately 6-1/2 hours.    3.  Received in formalin labeled with the patient's name and designated \"right stereotactic breast biopsy at 9 o'clock, not for calcifications\" is a CoreTainer device with a six-chamber well device within it labeled 1-6.  The cores are rubbery yellow gray white ranging in size from 1.5 cm up to 2.2 x 0.6 cm in maximal tubal diameter.  The cores are present in all six chamber wells.  The specimen is inked in black and submitted with three cores in 3A and three cores in 3B.  The specimen was collected at 13:21, placed in formalin at 13:25 for an ischemic time of 4 minutes and a total fixation time of approximately 7-1/2 hours.     Total blocks this case:  5    mb/uso/mec/jse       Special Stains       Part 1= E-cadherin and p120 stains are performed. The controls stain appropriately. The stains both have membraneous " staining confirming ductal differentiation.    Part 2= E-cadherin and p120 stains are performed. The controls stain appropriately. The stains both have membraneous staining confirming ductal differentiation.       Tissue Pathology Exam    Collection Time: 02/22/22  3:12 PM    Specimen: A: Breast, Right; Tissue    [id number] 1: Breast, Right; Tissue    [id number] 2: Breast, Right; Tissue   Result Value Ref Range    Case Report       Surgical Pathology Report                         Case: [id number]                                  Authorizing Provider:  Zenia Washington MD    Collected:           02/22/2022 02:37 PM          [facility]  Received:            02/22/2022 03:01 PM                                 [place]                                                                           Pathologist:           Mayco Patel MD                                                         Specimens:   1) - Breast, Right, Right breast mass 8:30 7 cm FN, not for calcs, removed @ 1425,                  placed in formalin @ 1430                                                                           2) - Breast, Right, Right breast mass 8:00 6 cm FN, removed @ 1431, placed in                       formalin @ 1435                                                                                     3) - Breast, Right, Right stereotactic biopsy/Not for calcifications/ Dr. Peraza/Specimen removed 1321/Formalin time 1325/# 6 cores/ Clockface 9:00                 Clinical Information       Abnormal breast us      Final Diagnosis       1. Breast, Right, 8:30, 7 cm from Nipple, Biopsy: Benign breast tissue with   A. Florid ductal hyperplasia of the usual type.   B. Apocrine metaplasia.   C. Fibroadenomatoid hyperplasia.   D. Sclerosing adenosis.    2. Breast, Right, 8 O'clock, 6 cm from Nipple, Biopsy: Benign breast tissue with    A. Features consistent with simple  "cyst.   B. Microcalcifications in non-neoplastic tissue.   C. Focal chronic inflammation.   D. Fibroadenomatoid hyperplasia.    3. Breast, Right, 9 O'clock, Biopsy: Benign breast tissue with    A. Fibroadenomatoid hyperplasia.   B. Simple cyst.   C. Calcifications in non-neoplastic tissue.   D. Apocrine metaplasia.    troy/jse       Gross Description       1.  Received in formalin labeled with the patient's name and designated \"right breast mass at 8:30 o'clock, 7 cm from nipple, not for calcs\" are six rubbery yellow to gray white core fragments of fibrofatty soft tissue measuring from 0.8 cm up to 1.4 x 0.4 cm in maximal tubal diameter.  The tissue is inked in green and submitted with three cores in 1A and three cores in 1B.  The specimen was collected at 14:25, placed in formalin at 14:30 for an ischemic time of 5 minutes and a total fixation time of 6-1/2 hours.    2.  Received in formalin labeled with the patient's name and designated \"right breast mass at 8 o'clock, 6 cm from nipple\" are three rubbery yellow to gray white core fragments of fibrofatty soft tissue measuring from 1.1 cm up to 1.6 x 0.4 cm in maximal tubal diameter.  The tissue is inked in blue and submitted entirely in 2A.  The specimen was collected at 14:31, placed in formalin at 14:35 for an ischemic time of 4 minutes and a total fixation time of approximately 6-1/2 hours.    3.  Received in formalin labeled with the patient's name and designated \"right stereotactic breast biopsy at 9 o'clock, not for calcifications\" is a CoreTainer device with a six-chamber well device within it labeled 1-6.  The cores are rubbery yellow gray white ranging in size from 1.5 cm up to 2.2 x 0.6 cm in maximal tubal diameter.  The cores are present in all six chamber wells.  The specimen is inked in black and submitted with three cores in 3A and three cores in 3B.  The specimen was collected at 13:21, placed in formalin at 13:25 for an ischemic time of 4 minutes and " a total fixation time of approximately 7-1/2 hours.     Total blocks this case:  5    mb/uso/mec/jse       Special Stains       Part 1= E-cadherin and p120 stains are performed. The controls stain appropriately. The stains both have membraneous staining confirming ductal differentiation.    Part 2= E-cadherin and p120 stains are performed. The controls stain appropriately. The stains both have membraneous staining confirming ductal differentiation.       Comprehensive Metabolic Panel    Collection Time: 04/15/22 10:07 AM    Specimen: Blood   Result Value Ref Range    Glucose 103 74 - 124 mg/dL    BUN 12 6 - 20 mg/dL    Creatinine 0.67 0.60 - 1.10 mg/dL    Sodium 140 134 - 145 mmol/L    Potassium 4.0 3.5 - 4.7 mmol/L    Chloride 105 98 - 107 mmol/L    CO2 25.2 22.0 - 29.0 mmol/L    Calcium 9.9 8.5 - 10.2 mg/dL    Total Protein 7.6 6.3 - 8.0 g/dL    Albumin 4.80 3.50 - 5.20 g/dL    ALT (SGPT) 23 0 - 33 U/L    AST (SGOT) 15 0 - 32 U/L    Alkaline Phosphatase 119 (H) 38 - 116 U/L    Total Bilirubin 0.7 0.2 - 1.2 mg/dL    Globulin 2.8 1.8 - 3.5 gm/dL    A/G Ratio 1.7 1.1 - 2.4 g/dL    BUN/Creatinine Ratio 17.9 7.3 - 30.0    Anion Gap 9.8 5.0 - 15.0 mmol/L    eGFR 109.3 >60.0 mL/min/1.73   CBC Auto Differential    Collection Time: 04/15/22 10:07 AM    Specimen: Blood   Result Value Ref Range    WBC 6.14 3.40 - 10.80 10*3/mm3    RBC 4.77 3.77 - 5.28 10*6/mm3    Hemoglobin 14.5 12.0 - 15.9 g/dL    Hematocrit 43.3 34.0 - 46.6 %    MCV 90.8 79.0 - 97.0 fL    MCH 30.4 26.6 - 33.0 pg    MCHC 33.5 31.5 - 35.7 g/dL    RDW 13.1 12.3 - 15.4 %    RDW-SD 43.5 37.0 - 54.0 fl    MPV 10.6 6.0 - 12.0 fL    Platelets 301 140 - 450 10*3/mm3    Neutrophil % 53.5 42.7 - 76.0 %    Lymphocyte % 31.4 19.6 - 45.3 %    Monocyte % 6.5 5.0 - 12.0 %    Eosinophil % 7.8 (H) 0.3 - 6.2 %    Basophil % 0.5 0.0 - 1.5 %    Immature Grans % 0.3 0.0 - 0.5 %    Neutrophils, Absolute 3.28 1.70 - 7.00 10*3/mm3    Lymphocytes, Absolute 1.93 0.70 - 3.10 10*3/mm3  "   Monocytes, Absolute 0.40 0.10 - 0.90 10*3/mm3    Eosinophils, Absolute 0.48 (H) 0.00 - 0.40 10*3/mm3    Basophils, Absolute 0.03 0.00 - 0.20 10*3/mm3    Immature Grans, Absolute 0.02 0.00 - 0.05 10*3/mm3    nRBC 0.0 0.0 - 0.2 /100 WBC     Objective   Vitals:    08/19/22 0950   BP: 114/76   BP Location: Right arm   Patient Position: Sitting   Pulse: 62   Temp: 98.5 °F (36.9 °C)   SpO2: 97%   Weight: 91.3 kg (201 lb 3.2 oz)   Height: 165.1 cm (65\")     Body mass index is 33.48 kg/m².  Physical Exam  Vitals and nursing note reviewed.   Constitutional:       General: She is not in acute distress.     Appearance: Normal appearance. She is well-developed. She is not diaphoretic.   HENT:      Head: Normocephalic.      Right Ear: Tympanic membrane normal.      Left Ear: Tympanic membrane normal.      Mouth/Throat:      Mouth: Mucous membranes are moist.   Eyes:      Pupils: Pupils are equal, round, and reactive to light.   Cardiovascular:      Rate and Rhythm: Normal rate and regular rhythm.   Pulmonary:      Effort: Pulmonary effort is normal. No respiratory distress.      Breath sounds: Normal breath sounds. No wheezing.   Abdominal:      General: Abdomen is flat.      Tenderness: There is abdominal tenderness.   Neurological:      Mental Status: She is alert.           Diagnoses and all orders for this visit:    1. Health maintenance examination (Primary)  -     Cologuard - Stool, Per Rectum; Future    2. Colon cancer screening  -     Cologuard - Stool, Per Rectum; Future    3. Vitamin D deficiency  -     Vitamin D 25 Hydroxy    4. Prediabetes  -     Hemoglobin A1c  -     Comprehensive Metabolic Panel  -     Lipid Panel With LDL / HDL Ratio  -     CBC & Differential    5. Right lower quadrant abdominal pain  -     CT Abdomen Pelvis With Contrast; Future    6. Chronic fatigue  -     TSH Rfx On Abnormal To Free T4      Return in about 1 year (around 8/19/2023) for Annual physical.          "

## 2022-08-20 LAB
25(OH)D3+25(OH)D2 SERPL-MCNC: 33.1 NG/ML (ref 30–100)
ALBUMIN SERPL-MCNC: 5.1 G/DL (ref 3.8–4.8)
ALBUMIN/GLOB SERPL: 2.1 {RATIO} (ref 1.2–2.2)
ALP SERPL-CCNC: 133 IU/L (ref 44–121)
ALT SERPL-CCNC: 23 IU/L (ref 0–32)
AST SERPL-CCNC: 18 IU/L (ref 0–40)
BASOPHILS # BLD AUTO: 0 X10E3/UL (ref 0–0.2)
BASOPHILS NFR BLD AUTO: 1 %
BILIRUB SERPL-MCNC: 1 MG/DL (ref 0–1.2)
BUN SERPL-MCNC: 12 MG/DL (ref 6–24)
BUN/CREAT SERPL: 15 (ref 9–23)
CALCIUM SERPL-MCNC: 10.3 MG/DL (ref 8.7–10.2)
CHLORIDE SERPL-SCNC: 103 MMOL/L (ref 96–106)
CHOLEST SERPL-MCNC: 226 MG/DL (ref 100–199)
CO2 SERPL-SCNC: 24 MMOL/L (ref 20–29)
CREAT SERPL-MCNC: 0.81 MG/DL (ref 0.57–1)
EGFRCR-CYS SERPLBLD CKD-EPI 2021: 90 ML/MIN/1.73
EOSINOPHIL # BLD AUTO: 0.4 X10E3/UL (ref 0–0.4)
EOSINOPHIL NFR BLD AUTO: 6 %
ERYTHROCYTE [DISTWIDTH] IN BLOOD BY AUTOMATED COUNT: 12.9 % (ref 11.7–15.4)
GLOBULIN SER CALC-MCNC: 2.4 G/DL (ref 1.5–4.5)
GLUCOSE SERPL-MCNC: 94 MG/DL (ref 65–99)
HBA1C MFR BLD: 6 % (ref 4.8–5.6)
HCT VFR BLD AUTO: 45.1 % (ref 34–46.6)
HDLC SERPL-MCNC: 47 MG/DL
HGB BLD-MCNC: 14.9 G/DL (ref 11.1–15.9)
IMM GRANULOCYTES # BLD AUTO: 0 X10E3/UL (ref 0–0.1)
IMM GRANULOCYTES NFR BLD AUTO: 0 %
LDLC SERPL CALC-MCNC: 155 MG/DL (ref 0–99)
LDLC/HDLC SERPL: 3.3 RATIO (ref 0–3.2)
LYMPHOCYTES # BLD AUTO: 2.2 X10E3/UL (ref 0.7–3.1)
LYMPHOCYTES NFR BLD AUTO: 34 %
MCH RBC QN AUTO: 29.6 PG (ref 26.6–33)
MCHC RBC AUTO-ENTMCNC: 33 G/DL (ref 31.5–35.7)
MCV RBC AUTO: 90 FL (ref 79–97)
MONOCYTES # BLD AUTO: 0.4 X10E3/UL (ref 0.1–0.9)
MONOCYTES NFR BLD AUTO: 6 %
NEUTROPHILS # BLD AUTO: 3.3 X10E3/UL (ref 1.4–7)
NEUTROPHILS NFR BLD AUTO: 53 %
PLATELET # BLD AUTO: 348 X10E3/UL (ref 150–450)
POTASSIUM SERPL-SCNC: 4.8 MMOL/L (ref 3.5–5.2)
PROT SERPL-MCNC: 7.5 G/DL (ref 6–8.5)
RBC # BLD AUTO: 5.03 X10E6/UL (ref 3.77–5.28)
SODIUM SERPL-SCNC: 143 MMOL/L (ref 134–144)
TRIGL SERPL-MCNC: 133 MG/DL (ref 0–149)
TSH SERPL DL<=0.005 MIU/L-ACNC: 2.15 UIU/ML (ref 0.45–4.5)
VLDLC SERPL CALC-MCNC: 24 MG/DL (ref 5–40)
WBC # BLD AUTO: 6.3 X10E3/UL (ref 3.4–10.8)

## 2022-08-22 ENCOUNTER — TELEPHONE (OUTPATIENT)
Dept: SURGERY | Facility: CLINIC | Age: 47
End: 2022-08-22

## 2022-08-22 NOTE — TELEPHONE ENCOUNTER
Sx Thurs 9/22/2022 at 12 pm, arrive at 10 am.    PAT Thurs 9/15/22 at 10:30 am.    Post Op Tues 10/4/22 at 10 am.    Spoke to pt who v/u and mailing paperwork.    MEB

## 2022-09-15 ENCOUNTER — PRE-ADMISSION TESTING (OUTPATIENT)
Dept: PREADMISSION TESTING | Facility: HOSPITAL | Age: 47
End: 2022-09-15

## 2022-09-15 VITALS
BODY MASS INDEX: 34.49 KG/M2 | HEART RATE: 56 BPM | TEMPERATURE: 97.2 F | SYSTOLIC BLOOD PRESSURE: 125 MMHG | WEIGHT: 207 LBS | DIASTOLIC BLOOD PRESSURE: 74 MMHG | OXYGEN SATURATION: 100 % | HEIGHT: 65 IN | RESPIRATION RATE: 16 BRPM

## 2022-09-15 DIAGNOSIS — D05.12 BREAST NEOPLASM, TIS (DCIS), LEFT: ICD-10-CM

## 2022-09-15 LAB
ANION GAP SERPL CALCULATED.3IONS-SCNC: 9.3 MMOL/L (ref 5–15)
BASOPHILS # BLD AUTO: 0.05 10*3/MM3 (ref 0–0.2)
BASOPHILS NFR BLD AUTO: 0.7 % (ref 0–1.5)
BUN SERPL-MCNC: 10 MG/DL (ref 6–20)
BUN/CREAT SERPL: 13.5 (ref 7–25)
CALCIUM SPEC-SCNC: 9.5 MG/DL (ref 8.6–10.5)
CHLORIDE SERPL-SCNC: 103 MMOL/L (ref 98–107)
CO2 SERPL-SCNC: 25.7 MMOL/L (ref 22–29)
CREAT SERPL-MCNC: 0.74 MG/DL (ref 0.57–1)
DEPRECATED RDW RBC AUTO: 43.6 FL (ref 37–54)
EGFRCR SERPLBLD CKD-EPI 2021: 100.6 ML/MIN/1.73
EOSINOPHIL # BLD AUTO: 0.44 10*3/MM3 (ref 0–0.4)
EOSINOPHIL NFR BLD AUTO: 6.6 % (ref 0.3–6.2)
ERYTHROCYTE [DISTWIDTH] IN BLOOD BY AUTOMATED COUNT: 13.1 % (ref 12.3–15.4)
GLUCOSE SERPL-MCNC: 94 MG/DL (ref 65–99)
HCT VFR BLD AUTO: 41.8 % (ref 34–46.6)
HGB BLD-MCNC: 13.7 G/DL (ref 12–15.9)
IMM GRANULOCYTES # BLD AUTO: 0.02 10*3/MM3 (ref 0–0.05)
IMM GRANULOCYTES NFR BLD AUTO: 0.3 % (ref 0–0.5)
LYMPHOCYTES # BLD AUTO: 2.13 10*3/MM3 (ref 0.7–3.1)
LYMPHOCYTES NFR BLD AUTO: 31.7 % (ref 19.6–45.3)
MCH RBC QN AUTO: 29.7 PG (ref 26.6–33)
MCHC RBC AUTO-ENTMCNC: 32.8 G/DL (ref 31.5–35.7)
MCV RBC AUTO: 90.7 FL (ref 79–97)
MONOCYTES # BLD AUTO: 0.42 10*3/MM3 (ref 0.1–0.9)
MONOCYTES NFR BLD AUTO: 6.3 % (ref 5–12)
NEUTROPHILS NFR BLD AUTO: 3.65 10*3/MM3 (ref 1.7–7)
NEUTROPHILS NFR BLD AUTO: 54.4 % (ref 42.7–76)
NRBC BLD AUTO-RTO: 0 /100 WBC (ref 0–0.2)
PLATELET # BLD AUTO: 308 10*3/MM3 (ref 140–450)
PMV BLD AUTO: 10.4 FL (ref 6–12)
POTASSIUM SERPL-SCNC: 3.8 MMOL/L (ref 3.5–5.2)
RBC # BLD AUTO: 4.61 10*6/MM3 (ref 3.77–5.28)
SODIUM SERPL-SCNC: 138 MMOL/L (ref 136–145)
WBC NRBC COR # BLD: 6.71 10*3/MM3 (ref 3.4–10.8)

## 2022-09-15 PROCEDURE — 36415 COLL VENOUS BLD VENIPUNCTURE: CPT

## 2022-09-15 PROCEDURE — 80048 BASIC METABOLIC PNL TOTAL CA: CPT

## 2022-09-15 PROCEDURE — 85025 COMPLETE CBC W/AUTO DIFF WBC: CPT

## 2022-09-15 NOTE — DISCHARGE INSTRUCTIONS
Take the following medications the morning of surgery:    NO MEDS AM OF SURGERY    ARRIVE AT 10:00      If you are on prescription narcotic pain medication to control your pain you may also take that medication the morning of surgery.    General Instructions:  Do not eat solid food after midnight the night before surgery.  You may drink clear liquids day of surgery but must stop at least one hour before your hospital arrival time.  It is beneficial for you to have a clear drink that contains carbohydrates the day of surgery.  We suggest a 12 to 20 ounce bottle of Gatorade or Powerade for non-diabetic patients or a 12 to 20 ounce bottle of G2 or Powerade Zero for diabetic patients. (Pediatric patients, are not advised to drink a 12 to 20 ounce carbohydrate drink)    Clear liquids are liquids you can see through.  Nothing red in color.     Plain water                               Sports drinks  Sodas                                   Gelatin (Jell-O)  Fruit juices without pulp such as white grape juice and apple juice  Popsicles that contain no fruit or yogurt  Tea or coffee (no cream or milk added)  Gatorade / Powerade  G2 / Powerade Zero      Patients who avoid smoking, chewing tobacco and alcohol for 4 weeks prior to surgery have a reduced risk of post-operative complications.  Quit smoking as many days before surgery as you can.  Do not smoke, use chewing tobacco or drink alcohol the day of surgery.   If applicable bring your C-PAP/ BI-PAP machine.  Bring any papers given to you in the doctor’s office.  Wear clean comfortable clothes.  Do not wear contact lenses, false eyelashes or make-up.  Bring a case for your glasses.   Bring crutches or walker if applicable.  Remove all piercings.  Leave jewelry and any other valuables at home.  Hair extensions with metal clips must be removed prior to surgery.  The Pre-Admission Testing nurse will instruct you to bring medications if unable to obtain an accurate list in  Pre-Admission Testing.            Preventing a Surgical Site Infection:  For 2 to 3 days before surgery, avoid shaving with a razor because the razor can irritate skin and make it easier to develop an infection.    Any areas of open skin can increase the risk of a post-operative wound infection by allowing bacteria to enter and travel throughout the body.  Notify your surgeon if you have any skin wounds / rashes even if it is not near the expected surgical site.  The area will need assessed to determine if surgery should be delayed until it is healed.  The night prior to surgery shower using a fresh bar of anti-bacterial soap (such as Dial) and clean washcloth.  Sleep in a clean bed with clean clothing.  Do not allow pets to sleep with you.  Shower on the morning of surgery using a fresh bar of anti-bacterial soap (such as Dial) and clean washcloth.  Dry with a clean towel and dress in clean clothing.  Ask your surgeon if you will be receiving antibiotics prior to surgery.  Make sure you, your family, and all healthcare providers clean their hands with soap and water or an alcohol based hand  before caring for you or your wound.    Day of surgery:  Your arrival time is approximately two hours before your scheduled surgery time.  Upon arrival, a Pre-op nurse and Anesthesiologist will review your health history, obtain vital signs, and answer questions you may have.  The only belongings needed at this time will be a list of your home medications and if applicable your C-PAP/BI-PAP machine.  A Pre-op nurse will start an IV and you may receive medication in preparation for surgery, including something to help you relax.     Please be aware that surgery does come with discomfort.  We want to make every effort to control your discomfort so please discuss any uncontrolled symptoms with your nurse.   Your doctor will most likely have prescribed pain medications.      If you are going home after surgery you will  receive individualized written care instructions before being discharged.  A responsible adult must drive you to and from the hospital on the day of your surgery and stay with you for 24 hours.  Discharge prescriptions can be filled by the hospital pharmacy during regular pharmacy hours.  If you are having surgery late in the day/evening your prescription may be e-prescribed to your pharmacy.  Please verify your pharmacy hours or chose a 24 hour pharmacy to avoid not having access to your prescription because your pharmacy has closed for the day.    If you are staying overnight following surgery, you will be transported to your hospital room following the recovery period.  [facility] has all private rooms.    If you have any questions please call Pre-Admission Testing at [phone].  Deductibles and co-payments are collected on the day of service. Please be prepared to pay the required co-pay, deductible or deposit on the day of service as defined by your plan.    Call your surgeon immediately if you experience any of the following symptoms:  Sore Throat  Shortness of Breath or difficulty breathing  Cough  Chills  Body soreness or muscle pain  Headache  Fever  New loss of taste or smell  Do not arrive for your surgery ill.  Your procedure will need to be rescheduled to another time.  You will need to call your physician before the day of surgery to avoid any unnecessary exposure to hospital staff as well as other patients.       CHLORHEXIDINE CLOTH INSTRUCTIONS  The morning of surgery follow these instructions using the Chlorhexidine cloths you've been given.  These steps reduce bacteria on the body.  Do not use the cloths near your eyes, ears mouth, genitalia or on open wounds.  Throw the cloths away after use but do not try to flush them down a toilet.      Open and remove one cloth at a time from the package.    Leave the cloth unfolded and begin the bathing.  Massage the skin with the cloths  using gentle pressure to remove bacteria.  Do not scrub harshly.   Follow the steps below with one 2% CHG cloth per area (6 total cloths).  One cloth for neck, shoulders and chest.  One cloth for both arms, hands, fingers and underarms (do underarms last).  One cloth for the abdomen followed by groin.  One cloth for right leg and foot including between the toes.  One cloth for left leg and foot including between the toes.  The last cloth is to be used for the back of the neck, back and buttocks.    Allow the CHG to air dry 3 minutes on the skin which will give it time to work and decrease the chance of irritation.  The skin may feel sticky until it is dry.  Do not rinse with water or any other liquid or you will lose the beneficial effects of the CHG.  If mild skin irritation occurs, do rinse the skin to remove the CHG.  Report this to the nurse at time of admission.  Do not apply lotions, creams, ointments, deodorants or perfumes after using the clothes. Dress in clean clothes before coming to the hospital.

## 2022-09-19 ENCOUNTER — HOSPITAL ENCOUNTER (OUTPATIENT)
Dept: CT IMAGING | Facility: HOSPITAL | Age: 47
Discharge: HOME | End: 2022-09-19
Admitting: FAMILY MEDICINE

## 2022-09-19 DIAGNOSIS — R10.31 RIGHT LOWER QUADRANT ABDOMINAL PAIN: ICD-10-CM

## 2022-09-19 PROCEDURE — 74177 CT ABD & PELVIS W/CONTRAST: CPT

## 2022-09-19 PROCEDURE — 25010000002 IOPAMIDOL 61 % SOLUTION: Performed by: FAMILY MEDICINE

## 2022-09-19 RX ADMIN — IOPAMIDOL 85 ML: 612 INJECTION, SOLUTION INTRAVENOUS at 16:29

## 2022-09-22 ENCOUNTER — HOSPITAL ENCOUNTER (OUTPATIENT)
Facility: HOSPITAL | Age: 47
Discharge: HOME | End: 2022-09-23
Attending: SURGERY | Admitting: SURGERY

## 2022-09-22 ENCOUNTER — ANESTHESIA (OUTPATIENT)
Dept: PERIOP | Facility: HOSPITAL | Age: 47
End: 2022-09-22

## 2022-09-22 ENCOUNTER — ANESTHESIA EVENT (OUTPATIENT)
Dept: PERIOP | Facility: HOSPITAL | Age: 47
End: 2022-09-22

## 2022-09-22 DIAGNOSIS — R92.8 ABNORMAL FINDING ON BREAST IMAGING: Primary | ICD-10-CM

## 2022-09-22 DIAGNOSIS — D05.12 BREAST NEOPLASM, TIS (DCIS), LEFT: ICD-10-CM

## 2022-09-22 PROCEDURE — 25010000002 ONDANSETRON PER 1 MG: Performed by: NURSE ANESTHETIST, CERTIFIED REGISTERED

## 2022-09-22 PROCEDURE — C1789 PROSTHESIS, BREAST, IMP: HCPCS | Performed by: SURGERY

## 2022-09-22 PROCEDURE — 25010000002 CEFAZOLIN PER 500 MG: Performed by: PLASTIC SURGERY

## 2022-09-22 PROCEDURE — S0260 H&P FOR SURGERY: HCPCS | Performed by: SURGERY

## 2022-09-22 PROCEDURE — 19303 MAST SIMPLE COMPLETE: CPT | Performed by: REGISTERED NURSE

## 2022-09-22 PROCEDURE — 25010000002 EPINEPHRINE PER 0.1 MG: Performed by: SURGERY

## 2022-09-22 PROCEDURE — G0378 HOSPITAL OBSERVATION PER HR: HCPCS

## 2022-09-22 PROCEDURE — 25010000002 ROPIVACAINE PER 1 MG: Performed by: PLASTIC SURGERY

## 2022-09-22 PROCEDURE — 19303 MAST SIMPLE COMPLETE: CPT | Performed by: SURGERY

## 2022-09-22 PROCEDURE — 25010000002 PROPOFOL 10 MG/ML EMULSION: Performed by: NURSE ANESTHETIST, CERTIFIED REGISTERED

## 2022-09-22 PROCEDURE — 25010000002 FENTANYL CITRATE (PF) 100 MCG/2ML SOLUTION: Performed by: NURSE ANESTHETIST, CERTIFIED REGISTERED

## 2022-09-22 PROCEDURE — 25010000002 DEXAMETHASONE SODIUM PHOSPHATE 20 MG/5ML SOLUTION: Performed by: NURSE ANESTHETIST, CERTIFIED REGISTERED

## 2022-09-22 PROCEDURE — 25010000002 CEFAZOLIN IN DEXTROSE 2-4 GM/100ML-% SOLUTION: Performed by: SURGERY

## 2022-09-22 PROCEDURE — 88307 TISSUE EXAM BY PATHOLOGIST: CPT | Performed by: SURGERY

## 2022-09-22 PROCEDURE — 25010000002 GENTAMICIN PER 80 MG: Performed by: PLASTIC SURGERY

## 2022-09-22 DEVICE — NATRELLE TE SMOOTH 133S-MX-15-T (US)
Type: IMPLANTABLE DEVICE | Site: BREAST | Status: FUNCTIONAL
Brand: NATRELLE 133S TISSUE EXPANDERS

## 2022-09-22 DEVICE — CLIP LIGAT VASC HORIZON TI MD BLU 24CT: Type: IMPLANTABLE DEVICE | Site: BREAST | Status: FUNCTIONAL

## 2022-09-22 DEVICE — GRFT TISS ALLODERM RTU 128SQ/CM MD 1.6TO0.4MM: Type: IMPLANTABLE DEVICE | Site: BREAST | Status: FUNCTIONAL

## 2022-09-22 DEVICE — CLIP LIGAT VASC HORIZON TI SM/WD RED 24CT: Type: IMPLANTABLE DEVICE | Site: BREAST | Status: FUNCTIONAL

## 2022-09-22 RX ORDER — HYDRALAZINE HYDROCHLORIDE 20 MG/ML
5 INJECTION INTRAMUSCULAR; INTRAVENOUS
Status: DISCONTINUED | OUTPATIENT
Start: 2022-09-22 | End: 2022-09-22 | Stop reason: HOSPADM

## 2022-09-22 RX ORDER — OXYCODONE HYDROCHLORIDE 5 MG/1
10 TABLET ORAL ONCE
Status: COMPLETED | OUTPATIENT
Start: 2022-09-22 | End: 2022-09-22

## 2022-09-22 RX ORDER — ENOXAPARIN SODIUM 100 MG/ML
40 INJECTION SUBCUTANEOUS DAILY
Status: DISCONTINUED | OUTPATIENT
Start: 2022-09-23 | End: 2022-09-23 | Stop reason: HOSPADM

## 2022-09-22 RX ORDER — IBUPROFEN 600 MG/1
600 TABLET ORAL ONCE AS NEEDED
Status: DISCONTINUED | OUTPATIENT
Start: 2022-09-22 | End: 2022-09-22 | Stop reason: HOSPADM

## 2022-09-22 RX ORDER — PROPOFOL 10 MG/ML
VIAL (ML) INTRAVENOUS AS NEEDED
Status: DISCONTINUED | OUTPATIENT
Start: 2022-09-22 | End: 2022-09-22 | Stop reason: SURG

## 2022-09-22 RX ORDER — MIDAZOLAM HYDROCHLORIDE 1 MG/ML
1 INJECTION INTRAMUSCULAR; INTRAVENOUS
Status: DISCONTINUED | OUTPATIENT
Start: 2022-09-22 | End: 2022-09-22 | Stop reason: HOSPADM

## 2022-09-22 RX ORDER — ONDANSETRON 2 MG/ML
INJECTION INTRAMUSCULAR; INTRAVENOUS AS NEEDED
Status: DISCONTINUED | OUTPATIENT
Start: 2022-09-22 | End: 2022-09-22 | Stop reason: SURG

## 2022-09-22 RX ORDER — SODIUM CHLORIDE 0.9 % (FLUSH) 0.9 %
3 SYRINGE (ML) INJECTION EVERY 12 HOURS SCHEDULED
Status: DISCONTINUED | OUTPATIENT
Start: 2022-09-22 | End: 2022-09-22 | Stop reason: HOSPADM

## 2022-09-22 RX ORDER — ROPIVACAINE HYDROCHLORIDE 5 MG/ML
INJECTION, SOLUTION EPIDURAL; INFILTRATION; PERINEURAL AS NEEDED
Status: DISCONTINUED | OUTPATIENT
Start: 2022-09-22 | End: 2022-09-22 | Stop reason: HOSPADM

## 2022-09-22 RX ORDER — FENTANYL CITRATE 50 UG/ML
50 INJECTION, SOLUTION INTRAMUSCULAR; INTRAVENOUS
Status: DISCONTINUED | OUTPATIENT
Start: 2022-09-22 | End: 2022-09-22 | Stop reason: HOSPADM

## 2022-09-22 RX ORDER — CELECOXIB 200 MG/1
400 CAPSULE ORAL ONCE
Status: COMPLETED | OUTPATIENT
Start: 2022-09-22 | End: 2022-09-22

## 2022-09-22 RX ORDER — SODIUM CHLORIDE 0.9 % (FLUSH) 0.9 %
3 SYRINGE (ML) INJECTION EVERY 12 HOURS SCHEDULED
Status: DISCONTINUED | OUTPATIENT
Start: 2022-09-22 | End: 2022-09-23 | Stop reason: HOSPADM

## 2022-09-22 RX ORDER — DOXYCYCLINE 100 MG/1
200 CAPSULE ORAL ONCE
Status: COMPLETED | OUTPATIENT
Start: 2022-09-22 | End: 2022-09-22

## 2022-09-22 RX ORDER — EPHEDRINE SULFATE 50 MG/ML
5 INJECTION, SOLUTION INTRAVENOUS ONCE AS NEEDED
Status: DISCONTINUED | OUTPATIENT
Start: 2022-09-22 | End: 2022-09-22 | Stop reason: HOSPADM

## 2022-09-22 RX ORDER — CEFAZOLIN SODIUM 2 G/100ML
2 INJECTION, SOLUTION INTRAVENOUS ONCE
Status: COMPLETED | OUTPATIENT
Start: 2022-09-22 | End: 2022-09-22

## 2022-09-22 RX ORDER — ONDANSETRON HYDROCHLORIDE 8 MG/1
8 TABLET, FILM COATED ORAL ONCE
Status: DISCONTINUED | OUTPATIENT
Start: 2022-09-22 | End: 2022-09-22 | Stop reason: HOSPADM

## 2022-09-22 RX ORDER — ACETAMINOPHEN 325 MG/1
650 TABLET ORAL EVERY 4 HOURS PRN
Status: DISCONTINUED | OUTPATIENT
Start: 2022-09-22 | End: 2022-09-23 | Stop reason: HOSPADM

## 2022-09-22 RX ORDER — ONDANSETRON 4 MG/1
4 TABLET, FILM COATED ORAL EVERY 6 HOURS PRN
Status: DISCONTINUED | OUTPATIENT
Start: 2022-09-22 | End: 2022-09-23 | Stop reason: HOSPADM

## 2022-09-22 RX ORDER — HYDROMORPHONE HYDROCHLORIDE 1 MG/ML
0.25 INJECTION, SOLUTION INTRAMUSCULAR; INTRAVENOUS; SUBCUTANEOUS
Status: DISCONTINUED | OUTPATIENT
Start: 2022-09-22 | End: 2022-09-23 | Stop reason: HOSPADM

## 2022-09-22 RX ORDER — DIPHENHYDRAMINE HYDROCHLORIDE 50 MG/ML
12.5 INJECTION INTRAMUSCULAR; INTRAVENOUS
Status: DISCONTINUED | OUTPATIENT
Start: 2022-09-22 | End: 2022-09-22 | Stop reason: HOSPADM

## 2022-09-22 RX ORDER — SODIUM CHLORIDE 0.9 % (FLUSH) 0.9 %
3-10 SYRINGE (ML) INJECTION AS NEEDED
Status: DISCONTINUED | OUTPATIENT
Start: 2022-09-22 | End: 2022-09-23 | Stop reason: HOSPADM

## 2022-09-22 RX ORDER — ROCURONIUM BROMIDE 10 MG/ML
INJECTION, SOLUTION INTRAVENOUS AS NEEDED
Status: DISCONTINUED | OUTPATIENT
Start: 2022-09-22 | End: 2022-09-22 | Stop reason: SURG

## 2022-09-22 RX ORDER — SODIUM CHLORIDE, SODIUM LACTATE, POTASSIUM CHLORIDE, CALCIUM CHLORIDE 600; 310; 30; 20 MG/100ML; MG/100ML; MG/100ML; MG/100ML
125 INJECTION, SOLUTION INTRAVENOUS CONTINUOUS
Status: DISCONTINUED | OUTPATIENT
Start: 2022-09-22 | End: 2022-09-22

## 2022-09-22 RX ORDER — PROMETHAZINE HYDROCHLORIDE 25 MG/1
25 TABLET ORAL ONCE AS NEEDED
Status: DISCONTINUED | OUTPATIENT
Start: 2022-09-22 | End: 2022-09-22 | Stop reason: HOSPADM

## 2022-09-22 RX ORDER — HYDROCODONE BITARTRATE AND ACETAMINOPHEN 7.5; 325 MG/1; MG/1
1 TABLET ORAL ONCE AS NEEDED
Status: DISCONTINUED | OUTPATIENT
Start: 2022-09-22 | End: 2022-09-22 | Stop reason: HOSPADM

## 2022-09-22 RX ORDER — OXYCODONE HYDROCHLORIDE 5 MG/1
5 TABLET ORAL EVERY 4 HOURS PRN
Status: DISCONTINUED | OUTPATIENT
Start: 2022-09-22 | End: 2022-09-23 | Stop reason: HOSPADM

## 2022-09-22 RX ORDER — FENTANYL CITRATE 50 UG/ML
INJECTION, SOLUTION INTRAMUSCULAR; INTRAVENOUS AS NEEDED
Status: DISCONTINUED | OUTPATIENT
Start: 2022-09-22 | End: 2022-09-22 | Stop reason: SURG

## 2022-09-22 RX ORDER — HYDROMORPHONE HYDROCHLORIDE 1 MG/ML
0.5 INJECTION, SOLUTION INTRAMUSCULAR; INTRAVENOUS; SUBCUTANEOUS
Status: DISCONTINUED | OUTPATIENT
Start: 2022-09-22 | End: 2022-09-22 | Stop reason: HOSPADM

## 2022-09-22 RX ORDER — SODIUM CHLORIDE 0.9 % (FLUSH) 0.9 %
3-10 SYRINGE (ML) INJECTION AS NEEDED
Status: DISCONTINUED | OUTPATIENT
Start: 2022-09-22 | End: 2022-09-22 | Stop reason: HOSPADM

## 2022-09-22 RX ORDER — ONDANSETRON 8 MG/1
8 TABLET, ORALLY DISINTEGRATING ORAL EVERY 6 HOURS PRN
Status: DISCONTINUED | OUTPATIENT
Start: 2022-09-22 | End: 2022-09-23 | Stop reason: HOSPADM

## 2022-09-22 RX ORDER — OXYCODONE AND ACETAMINOPHEN 7.5; 325 MG/1; MG/1
1 TABLET ORAL EVERY 4 HOURS PRN
Status: DISCONTINUED | OUTPATIENT
Start: 2022-09-22 | End: 2022-09-22 | Stop reason: HOSPADM

## 2022-09-22 RX ORDER — TRANEXAMIC ACID 100 MG/ML
INJECTION, SOLUTION INTRAVENOUS AS NEEDED
Status: DISCONTINUED | OUTPATIENT
Start: 2022-09-22 | End: 2022-09-22 | Stop reason: HOSPADM

## 2022-09-22 RX ORDER — GABAPENTIN 300 MG/1
300 CAPSULE ORAL ONCE
Status: COMPLETED | OUTPATIENT
Start: 2022-09-22 | End: 2022-09-22

## 2022-09-22 RX ORDER — DEXAMETHASONE SODIUM PHOSPHATE 4 MG/ML
INJECTION, SOLUTION INTRA-ARTICULAR; INTRALESIONAL; INTRAMUSCULAR; INTRAVENOUS; SOFT TISSUE AS NEEDED
Status: DISCONTINUED | OUTPATIENT
Start: 2022-09-22 | End: 2022-09-22 | Stop reason: SURG

## 2022-09-22 RX ORDER — ONDANSETRON 2 MG/ML
4 INJECTION INTRAMUSCULAR; INTRAVENOUS ONCE AS NEEDED
Status: DISCONTINUED | OUTPATIENT
Start: 2022-09-22 | End: 2022-09-22 | Stop reason: HOSPADM

## 2022-09-22 RX ORDER — DOXYCYCLINE 100 MG/1
100 CAPSULE ORAL EVERY 12 HOURS SCHEDULED
Status: DISCONTINUED | OUTPATIENT
Start: 2022-09-22 | End: 2022-09-23 | Stop reason: HOSPADM

## 2022-09-22 RX ORDER — NALOXONE HCL 0.4 MG/ML
0.2 VIAL (ML) INJECTION AS NEEDED
Status: DISCONTINUED | OUTPATIENT
Start: 2022-09-22 | End: 2022-09-22 | Stop reason: HOSPADM

## 2022-09-22 RX ORDER — FAMOTIDINE 10 MG/ML
20 INJECTION, SOLUTION INTRAVENOUS ONCE
Status: COMPLETED | OUTPATIENT
Start: 2022-09-22 | End: 2022-09-22

## 2022-09-22 RX ORDER — FLUMAZENIL 0.1 MG/ML
0.2 INJECTION INTRAVENOUS AS NEEDED
Status: DISCONTINUED | OUTPATIENT
Start: 2022-09-22 | End: 2022-09-22 | Stop reason: HOSPADM

## 2022-09-22 RX ORDER — NALOXONE HCL 0.4 MG/ML
0.1 VIAL (ML) INJECTION
Status: DISCONTINUED | OUTPATIENT
Start: 2022-09-22 | End: 2022-09-23 | Stop reason: HOSPADM

## 2022-09-22 RX ORDER — LABETALOL HYDROCHLORIDE 5 MG/ML
5 INJECTION, SOLUTION INTRAVENOUS
Status: DISCONTINUED | OUTPATIENT
Start: 2022-09-22 | End: 2022-09-22 | Stop reason: HOSPADM

## 2022-09-22 RX ORDER — LIDOCAINE HYDROCHLORIDE 20 MG/ML
INJECTION, SOLUTION EPIDURAL; INFILTRATION; INTRACAUDAL; PERINEURAL AS NEEDED
Status: DISCONTINUED | OUTPATIENT
Start: 2022-09-22 | End: 2022-09-22 | Stop reason: SURG

## 2022-09-22 RX ORDER — GABAPENTIN 100 MG/1
100 CAPSULE ORAL EVERY 8 HOURS SCHEDULED
Status: DISCONTINUED | OUTPATIENT
Start: 2022-09-22 | End: 2022-09-23 | Stop reason: HOSPADM

## 2022-09-22 RX ORDER — LIDOCAINE HYDROCHLORIDE 10 MG/ML
0.5 INJECTION, SOLUTION EPIDURAL; INFILTRATION; INTRACAUDAL; PERINEURAL ONCE AS NEEDED
Status: DISCONTINUED | OUTPATIENT
Start: 2022-09-22 | End: 2022-09-22 | Stop reason: HOSPADM

## 2022-09-22 RX ORDER — SODIUM CHLORIDE, SODIUM LACTATE, POTASSIUM CHLORIDE, CALCIUM CHLORIDE 600; 310; 30; 20 MG/100ML; MG/100ML; MG/100ML; MG/100ML
100 INJECTION, SOLUTION INTRAVENOUS CONTINUOUS
Status: DISCONTINUED | OUTPATIENT
Start: 2022-09-22 | End: 2022-09-22

## 2022-09-22 RX ORDER — ONDANSETRON 2 MG/ML
4 INJECTION INTRAMUSCULAR; INTRAVENOUS EVERY 6 HOURS PRN
Status: DISCONTINUED | OUTPATIENT
Start: 2022-09-22 | End: 2022-09-23 | Stop reason: HOSPADM

## 2022-09-22 RX ORDER — SODIUM CHLORIDE, SODIUM LACTATE, POTASSIUM CHLORIDE, CALCIUM CHLORIDE 600; 310; 30; 20 MG/100ML; MG/100ML; MG/100ML; MG/100ML
9 INJECTION, SOLUTION INTRAVENOUS CONTINUOUS
Status: DISCONTINUED | OUTPATIENT
Start: 2022-09-22 | End: 2022-09-22

## 2022-09-22 RX ORDER — SCOLOPAMINE TRANSDERMAL SYSTEM 1 MG/1
1 PATCH, EXTENDED RELEASE TRANSDERMAL ONCE
Status: DISCONTINUED | OUTPATIENT
Start: 2022-09-22 | End: 2022-09-22

## 2022-09-22 RX ORDER — DIPHENHYDRAMINE HCL 25 MG
25 CAPSULE ORAL
Status: DISCONTINUED | OUTPATIENT
Start: 2022-09-22 | End: 2022-09-22 | Stop reason: HOSPADM

## 2022-09-22 RX ORDER — PROMETHAZINE HYDROCHLORIDE 25 MG/1
25 SUPPOSITORY RECTAL ONCE AS NEEDED
Status: DISCONTINUED | OUTPATIENT
Start: 2022-09-22 | End: 2022-09-22 | Stop reason: HOSPADM

## 2022-09-22 RX ORDER — ACETAMINOPHEN 500 MG
1000 TABLET ORAL ONCE
Status: COMPLETED | OUTPATIENT
Start: 2022-09-22 | End: 2022-09-22

## 2022-09-22 RX ADMIN — ROCURONIUM BROMIDE 20 MG: 10 INJECTION, SOLUTION INTRAVENOUS at 10:31

## 2022-09-22 RX ADMIN — LIDOCAINE HYDROCHLORIDE 80 MG: 20 INJECTION, SOLUTION EPIDURAL; INFILTRATION; INTRACAUDAL; PERINEURAL at 09:59

## 2022-09-22 RX ADMIN — CELECOXIB 400 MG: 200 CAPSULE ORAL at 08:44

## 2022-09-22 RX ADMIN — DOXYCYCLINE 100 MG: 100 CAPSULE ORAL at 20:54

## 2022-09-22 RX ADMIN — GABAPENTIN 300 MG: 300 CAPSULE ORAL at 09:47

## 2022-09-22 RX ADMIN — FENTANYL CITRATE 50 MCG: 50 INJECTION, SOLUTION INTRAMUSCULAR; INTRAVENOUS at 10:45

## 2022-09-22 RX ADMIN — Medication 3 ML: at 20:58

## 2022-09-22 RX ADMIN — ACETAMINOPHEN 650 MG: 325 TABLET, FILM COATED ORAL at 20:57

## 2022-09-22 RX ADMIN — FAMOTIDINE 20 MG: 10 INJECTION INTRAVENOUS at 09:41

## 2022-09-22 RX ADMIN — ROCURONIUM BROMIDE 30 MG: 10 INJECTION, SOLUTION INTRAVENOUS at 11:00

## 2022-09-22 RX ADMIN — SODIUM CHLORIDE, POTASSIUM CHLORIDE, SODIUM LACTATE AND CALCIUM CHLORIDE: 600; 310; 30; 20 INJECTION, SOLUTION INTRAVENOUS at 12:45

## 2022-09-22 RX ADMIN — ACETAMINOPHEN 1000 MG: 500 TABLET, FILM COATED ORAL at 08:44

## 2022-09-22 RX ADMIN — GABAPENTIN 100 MG: 100 CAPSULE ORAL at 17:27

## 2022-09-22 RX ADMIN — OXYCODONE 10 MG: 5 TABLET ORAL at 09:47

## 2022-09-22 RX ADMIN — PROPOFOL 200 MG: 10 INJECTION, EMULSION INTRAVENOUS at 09:59

## 2022-09-22 RX ADMIN — FENTANYL CITRATE 50 MCG: 50 INJECTION, SOLUTION INTRAMUSCULAR; INTRAVENOUS at 09:59

## 2022-09-22 RX ADMIN — SODIUM CHLORIDE, POTASSIUM CHLORIDE, SODIUM LACTATE AND CALCIUM CHLORIDE 100 ML/HR: 600; 310; 30; 20 INJECTION, SOLUTION INTRAVENOUS at 08:39

## 2022-09-22 RX ADMIN — ONDANSETRON 4 MG: 2 INJECTION INTRAMUSCULAR; INTRAVENOUS at 12:27

## 2022-09-22 RX ADMIN — GABAPENTIN 100 MG: 100 CAPSULE ORAL at 23:33

## 2022-09-22 RX ADMIN — DEXAMETHASONE SODIUM PHOSPHATE 8 MG: 4 INJECTION, SOLUTION INTRAMUSCULAR; INTRAVENOUS at 10:11

## 2022-09-22 RX ADMIN — CEFAZOLIN SODIUM 2 G: 2 INJECTION, SOLUTION INTRAVENOUS at 09:49

## 2022-09-22 RX ADMIN — DOXYCYCLINE 200 MG: 100 CAPSULE ORAL at 08:44

## 2022-09-22 RX ADMIN — ROCURONIUM BROMIDE 30 MG: 10 INJECTION, SOLUTION INTRAVENOUS at 09:59

## 2022-09-22 NOTE — ANESTHESIA POSTPROCEDURE EVALUATION
Patient: Ernestina Urrutia    Procedure Summary     Date: 09/22/22 Room / Location: [facility] OR  / [facility] MAIN OR    Anesthesia Start: 0953 Anesthesia Stop: 1255    Procedures:       RIGHT risk reducing mastectomy (Right Breast)      RIGHT PLACEMENT TISSUE EXPANDER AND ALLODERM (Right Breast) Diagnosis:       Breast neoplasm, Tis (DCIS), left      (Breast neoplasm, Tis (DCIS), left [D05.12])    Surgeons: Zenia Washington MD; Clifford Casper MD Provider: Levon Landin MD    Anesthesia Type: general ASA Status: 2          Anesthesia Type: general    Vitals  Vitals Value Taken Time   /81 09/22/22 1336   Temp 37.1 °C (98.7 °F) 09/22/22 1251   Pulse 61 09/22/22 1346   Resp 16 09/22/22 1320   SpO2 94 % 09/22/22 1346   Vitals shown include unvalidated device data.        Post Anesthesia Care and Evaluation    Patient location during evaluation: PACU  Patient participation: complete - patient participated  Level of consciousness: awake and alert  Pain management: adequate    Airway patency: patent  Anesthetic complications: No anesthetic complications    Cardiovascular status: acceptable  Respiratory status: acceptable  Hydration status: acceptable    Comments: --------------------            09/22/22               1320     --------------------   BP:       129/85     Pulse:      69       Resp:       16       Temp:                SpO2:      97%      --------------------

## 2022-09-22 NOTE — ANESTHESIA PROCEDURE NOTES
Airway  Urgency: elective    Date/Time: 9/22/2022 10:05 AM  Airway not difficult    General Information and Staff    Patient location during procedure: OR  CRNA/CAA: Eva Fam CRNA    Indications and Patient Condition  Indications for airway management: airway protection    Preoxygenated: yes  Mask difficulty assessment: 2 - vent by mask + OA or adjuvant +/- NMBA    Final Airway Details  Final airway type: endotracheal airway      Successful airway: ETT  Cuffed: yes   Successful intubation technique: direct laryngoscopy  Endotracheal tube insertion site: oral  Blade: Araujo  Blade size: 2  ETT size (mm): 7.0  Cormack-Lehane Classification: grade I - full view of glottis  Placement verified by: chest auscultation and capnometry   Cuff volume (mL): 8  Number of attempts at approach: 1  Assessment: lips, teeth, and gum same as pre-op and atraumatic intubation    Additional Comments  PreO2 with 100% O2;  FeO2 >85%;  sniff position; easy mask ventilation;  Intubated with no difficultly after eyes taped; Appears atraumatic;  Lips and teeth intact as preop condition;  Airway secured. Connected to ventilator.

## 2022-09-23 ENCOUNTER — TELEPHONE (OUTPATIENT)
Dept: SURGERY | Facility: CLINIC | Age: 47
End: 2022-09-23

## 2022-09-23 ENCOUNTER — READMISSION MANAGEMENT (OUTPATIENT)
Dept: CALL CENTER | Facility: HOSPITAL | Age: 47
End: 2022-09-23

## 2022-09-23 VITALS
WEIGHT: 205.03 LBS | HEIGHT: 65 IN | BODY MASS INDEX: 34.16 KG/M2 | OXYGEN SATURATION: 96 % | DIASTOLIC BLOOD PRESSURE: 61 MMHG | SYSTOLIC BLOOD PRESSURE: 121 MMHG | RESPIRATION RATE: 16 BRPM | TEMPERATURE: 97 F | HEART RATE: 66 BPM

## 2022-09-23 LAB
LAB AP CASE REPORT: NORMAL
LAB AP CLINICAL INFORMATION: NORMAL
PATH REPORT.FINAL DX SPEC: NORMAL
PATH REPORT.GROSS SPEC: NORMAL

## 2022-09-23 PROCEDURE — 99024 POSTOP FOLLOW-UP VISIT: CPT | Performed by: SURGERY

## 2022-09-23 PROCEDURE — G0378 HOSPITAL OBSERVATION PER HR: HCPCS

## 2022-09-23 PROCEDURE — 25010000002 ENOXAPARIN PER 10 MG: Performed by: PLASTIC SURGERY

## 2022-09-23 RX ORDER — ONDANSETRON 8 MG/1
8 TABLET, ORALLY DISINTEGRATING ORAL EVERY 8 HOURS PRN
Qty: 10 TABLET | Refills: 1 | Status: SHIPPED | OUTPATIENT
Start: 2022-09-23 | End: 2022-12-28

## 2022-09-23 RX ORDER — AMOXICILLIN 250 MG
2 CAPSULE ORAL DAILY PRN
Qty: 30 TABLET | Refills: 1 | Status: SHIPPED | OUTPATIENT
Start: 2022-09-23 | End: 2022-12-28

## 2022-09-23 RX ORDER — GABAPENTIN 100 MG/1
100 CAPSULE ORAL EVERY 8 HOURS
Qty: 60 CAPSULE | Refills: 2 | Status: SHIPPED | OUTPATIENT
Start: 2022-09-23 | End: 2022-12-28

## 2022-09-23 RX ORDER — DOCUSATE SODIUM 250 MG
250 CAPSULE ORAL 2 TIMES DAILY PRN
Qty: 60 CAPSULE | Refills: 1 | Status: SHIPPED | OUTPATIENT
Start: 2022-09-23 | End: 2022-12-28

## 2022-09-23 RX ORDER — DOXYCYCLINE 100 MG/1
100 CAPSULE ORAL 2 TIMES DAILY
Qty: 10 CAPSULE | Refills: 0 | Status: SHIPPED | OUTPATIENT
Start: 2022-09-23 | End: 2022-09-28

## 2022-09-23 RX ORDER — ACETAMINOPHEN 325 MG/1
650 TABLET ORAL EVERY 4 HOURS PRN
Qty: 60 TABLET | Refills: 0 | Status: SHIPPED | OUTPATIENT
Start: 2022-09-23 | End: 2022-12-28

## 2022-09-23 RX ADMIN — Medication 3 ML: at 08:19

## 2022-09-23 RX ADMIN — GABAPENTIN 100 MG: 100 CAPSULE ORAL at 06:42

## 2022-09-23 RX ADMIN — ENOXAPARIN SODIUM 40 MG: 100 INJECTION SUBCUTANEOUS at 08:19

## 2022-09-23 RX ADMIN — DOXYCYCLINE 100 MG: 100 CAPSULE ORAL at 08:19

## 2022-09-23 NOTE — TELEPHONE ENCOUNTER
9-22-21 pathology returned as RIGHT RRM-benign breast parenchyma with radial scars involved by usual duct hyperplasia, fibroadenomatoid change, apocrine cyst, scattered micro papillomas.  Negative for atypia.  We will let her know.  Final Diagnosis   1. Right Breast, Simple Skin-Sparing Mastectomy (1,040 grams):               A. Benign breast parenchyma with radial scars involved by usual ductal hyperplasia, fibroadenomatoid                   change, cluster of apocrine cysts, and scattered micropapillomas.               B. Three clips and biopsy site changes present.               C. Unremarkable skin and nipple.               D. Negative for atypical hyperplasia and malignancy.    Electronically signed by Kathrine Dominguez MD on 9/23/2022 at 1112

## 2022-09-23 NOTE — OUTREACH NOTE
Prep Survey    Flowsheet Row Responses   [facility] facility patient discharged from? [facility]   Is LACE score < 7 ? Yes   Emergency Room discharge w/ pulse ox? No   Eligibility [facility]   Date of Admission 09/22/22   Date of Discharge 09/23/22   Discharge Disposition Home or Self Care   Discharge diagnosis right breast CA, right mastectomy & placement of breast tissue expanders   Does the patient have one of the following disease processes/diagnoses(primary or secondary)? General Surgery   Does the patient have Home health ordered? No   Is there a DME ordered? No   Prep survey completed? Yes          ANISHA SEPULVEDA - Registered Nurse

## 2022-09-26 ENCOUNTER — TRANSITIONAL CARE MANAGEMENT TELEPHONE ENCOUNTER (OUTPATIENT)
Dept: CALL CENTER | Facility: HOSPITAL | Age: 47
End: 2022-09-26

## 2022-09-26 NOTE — OUTREACH NOTE
Call Center TCM Note    Flowsheet Row Responses   [facility] patient discharged from? [facility]   Does the patient have one of the following disease processes/diagnoses(primary or secondary)? General Surgery   TCM attempt successful? Yes   Call start time 1424   Call end time 1431   Discharge diagnosis right breast CA, right mastectomy & placement of breast tissue expanders   Person spoke with today (if not patient) and relationship Patient   Meds reviewed with patient/caregiver? Yes   Is the patient having any side effects they believe may be caused by any medication additions or changes? No   Does the patient have all medications related to this admission filled (includes all antibiotics, pain medications, etc.) Yes   Is the patient taking all medications as directed (includes completed medication regime)? Yes   Comments Post-Op 10/4/22,  Plastic surgeon 9/28/22   Psychosocial issues? No   Did the patient receive a copy of their discharge instructions? Yes   Nursing interventions Reviewed instructions with patient   What is the patient's perception of their health status since discharge? Improving   Nursing interventions Nurse provided patient education   Is the patient /caregiver able to teach back basic post-op care? Take showers only when approved by MD-sponge bathe until then, No tub bath, swimming, or hot tub until instructed by MD, Keep incision areas clean,dry and protected, Lifting as instructed by MD in discharge instructions   Is the patient/caregiver able to teach back signs and symptoms of incisional infection? Increased redness, swelling or pain at the incisonal site, Increased drainage or bleeding, Incisional warmth, Pus or odor from incision, Fever   Is the patient/caregiver able to teach back steps to recovery at home? Rest and rebuild strength, gradually increase activity, Eat a well-balance diet   If the patient is a current smoker, are they able to teach back resources for cessation? Not  a smoker   Is the patient/caregiver able to teach back the hierarchy of who to call/visit for symptoms/problems? PCP, Specialist, Home health nurse, Urgent Care, ED, 911 Yes   TCM call completed? Yes   Wrap up additional comments Pt states she is doing ok,  denies fever, or increased redness, increased drainage (drainage pump), warmth, swelling at incisional site. Pt verified Post-Op appt on 10/4/22, and plastic surgery appt  9/28/22. RN will send message to PCP office to help pt with hospital fu appt as there are no appts available that satisfy TCM guidelines.          Maria Ines Patricio RN    9/26/2022, 14:38 EDT

## 2022-10-04 ENCOUNTER — OFFICE VISIT (OUTPATIENT)
Dept: SURGERY | Facility: CLINIC | Age: 47
End: 2022-10-04

## 2022-10-04 VITALS
WEIGHT: 207 LBS | OXYGEN SATURATION: 98 % | BODY MASS INDEX: 34.49 KG/M2 | DIASTOLIC BLOOD PRESSURE: 64 MMHG | HEIGHT: 65 IN | SYSTOLIC BLOOD PRESSURE: 106 MMHG | HEART RATE: 69 BPM

## 2022-10-04 DIAGNOSIS — Z78.9 FAMILY HISTORY UNKNOWN: ICD-10-CM

## 2022-10-04 DIAGNOSIS — N64.89 RADIAL SCAR OF RIGHT BREAST: Primary | ICD-10-CM

## 2022-10-04 DIAGNOSIS — D24.2 PAPILLOMA OF LEFT BREAST: ICD-10-CM

## 2022-10-04 DIAGNOSIS — N60.11 FIBROCYSTIC DISEASE OF RIGHT BREAST: ICD-10-CM

## 2022-10-04 DIAGNOSIS — D24.1 PAPILLOMA OF RIGHT BREAST: ICD-10-CM

## 2022-10-04 DIAGNOSIS — D05.12 DUCTAL CARCINOMA IN SITU (DCIS) OF LEFT BREAST: ICD-10-CM

## 2022-10-04 DIAGNOSIS — Z92.29 HISTORY OF HORMONE REPLACEMENT THERAPY: ICD-10-CM

## 2022-10-04 PROCEDURE — 99024 POSTOP FOLLOW-UP VISIT: CPT | Performed by: SURGERY

## 2022-10-04 NOTE — PROGRESS NOTES
Chief Complaint: Ernestina Osuna is a 47 y.o. female who was seen in consultation at the request of ELISEO Chong  for ADH, abnormal breast imaging, newly diagnosed DCIS and a postoperative visit    History of Present Illness:  Patient presents with ADH/DCIS and abnormal imaging and breast mass.     She had a regular screening mammogram in November.  After her mammogram she noticed a single episode of nipple drainage from her left nipple when she reached down to scratch her left breast.  She only saw this 1 time and says that it was clear.  She then sought imaging for this as below.  After having her initial set of images, she noticed a fairly rapid onset burning tenderness to the right nipple area and behind the nipple.  For this we also did diagnostic imaging below.  On the left severe ADH bordering on duct carcinoma in situ was identified at 6:00 and a papilloma identified on MRI at 9:00 to account for her nipple drainage.  On the right at the site of the mass there was a 2 cm simple cyst.  As below.      She noted no new masses, skin changes,other  nipple changes prior to her most recent imaging.  Her most recent imaging includes the followin2020 BILATERAL SCREENING MAMMO WITH PAULA  [facility]        ERNESTINA OSUNA  Heterogeneously dense.  BI-RADS Category 2: Benign.    12/15/2020 LEFT DIAGNOSTIC MMG WITH PAULA & LEFT BREAST US        [facility]       ERNESTINA OSUNA  Single episode of spontaneous clear left nipple discharge. She states her left breast feels like it is on fire.  MMG:  Heterogeneously dense. There are multiple masses and/or ducts in the retroareolar left breast, which are located within the densest area of breast parenchyma.  US:  Left ultrasound, 6:00, 1 cm from the nipple, 0.5 x 0.4 x 0.5 cm complicated cyst versus solid mass, may be contiguous with a duct. Otherwise, numerous benign-appearing cysts and ducts are identified.  Complicated cyst versus solid mass at 6:00 in the left  breast, evaluation with ultrasound-guided core needle biopsy is recommended.  BI-RADS Category 4: Suspicious.      She had a biopsy on the following day that showed:   01/05/2021 LEFT DIAGNOSTIC MAMMO & US GUIDED BREAST BIOPSY      TOYA OSUNA  6:00, 1 cm from the nipple in the left breast, 11 g needle. 11 core specimens were obtained. A bowtie clip was then deployed. Bowtie clip at the expected location in the lower central anterior left breast.  Pathology demonstrates severe atypical ductal hyperplasia bordering on low-grade DCIS, which is concordant.  1. 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  Sections demonstrate and area of possible cyst wall/dilated duct with associated apocrine cysts and proliferative breast changes. Multiple ducts demonstrate areas of micropapillary atypia, which by IHC qualify as at least ADH. Definitive diagnosis is best deferred to the excision.    I then arranged for a MRI:    01/25/2021 BILATERAL BREAST MRI         [facility]           ALFREDO OSUNA  Subareolar right breast, 2.1 cm cyst, which corresponds to the palpable lump.  LEFT BREAST:   6:00, 3.6 cm posterior to the nipple, susceptibility from a biopsy clip, which marks the site of biopsy-proven atypia. No suspicious mass enhancement at this location.   9:00, 3.9 cm posterior to the nipple, there is a 1.2 cm focal area of clumped nonmass enhancement.  IMPRESSION AND RECOMMENDATION:  9:00, left breast. Further evaluation with MRI guided core needle biopsy is recommended.  BI-RADS Category 4: Suspicious.    01/25/2021 RIGHT DIAGNOSTIC MMG WITH PAULA & RIGHT BREAST US    [facility]     ALFREDO OSUNA  CLINICAL INDICATION: Right breast pain and hardening around the right nipple.  MMG:  Heterogeneously dense. There are no suspicious masses, calcifications, or areas of architectural distortion.  US:  Retroareolar right  breast, area of concern, 6:00 there is a palpable 2.0 benign-appearing complicated cyst.  IMPRESSION:  BI-RADS Category 2: Benign.    I then arranged for a MRI guided biopsy:  02/10/2021 MRI BREAST BIOPSY           [facility]   ALFREDO SHOREAS  An 8 gauge Mammotome. 14 core samples were obtained. A U-shaped clip.   Post-procedural mammographic views of the left breast demonstrate the U-shaped clip at the expected location in the inner central middle to anterior left breast, approximately 3.1 cm medial to the bowtie-shaped biopsy clip at the site of biopsy-proven atypia bordering on DCIS.  Pathology is high risk and concordant with the imaging assessment.  PATHOLOGY:  1. Left Breast, 9:00 o’clock, MRI-Guided Biopsies:  A. Proliferative breast parenchyma with sclerotic intraductal papillomas with florid duct hyperplasia, columnar cell hyperplasia and apocrine cysts.  B. Focal microcalcifications associated with columnar cell hyperplasia.  C. No atypical hyperplasia, in-situ nor invasive carcinoma identified.        She had not had a breast biopsy in the past.  She has had her uterus and ovaries removed, is postmenopausal, and has taken combination HRT since 2018  Her family history includes the following: her family is abroad and she does not know her FH.      In the interim, we received the following results:  Multi cancer panel dated February 24, 2021 for [facility] returned as no known mutations.  A variant of uncertain significance was identified in RECQL c.1031 G>A.       Her outside pathology review by Dr Choi returned as :  Low-grade duct carcinoma in situ, micropapillary type, atypical lobular hyperplasia, sclerosing adenosis, intraductal papilloma.         She reports persistent bruising at the LEFT LIQ biopsy site.  She is here to discuss the above and treatment.      Pathology from 3-25-21 LEFT total mastectomy and SLNB with expander reconstruction Dr Casper returned as :  Multiple intraductal papillomas,  sclerosing adenosis, usual hyperplasia, columnar cell change, apocrine cyst, fibroadenomatoid change, negative for malignancy.  0 of 1 sentinel node.  Pathologic stage Tis N0 stage 0.     Dr Fernando called me to comment on the numerous papillomas in the breast.     She will not need radiatio    She denies any redness warmth or drainage from her incision.  She is having only mild discomfort.      July 16, 2021 Dr. Casper left tissue expander exchange for implant and fat grafting with my right mastopexy for symmetry.     Pathology returned as benign unremarkable skin and breast tissue.  February 2, 2022 right diagnostic mammogram with tomosynthesis and right breast ultrasound for palpable right mass for 1 week.  Deep to the area of palpable concern there is question area of architectural distortion near a scar marker.  The mammographic appearance is more conspicuous.   Recommend anterior right breast stereotactic biopsy of the distortion.       On ultrasound, masses at 8:00 and 830 in the right breast recommend 2 site ultrasound-guided biopsy.     Masses at 9:00 in the right breast are probably benign.  If the above pathology results are benign, recommend short-term follow-up right breast ultrasound in 6 months.  BI-RADS 4     4-22- 2022 [facility]      1-  Stereo biopsy right breast architectural distortion  Stereotactic biopsy 8 gauge x6 cores, but bowtie clip was deployed.  Fibroadenomatoid hyperplasia, simple cyst, calcifications, apocrine metaplasia.  2-Ultrasound-guided biopsy 1.9 cm mass at 830, 7 cm in the nipple right breast  Ultrasound-guided biopsy right breast 830, 7 cm from the nipple 10-gauge, 6 specimens, triple twist clip was placed  Usual duct hyperplasia, apocrine metaplasia, fibroadenomatoid hyperplasia, sclerosing adenosis, concordant     3--Ultrasound-guided biopsy of a 7 mm cystic versus solid mass at 8:00, 6 cm from the nipple right breast  Right breast ultrasound-guided biopsy  8:00, 6 cm from the nipple, 10-gauge x4 cores, wing clip was deployed.  Simple cyst, microcalcifications, focal chronic inflammation, fibroadenomatoid hyperplasia     Patient has been on Arimidex since April 2021.  With Dr. Mariano.        Right breast ultrasound August 3, 2022:  Stable appearing benign cyst in the right breast.  Postbiopsy change in the right breast.  BI-RADS 2 routine follow-up mammography    She is here to discuss RIGHT risk reducing mastectomy- the anxiety from the imaging, biopsies and cancer concern is a morbidity for her.  She was very happy with Dr Casper.    Interval History:  9-22-21 pathology returned as RIGHT RRM-benign breast parenchyma with radial scars involved by usual duct hyperplasia, fibroadenomatoid change, apocrine cyst, scattered micro papillomas.  Negative for atypia.    Still has 2 RIGHT drains in place. Denies redness, warmth, drainage from incision.    Review of Systems:  Review of Systems   Constitutional: Negative for unexpected weight change (3 LB WT GAIN ).   Cardiovascular: Palpitations: PALPITATIONS AT NIGHT , BUT NOT OFTEN, CLEARED BY CARDIOLOGY IN [place] AFTER ECHO AND HOLTER MONITOR.   Musculoskeletal: Arthralgias: LEFT KNEE PAIN    All other systems reviewed and are negative.       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 2/10/21 BENIGN.    Breast Cancer HIstory:  Patient does not have a past medical history of breast cancer.  Breast Operations, and year:  NONE   Obstetric/Gynecologic History:  Age menstrual periods began: 12  Patient is postmenopausal due to removal of her uterus in the following year: 43   Number of pregnancies:0  Number of live births: 0  Number of abortions or miscarriages: 0  Age of delivery of first child: N/A   BREAST FEEDING: N/A   Length of time taking birth  control pills: 2 YRS   Patient is presently taking the following hormone replacement:  No longer taking HRT    PATIENT HAD UTERUS AND BOTH OVARIES REMOVED.     Past Surgical History:   Procedure Laterality Date   • AUGMENTATION MAMMAPLASTY     • BILATERAL OOPHORECTOMY     • BREAST BIOPSY Left 01/05/2021   • BREAST RECONSTRUCTION Left 03/25/2021    Procedure: LEFT PLACEMENT TISSUE EXPANDER AND ALLODERM;  Surgeon: Clifford Casper MD;  Location: [facility] OR;  Service: Plastics;  Laterality: Left;   • BREAST RECONSTRUCTION Right 9/22/2022    Procedure: RIGHT PLACEMENT TISSUE EXPANDER AND ALLODERM;  Surgeon: Clifford Casper MD;  Location: [facility] OR;  Service: Plastics;  Laterality: Right;   • BREAST TISSUE EXPANDER REMOVAL INSERTION OF IMPLANT Left 07/16/2021    Procedure: LEFT REMOVAL TISSUE EXPANDER AND PLACEMENT OF IMPLANT;  Surgeon: Clifford Casper MD;  Location: [facility] OR;  Service: Plastics;  Laterality: Left;   • CHOLECYSTECTOMY     • CYSTOSCOPY     • FAT GRAFTING Left 07/16/2021    Procedure: FAT GRAFTING;  Surgeon: Clifford Casper MD;  Location: [facility] OR;  Service: Plastics;  Laterality: Left;   • MASTECTOMY Left    • MASTECTOMY W/ SENTINEL NODE BIOPSY Left 03/25/2021    Procedure: LEFT mastectomy, LEFT axilla sentinel node biopsy,  with or without reconstruction as her surgical treatment.;  Surgeon: Zenia Washington MD;  Location: [facility];  Service: General;  Laterality: Left;   • MASTECTOMY WITH IMMEDIATE RECONSTRUCTION Right 9/22/2022    Procedure: RIGHT risk reducing mastectomy;  Surgeon: Zenia Washington MD;  Location: [facility] OR;  Service: General;  Laterality: Right;   • MASTOPEXY Right 07/16/2021    Procedure: RIGHT MASTOPEXY FOR SYMMETRY;  Surgeon: Clifford Casper MD;  Location: [facility] OR;  Service: Plastics;  Laterality: Right;   • MRI BREAST BIOPSY UNILATERAL Left 02/10/2021   • OOPHORECTOMY     • SUBTOTAL HYSTERECTOMY   "2014, 2015, 2018    Partial in 3 surgeries, but still have cervix       Past Medical History:   Diagnosis Date   • Acid reflux    • Allergic 09/2000    Seasonal allergies   • Breast cancer (HCC)    • Breast cyst    • Cholelithiasis 11/2010    Gallbladet removed in November 2010   • DCIS (ductal carcinoma in situ)     LEFT BREAST   • Drug therapy    • Elevated cholesterol    • Fibrocystic breast    • History of hematuria     MICROSCOPIC   • History of kidney stones    • History of migraine    • Kidney stone 03/2020    [facility]   • Lump of right breast    • Obesity 05/2013    I have been a little overweight since 2013   • Rosacea    • Seasonal allergies        Prior Hospitalizations, other than for surgery or childbirth, and year:  NONE     Social History     Socioeconomic History   • Marital status:    Tobacco Use   • Smoking status: Never Smoker   • Smokeless tobacco: Never Used   Vaping Use   • Vaping Use: Never used   Substance and Sexual Activity   • Alcohol use: Never   • Drug use: Never   • Sexual activity: Not Currently     Partners: Male     Birth control/protection: Abstinence, Surgical     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    Family History:  Family History   Problem Relation Age of Onset   • Heart disease Mother         Triple CABG 03/07/2017   • Hypertension Mother    • Hyperlipidemia Mother    • Hyperlipidemia Father    • Hypertension Father    • Malig Hyperthermia Neg Hx        Vital Signs:  /64   Pulse 69   Ht 165.1 cm (65\")   Wt 93.9 kg (207 lb)   LMP  (LMP Unknown)   SpO2 98%   Breastfeeding No   BMI 34.45 kg/m²      Medications:    Current Outpatient Medications:   •  clotrimazole-betamethasone (LOTRISONE) 1-0.05 % cream, Apply  topically to the appropriate area as directed See Admin Instructions. apply topically to the appropriate area as directed 2 times a day, Disp: 45 each, Rfl: " "11  •  docusate sodium (COLACE) 250 MG capsule, Take 1 capsule by mouth 2 (Two) Times a Day As Needed for Constipation., Disp: 60 capsule, Rfl: 1  •  gabapentin (Neurontin) 100 MG capsule, Take 1 capsule by mouth Every 8 (Eight) Hours., Disp: 60 capsule, Rfl: 2  •  sennosides-docusate (PERICOLACE) 8.6-50 MG per tablet, Take 2 tablets by mouth Daily As Needed for Constipation., Disp: 30 tablet, Rfl: 1  •  sennosides-docusate (PERICOLACE) 8.6-50 MG per tablet, Take 2 tablets by mouth Daily As Needed for Constipation., Disp: 30 tablet, Rfl: 1  •  acetaminophen (TYLENOL) 325 MG tablet, Take 2 tablets by mouth Every 4 (Four) Hours As Needed for Mild Pain., Disp: 60 tablet, Rfl: 0  •  ondansetron ODT (Zofran ODT) 8 MG disintegrating tablet, Place 1 tablet on the tongue Every 8 (Eight) Hours As Needed for Nausea or Vomiting., Disp: 10 tablet, Rfl: 1     Allergies:  No Known Allergies    Physical Examination:  /64   Pulse 69   Ht 165.1 cm (65\")   Wt 93.9 kg (207 lb)   LMP  (LMP Unknown)   SpO2 98%   Breastfeeding No   BMI 34.45 kg/m²   General Appearance:  Patient is in no distress.  She is well kept and has an overweight build.   Psychiatric:  Patient with appropriate mood and affect. Alert and oriented to self, time, and place.    Breast, RIGHT:  Surgically absent with well healing incision. No erythema, warmth, drainage. No skin nodules or discolorations. Expander in place and not expanded.    Breast, LEFT:  Surgically absent with well-healed transverse incision and nipple areolar complex tattooed.  Implant in place.  No skin nodules or discolorations.      Lymphatic:  There is no axillary, cervical, infraclavicular, or supraclavicular adenopathy bilaterally.  Eyes:  Pupils are round and reactive to light.  Cardiovascular:  Heart rate and rhythm are regular.  Respiratory:  Lungs are clear bilaterally with no crackles or wheezes in any lung field.  Gastrointestinal:  Abdomen is soft, nondistended, and " nontender.     Musculoskeletal:  Good strength in all 4 extremities.   There is good range of motion in both shoulders.    Skin:  No new skin lesions or rashes on the skin excluding the breast (see breast exam above).    Imagin2019 BL DIAGNOSTIC MAMMO & RIGHT BREAST US [facility]      ALFREDO OSUNA  Done by [facility].  History: Right upper-outer quadrant palpable lump.  MMG:  Heterogeneously dense. No correlate to the palpable lump.  US:  Right whole breast ultrasound, 9:30 palpable lump corresponds to a 2.4 x 2.2 cm simple cyst. There are multiple additional benign cysts. 2 of the largest are 1.8 cm at periareolar 1:00 and 1.4 cm in the retroareolar breast.  IMPRESSION:  Palpable lump is a cyst.  BI-RADS Category 2: Benign.    10/11/2019 BILATERAL SCREENING MAMMOGRAPHY         [facility]           ALFREDO OSUNA  Heterogeneously dense.  BI-RADS Category 2: Benign.    2020 BILATERAL SCREENING MAMMO WITH PAULA  [facility]        ALFREDO OSUNA  Heterogeneously dense.  BI-RADS Category 2: Benign.    12/15/2020 LEFT DIAGNOSTIC MMG WITH PAULA & LEFT BREAST US        [facility]       ALFREDO OSUNA  Single episode of spontaneous clear left nipple discharge. She states her left breast feels like it is on fire.  MMG:  Heterogeneously dense. There are multiple masses and/or ducts in the retroareolar left breast, which are located within the densest area of breast parenchyma.  US:  Left ultrasound, 6:00, 1 cm from the nipple, 0.5 x 0.4 x 0.5 cm complicated cyst versus solid mass, may be contiguous with a duct. Otherwise, numerous benign-appearing cysts and ducts are identified.  Complicated cyst versus solid mass at 6:00 in the left breast, evaluation with ultrasound-guided core needle biopsy is recommended.  BI-RADS Category 4: Suspicious.    2021 BILATERAL BREAST MRI         [facility]           ALFREDO OSUNA  Subareolar right breast, 2.1 cm cyst, which corresponds to the  palpable lump.  LEFT BREAST:   6:00, 3.6 cm posterior to the nipple, susceptibility from a biopsy clip, which marks the site of biopsy-proven atypia. No suspicious mass enhancement at this location.   9:00, 3.9 cm posterior to the nipple, there is a 1.2 cm focal area of clumped nonmass enhancement.  IMPRESSION AND RECOMMENDATION:  9:00, left breast. Further evaluation with MRI guided core needle biopsy is recommended.  BI-RADS Category 4: Suspicious.    01/25/2021 RIGHT DIAGNOSTIC MMG WITH PAULA & RIGHT BREAST US    [facility]     ALFREDO OSUNA  CLINICAL INDICATION: Right breast pain and hardening around the right nipple.  MMG:  Heterogeneously dense. There are no suspicious masses, calcifications, or areas of architectural distortion.  US:  Retroareolar right breast, area of concern, 6:00 there is a palpable 2.0 benign-appearing complicated cyst.  IMPRESSION:  BI-RADS Category 2: Benign.     On ultrasound, masses at 8:00 and 830 in the right breast recommend 2 site ultrasound-guided biopsy.     Masses at 9:00 in the right breast are probably benign.  If the above pathology results are benign, recommend short-term follow-up right breast ultrasound in 6 months.  BI-RADS 4     Right breast ultrasound August 3, 2022:  Stable appearing benign cyst in the right breast.  Postbiopsy change in the right breast.  BI-RADS 2 routine follow-up mammography.    Pathology:  01/05/2021 LEFT DIAGNOSTIC MAMMO & US GUIDED BREAST BIOPSY      [facility]      ALFREDO OSUNA  6:00, 1 cm from the nipple in the left breast, 11 g needle. 11 core specimens were obtained. A bowtie clip was then deployed. Bowtie clip at the expected location in the lower central anterior left breast.  Pathology demonstrates severe atypical ductal hyperplasia bordering on low-grade DCIS, which is concordant.  1. 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  Sections  demonstrate and area of possible cyst wall/dilated duct with associated apocrine cysts and proliferative breast changes. Multiple ducts demonstrate areas of micropapillary atypia, which by IHC qualify as at least ADH. Definitive diagnosis is best deferred to the excision.    02/10/2021 MRI BREAST BIOPSY           [facility]   ALFREDO OSUNA  An 8 gauge Mammotome. 14 core samples were obtained. A U-shaped clip.   Post-procedural mammographic views of the left breast demonstrate the U-shaped clip at the expected location in the inner central middle to anterior left breast, approximately 3.1 cm medial to the bowtie-shaped biopsy clip at the site of biopsy-proven atypia bordering on DCIS.  Pathology is high risk and concordant with the imaging assessment.  PATHOLOGY:  1. Left Breast, 9:00 o’clock, MRI-Guided Biopsies:  A. Proliferative breast parenchyma with sclerotic intraductal papillomas with florid duct hyperplasia, columnar cell hyperplasia and apocrine cysts.  B. Focal microcalcifications associated with columnar cell hyperplasia.  C. No atypical hyperplasia, in-situ nor invasive carcinoma identified.      Pathology from 3-25-21 LEFT total mastectomy and SLNB with expander reconstruction Dr Casper returned as :  Multiple intraductal papillomas, sclerosing adenosis, usual hyperplasia, columnar cell change, apocrine cyst, fibroadenomatoid change, negative for malignancy.  0 of 1 sentinel node.  Pathologic stage Tis N0 stage 0.     Dr Fernando called me to comment on the numerous papillomas in the breast.            Final Diagnosis    1. Breast, Left, Mastectomy (912.6 grams):               A. Multiple intraductal papillomas, sclerosing adenosis, florid ductal hyperplasia of the usual type, columnar cell         change, clustered apocrine cysts, fibroadenomatoid change and scattered benign microcalcifications.  B. Negative for atypia, in situ and infiltrating carcinoma.     2. Lymph Node [place] #1,  Excision:               A. Reactive lymph node (0/1).     3. Skin, Left Breast, Excision:               A. Benign skin and subcutaneous tissue.      [facility]     Electronically signed by Magdalena Fernando MD on 3/26/2021 at 1541    Synoptic Checklist    DCIS OF THE BREAST: Resection  8th Edition - Protocol posted: 2/26/2020  DCIS OF THE BREAST: COMPLETE EXCISION - All Specimens       SPECIMEN   Procedure   Total mastectomy    Specimen Laterality   Left    TUMOR   Tumor Site   Clock position        6 o'clock    Histologic Type   Ductal carcinoma in situ    Size (Extent) of DCIS   Estimated size (extent) of DCIS is at least (Millimeters): 3 (on core biopsy [id number]) mm   Architectural Patterns   Micropapillary    Nuclear Grade   Grade I (low)    Necrosis   Not identified    Microcalcifications   Present in nonneoplastic tissue    MARGINS   Margins   Uninvolved by DCIS    Distance from Closest Margin (Millimeters)   15 (measured from bowtie clip) mm   Closest Margin(s)   Anterior    LYMPH NODES   Regional Lymph Nodes   Uninvolved by tumor cells    Total Number of Lymph Nodes Examined   1    Number of [place] Nodes Examined   1    PATHOLOGIC STAGE CLASSIFICATION (pTNM, AJCC 8th Edition)       Primary Tumor (pT)   pTis (DCIS)    Regional Lymph Nodes Modifier   (sn): [place] node(s) evaluated    Regional Lymph Nodes (pN)   pN0    SPECIAL STUDIES   Breast Biomarker Testing Performed on Previous Biopsy       Estrogen Receptor (ER) Status   Positive    Percentage of Cells with Nuclear Positivity   %    Breast Biomarker Testing Performed on Previous Biopsy       Progesterone Receptor (PgR) Status   Positive    Percentage of Cells with Nuclear Positivity   51-60%    Testing Performed on Case Number   [id number]    .      Comment      The patient's concurrent left breast core biopsy material is on file with our department ([id number]); slides are pulled and reviewed for comparison.  No discordance is noted.          Selected  slides from this case are shared in internal consultation with Mu Dominguez and Donnell who concur.     Given that there is no definitive residual in situ carcinoma present in mastectomy specimen. The specimen is staged from the previous core where the DCIS measured 3 mm maximally. This case is discussed with Dr. Washington on 3/26/21.     [facility]         Pathology returned as benign unremarkable skin and breast tissue.  February 2, 2022 right diagnostic mammogram with tomosynthesis and right breast ultrasound for palpable right mass for 1 week.  Deep to the area of palpable concern there is question area of architectural distortion near a scar marker.  The mammographic appearance is more conspicuous.   Recommend anterior right breast stereotactic biopsy of the distortion.        4-22- 2022 [facility]      1-  Stereo biopsy right breast architectural distortion  Stereotactic biopsy 8 gauge x6 cores, but bowtie clip was deployed.  Fibroadenomatoid hyperplasia, simple cyst, calcifications, apocrine metaplasia.  2-Ultrasound-guided biopsy 1.9 cm mass at 830, 7 cm in the nipple right breast  Ultrasound-guided biopsy right breast 830, 7 cm from the nipple 10-gauge, 6 specimens, triple twist clip was placed  Usual duct hyperplasia, apocrine metaplasia, fibroadenomatoid hyperplasia, sclerosing adenosis, concordant     3--Ultrasound-guided biopsy of a 7 mm cystic versus solid mass at 8:00, 6 cm from the nipple right breast  Right breast ultrasound-guided biopsy 8:00, 6 cm from the nipple, 10-gauge x4 cores, wing clip was deployed.  Simple cyst, microcalcifications, focal chronic inflammation, fibroadenomatoid hyperplasia    9-22-21 pathology returned as RIGHT RRM-benign breast parenchyma with radial scars involved by usual duct hyperplasia, fibroadenomatoid change, apocrine cyst, scattered micro papillomas.  Negative for atypia.  We will let her know.  Final Diagnosis   1. Right Breast, Simple Skin-Sparing  Mastectomy (1,040 grams):               A. Benign breast parenchyma with radial scars involved by usual ductal hyperplasia, fibroadenomatoid                   change, cluster of apocrine cysts, and scattered micropapillomas.               B. Three clips and biopsy site changes present.               C. Unremarkable skin and nipple.               D. Negative for atypical hyperplasia and malignancy.    Electronically signed by Kathrine Dominguez MD on 9/23/2022 at 1112             Multi cancer panel dated February 24, 2021 for [facility] returned as no known mutations.  A variant of uncertain significance was identified inRECQL c.1031 G>A.          Her outside pathology review by Dr Choi returned as :  Low-grade duct carcinoma in situ, micropapillary type, atypical lobular hyperplasia, sclerosing adenosis, intraductal papilloma.     July 16, 2021 Dr. Csaper left tissue expander exchange for implant and fat grafting with my right mastopexy for symmetry.     Patient has been on Arimidex since April 2021.  With Dr. Mariano.      Procedures:  Ultrasound-guided cyst aspiration 2-12-21  Indication:  symptomatic cyst  Location: RIGHT Breast 6:00 areolar margin  Consent:  The risks, benefits, and alternatives to the procedure were discussed with the patient, who understood and wished to proceed.  The risks described included, but were not limited to, bleeding, infection, pneumothorax, and cyst recurrence requiring percutaneous excisional biopsy.  Description of Procedure:   After the patient was positioned supine on the procedure table, I located the cyst using ultrasound.  I prepped and draped the breast skin in sterile fashion.  I anesthetized the breast skin with 1% lidocaine with epinephrine.  I then anesthetized the underlying subcutaneous tissue and breast parenchyma surrounding the lesion with 1% lidocaine with epinephrine under ultrasound visualization and guidance. I then inserted a 21 G needle (attached to a  syringe) into the cyst under ultrasound guidance and aspirated the cyst fluid until the cyst completely disappeared. The fluid aspirated was typical cyst fluid, nonbloody. 1cc.  The fluid was discarded.  Manual compression was held for 5 minutes and a bandaid applied.  Marker placed: none  Tolerance: The patient tolerated the procedure well.  Disposition: as below    Assessment:   Diagnosis Plan   1. Radial scar of right breast     2. Papilloma of right breast     3. Ductal carcinoma in situ (DCIS) of left breast     4. Fibrocystic disease of right breast     5. Papilloma of left breast     6. Family history unknown     7. History of hormone replacement therapy      1-2  9-22-21 pathology returned as RIGHT RRM-benign breast parenchyma with radial scars involved by usual duct hyperplasia, fibroadenomatoid change, apocrine cyst, scattered micro papillomas.  Negative for atypia.    3-  Left breast 6:00, 1 cm from the nipple, central, 5 mm mass on ultrasound, severe atypical duct hyperplasia bordering on low-grade duct carcinoma in situ, multiple ducts with micropapillary atypical duct hyperplasia at least, recommend excision.  Bowtie marker.  Note that the bowtie marker and the you marker at the site of papilloma are 3.1 cm apart.    TisN0- stage 0    2-20-21-  Her outside pathology review by Dr Choi returned as :  Low-grade duct carcinoma in situ, micropapillary type, atypical lobular hyperplasia, sclerosing adenosis, intraductal papilloma.    Pathology from 3-25-21 LEFT total mastectomy and SLNB with prepectoral expander reconstruction Dr Casper returned as :  Multiple intraductal papillomas, sclerosing adenosis, usual hyperplasia, columnar cell change, apocrine cyst, fibroadenomatoid change, negative for malignancy.  0 of 1 sentinel node.  Pathologic stage Tis N0 stage 0.     Dr Fernando called me to comment on the numerous papillomas in the breast.  Dr. Casper took her for an expander exchange for implant, fat  grafting and right mastopexy July 2021.  Dr. Mariano started Arimidex in April 2021.  Tolerating this well    Multi cancer panel dated February 24, 2021 for [facility] returned as no known mutations.  A variant of uncertain significance was identified in RECQL c.1031 G>A.        4  RIGHT 6:00 areolar margin- 2 cm symptomatic cyst- target for aspiration at initial visit  February 22, 2022 [facility]  1.  Stereotactic biopsy right breast bowtie clip: Fibroadenomatoid hyperplasia, calcifications, apocrine metaplasia.  2.  Ultrasound-guided biopsy right breast 830, 7 cm the nipple: Usual duct hyperplasia, apocrine metaplasia, fibroadenomatoid hyperplasia, sclerosing adenosis.  Triple twist clip.  3.  Ultrasound-guided biopsy 8:00, 6 cm in the nipple right breast  Calcifications, chronic inflammation, fibroadenomatoid hyperplasia.   5-  LEFT 9:00,  4CFN- 1.2 cm enhancement on MRI- U marker- papilloma, concordant      6-  Does not know family history- family is international  Multi cancer panel dated February 24, 2021 for [facility] returned as no known mutations.  A variant of uncertain significance was identified in RECQL c.1031 G>A.     7-  On HRT at presentation, stopped after consultation    Plan:  The patient goes by Ernestina.  She is here with her  today.    She is happy with her pathology report and is coming along with her reconstruction.    Note that the left mastectomy was for DCIS in the right mastectomy was risk reducing.  She did have some high risk lesions on the right and we discussed this today.  I will arrange for her to come back in 6 months with no imaging and see our nurse practitioner.  Thereafter she will see the nurse practitioner for a total of 5 years.  She is not followed by medical oncology or radiation oncology.  We discussed her surveillance, namely that is no further mammography.  Ultrasound for any focal examination findings, MRI if the plastic surgeon Dr. Casper has  concerns about her implants.  As to continue her self breast exam and to call us in the interim with concerns otherwise we will see her back in 6 months with no imaging with our nurse practitioner.          Zenia Washington MD    Next Appointment:  Return in about 6 months (around 4/4/2023) for no imaging, with nurse practitioner.        EMR Dragon/transcription disclaimer:    Much of this encounter note is an electronic transcription/translocation of spoken language to printed text.  The electronic translation of spoken language may permit erroneous, or at times, nonsensical words or phrases to be inadvertently transcribed.  Although I have reviewed the note from such areas, some may still exist.                Answers for HPI/ROS submitted by the patient on 10/2/2022  Please describe your symptoms.: Post-Op  Have you had these symptoms before?: No  How long have you been having these symptoms?: 1-2 weeks  What is the primary reason for your visit?: Other

## 2022-10-28 RX ORDER — ANASTROZOLE 1 MG/1
TABLET ORAL
Refills: 3 | OUTPATIENT
Start: 2022-10-28

## 2022-12-28 ENCOUNTER — OFFICE VISIT (OUTPATIENT)
Dept: FAMILY MEDICINE CLINIC | Facility: CLINIC | Age: 47
End: 2022-12-28

## 2022-12-28 VITALS
DIASTOLIC BLOOD PRESSURE: 72 MMHG | HEIGHT: 65 IN | WEIGHT: 211.2 LBS | BODY MASS INDEX: 35.19 KG/M2 | OXYGEN SATURATION: 99 % | HEART RATE: 75 BPM | SYSTOLIC BLOOD PRESSURE: 124 MMHG | TEMPERATURE: 97.8 F

## 2022-12-28 DIAGNOSIS — R73.03 PREDIABETES: ICD-10-CM

## 2022-12-28 DIAGNOSIS — E55.9 VITAMIN D DEFICIENCY: ICD-10-CM

## 2022-12-28 DIAGNOSIS — N60.92 ATYPICAL DUCTAL HYPERPLASIA OF LEFT BREAST: Primary | ICD-10-CM

## 2022-12-28 DIAGNOSIS — D05.12 BREAST NEOPLASM, TIS (DCIS), LEFT: ICD-10-CM

## 2022-12-28 PROCEDURE — 99214 OFFICE O/P EST MOD 30 MIN: CPT | Performed by: FAMILY MEDICINE

## 2022-12-28 NOTE — PROGRESS NOTES
Subjective   Ernestina Urrutia is a 47 y.o. female.     Chief Complaint   Patient presents with   • labs     PT is concerned with levels.  She states no matter her exercise/diet she feels her levels are still elevated.  She is requesting a lab panel to also check for Vitamin D levels.    • surgery follow up     Right breast mastectomy sx 9/22/22.  She has no issues and feels good.  She has reconstruction appt scheduled for 2/9/2023       History of Present Illness   Patient had a right mastectomy on 9/22.  She has reconstruction scheduled soon.  This was for risk reduction as she had cancer in the left breast.  Patient has had follow-up with her surgeon.  She is due her next imaging around April and has follow-up with her surgeon for that.    Patient is worried for her A1c today.  She is worked hard on diet and exercise and wants to recheck an A1c.  She also wants to recheck her vitamin D levels.  Last A1c several months ago was 6.    The following portions of the patient's history were reviewed and updated as appropriate: allergies, current medications, past family history, past medical history, past social history, past surgical history and problem list.    Past Medical History:   Diagnosis Date   • Acid reflux    • Allergic 09/2000    Seasonal allergies   • Breast cancer (HCC)    • Breast cyst    • Cholelithiasis 11/2010    Gallbladet removed in November 2010   • DCIS (ductal carcinoma in situ)     LEFT BREAST   • Drug therapy    • Elevated cholesterol    • Fibrocystic breast    • History of hematuria     MICROSCOPIC   • History of kidney stones    • History of migraine    • Kidney stone 03/2020    [facility]   • Lump of right breast    • Obesity 05/2013    I have been a little overweight since 2013   • Rosacea    • Seasonal allergies        Past Surgical History:   Procedure Laterality Date   • AUGMENTATION MAMMAPLASTY     • BILATERAL OOPHORECTOMY     • BREAST BIOPSY Left 01/05/2021   •  BREAST RECONSTRUCTION Left 03/25/2021    Procedure: LEFT PLACEMENT TISSUE EXPANDER AND ALLODERM;  Surgeon: Clifford Casper MD;  Location: [facility] MAIN OR;  Service: Plastics;  Laterality: Left;   • BREAST RECONSTRUCTION Right 9/22/2022    Procedure: RIGHT PLACEMENT TISSUE EXPANDER AND ALLODERM;  Surgeon: Clifford Casper MD;  Location: [facility] MAIN OR;  Service: Plastics;  Laterality: Right;   • BREAST TISSUE EXPANDER REMOVAL INSERTION OF IMPLANT Left 07/16/2021    Procedure: LEFT REMOVAL TISSUE EXPANDER AND PLACEMENT OF IMPLANT;  Surgeon: Clifford Casper MD;  Location: [facility] MAIN OR;  Service: Plastics;  Laterality: Left;   • CHOLECYSTECTOMY     • CYSTOSCOPY     • FAT GRAFTING Left 07/16/2021    Procedure: FAT GRAFTING;  Surgeon: Clifford Casper MD;  Location: [facility] MAIN OR;  Service: Plastics;  Laterality: Left;   • MASTECTOMY Left    • MASTECTOMY W/ SENTINEL NODE BIOPSY Left 03/25/2021    Procedure: LEFT mastectomy, LEFT axilla sentinel node biopsy,  with or without reconstruction as her surgical treatment.;  Surgeon: Zenia Washington MD;  Location: [facility] OR;  Service: General;  Laterality: Left;   • MASTECTOMY WITH IMMEDIATE RECONSTRUCTION Right 9/22/2022    Procedure: RIGHT risk reducing mastectomy;  Surgeon: Zenia Washington MD;  Location: [facility] MAIN OR;  Service: General;  Laterality: Right;   • MASTOPEXY Right 07/16/2021    Procedure: RIGHT MASTOPEXY FOR SYMMETRY;  Surgeon: Clifford Casper MD;  Location: [facility] OR;  Service: Plastics;  Laterality: Right;   • MRI BREAST BIOPSY UNILATERAL Left 02/10/2021   • OOPHORECTOMY     • SUBTOTAL HYSTERECTOMY  2014, 2015, 2018    Partial in 3 surgeries, but still have cervix       Family History   Problem Relation Age of Onset   • Heart disease Mother         Triple CABG 03/07/2017   • Hypertension Mother    • Hyperlipidemia Mother         Triple CABG on 03/07/2017   • Hyperlipidemia Father    • Hypertension Father   "  • Malig Hyperthermia Neg Hx        Social History     Socioeconomic History   • Marital status:    Tobacco Use   • Smoking status: Never   • Smokeless tobacco: Never   Vaping Use   • Vaping Use: Never used   Substance and Sexual Activity   • Alcohol use: Never   • Drug use: Never   • Sexual activity: Not Currently     Partners: Male     Birth control/protection: Surgical, Abstinence, Post-menopausal       Review of Systems   Constitutional: Negative for fever.   Respiratory: Negative for shortness of breath.        Objective   Visit Vitals  /72 (BP Location: Left arm, Patient Position: Sitting, Cuff Size: Adult)   Pulse 75   Temp 97.8 °F (36.6 °C) (Temporal)   Ht 165.1 cm (65\")   Wt 95.8 kg (211 lb 3.2 oz)   LMP  (LMP Unknown)   SpO2 99%   BMI 35.15 kg/m²     Body mass index is 35.15 kg/m².  Physical Exam  Constitutional:       Appearance: Normal appearance. She is well-developed.   Cardiovascular:      Rate and Rhythm: Normal rate and regular rhythm.      Heart sounds: Normal heart sounds.   Pulmonary:      Effort: Pulmonary effort is normal.      Breath sounds: Normal breath sounds.   Musculoskeletal:         General: No swelling. Normal range of motion.   Skin:     General: Skin is warm and dry.      Findings: No rash.   Neurological:      General: No focal deficit present.      Mental Status: She is alert and oriented to person, place, and time.   Psychiatric:         Mood and Affect: Mood normal.         Behavior: Behavior normal.           Assessment & Plan   Diagnoses and all orders for this visit:    1. Atypical ductal hyperplasia of left breast (Primary)    2. Breast neoplasm, Tis (DCIS), left    3. Vitamin D deficiency  -     Vitamin D,25-Hydroxy    4. Prediabetes  -     Comprehensive Metabolic Panel  -     Lipid Panel  -     Hemoglobin A1c        She is not taking any vit D right now. Will restart if needed. F/U with breast surgeon as planned. Has a known h/o fatty liver. Would be willing " to try ozempic. Not sure about a statin. F/u in 6 months.

## 2022-12-29 LAB
25(OH)D3+25(OH)D2 SERPL-MCNC: 24.4 NG/ML (ref 30–100)
ALBUMIN SERPL-MCNC: 4.5 G/DL (ref 3.5–5.2)
ALBUMIN/GLOB SERPL: 1.9 G/DL
ALP SERPL-CCNC: 144 U/L (ref 39–117)
ALT SERPL-CCNC: 19 U/L (ref 1–33)
AST SERPL-CCNC: 13 U/L (ref 1–32)
BILIRUB SERPL-MCNC: 0.6 MG/DL (ref 0–1.2)
BUN SERPL-MCNC: 10 MG/DL (ref 6–20)
BUN/CREAT SERPL: 13.5 (ref 7–25)
CALCIUM SERPL-MCNC: 9.5 MG/DL (ref 8.6–10.5)
CHLORIDE SERPL-SCNC: 107 MMOL/L (ref 98–107)
CHOLEST SERPL-MCNC: 229 MG/DL (ref 0–200)
CO2 SERPL-SCNC: 27 MMOL/L (ref 22–29)
CREAT SERPL-MCNC: 0.74 MG/DL (ref 0.57–1)
EGFRCR SERPLBLD CKD-EPI 2021: 100.6 ML/MIN/1.73
GLOBULIN SER CALC-MCNC: 2.4 GM/DL
GLUCOSE SERPL-MCNC: 99 MG/DL (ref 65–99)
HBA1C MFR BLD: 6 % (ref 4.8–5.6)
HDLC SERPL-MCNC: 46 MG/DL (ref 40–60)
LDLC SERPL CALC-MCNC: 159 MG/DL (ref 0–100)
POTASSIUM SERPL-SCNC: 4.4 MMOL/L (ref 3.5–5.2)
PROT SERPL-MCNC: 6.9 G/DL (ref 6–8.5)
SODIUM SERPL-SCNC: 144 MMOL/L (ref 136–145)
TRIGL SERPL-MCNC: 135 MG/DL (ref 0–150)
VLDLC SERPL CALC-MCNC: 24 MG/DL (ref 5–40)

## 2023-01-06 DIAGNOSIS — E78.2 MIXED HYPERLIPIDEMIA: ICD-10-CM

## 2023-01-06 DIAGNOSIS — R73.03 PREDIABETES: Primary | ICD-10-CM

## 2023-01-06 RX ORDER — ATORVASTATIN CALCIUM 10 MG/1
10 TABLET, FILM COATED ORAL DAILY
Qty: 90 TABLET | Refills: 3 | Status: SHIPPED | OUTPATIENT
Start: 2023-01-06

## 2023-01-06 RX ORDER — TIRZEPATIDE 2.5 MG/.5ML
1 INJECTION, SOLUTION SUBCUTANEOUS WEEKLY
Qty: 2 ML | Refills: 0 | Status: SHIPPED | OUTPATIENT
Start: 2023-01-06 | End: 2023-02-08

## 2023-02-08 ENCOUNTER — PRE-ADMISSION TESTING (OUTPATIENT)
Dept: PREADMISSION TESTING | Facility: HOSPITAL | Age: 48
End: 2023-02-08
Payer: COMMERCIAL

## 2023-02-08 VITALS
RESPIRATION RATE: 16 BRPM | OXYGEN SATURATION: 97 % | HEIGHT: 65 IN | DIASTOLIC BLOOD PRESSURE: 90 MMHG | TEMPERATURE: 97.8 F | HEART RATE: 72 BPM | SYSTOLIC BLOOD PRESSURE: 150 MMHG | WEIGHT: 214.6 LBS | BODY MASS INDEX: 35.75 KG/M2

## 2023-02-08 LAB
ANION GAP SERPL CALCULATED.3IONS-SCNC: 8.8 MMOL/L (ref 5–15)
BUN SERPL-MCNC: 13 MG/DL (ref 6–20)
BUN/CREAT SERPL: 18.6 (ref 7–25)
CALCIUM SPEC-SCNC: 9.7 MG/DL (ref 8.6–10.5)
CHLORIDE SERPL-SCNC: 104 MMOL/L (ref 98–107)
CO2 SERPL-SCNC: 28.2 MMOL/L (ref 22–29)
CREAT SERPL-MCNC: 0.7 MG/DL (ref 0.57–1)
DEPRECATED RDW RBC AUTO: 42.5 FL (ref 37–54)
EGFRCR SERPLBLD CKD-EPI 2021: 107.5 ML/MIN/1.73
ERYTHROCYTE [DISTWIDTH] IN BLOOD BY AUTOMATED COUNT: 13.1 % (ref 12.3–15.4)
GLUCOSE SERPL-MCNC: 98 MG/DL (ref 65–99)
HCT VFR BLD AUTO: 44.4 % (ref 34–46.6)
HGB BLD-MCNC: 14 G/DL (ref 12–15.9)
MCH RBC QN AUTO: 28.2 PG (ref 26.6–33)
MCHC RBC AUTO-ENTMCNC: 31.5 G/DL (ref 31.5–35.7)
MCV RBC AUTO: 89.3 FL (ref 79–97)
PLATELET # BLD AUTO: 363 10*3/MM3 (ref 140–450)
PMV BLD AUTO: 10.7 FL (ref 6–12)
POTASSIUM SERPL-SCNC: 3.9 MMOL/L (ref 3.5–5.2)
RBC # BLD AUTO: 4.97 10*6/MM3 (ref 3.77–5.28)
SODIUM SERPL-SCNC: 141 MMOL/L (ref 136–145)
WBC NRBC COR # BLD: 6.93 10*3/MM3 (ref 3.4–10.8)

## 2023-02-08 PROCEDURE — 36415 COLL VENOUS BLD VENIPUNCTURE: CPT

## 2023-02-08 PROCEDURE — 80048 BASIC METABOLIC PNL TOTAL CA: CPT

## 2023-02-08 PROCEDURE — 85027 COMPLETE CBC AUTOMATED: CPT

## 2023-02-08 NOTE — DISCHARGE INSTRUCTIONS
Take the following medications the morning of surgery: NONE      If you are on prescription narcotic pain medication to control your pain you may also take that medication the morning of surgery.    General Instructions:  Do not eat solid food after midnight the night before surgery.  You may drink clear liquids day of surgery but must stop at least one hour before your hospital arrival time.  It is beneficial for you to have a clear drink that contains carbohydrates the day of surgery.  We suggest a 12 to 20 ounce bottle of Gatorade or Powerade for non-diabetic patients or a 12 to 20 ounce bottle of G2 or Powerade Zero for diabetic patients.     Clear liquids are liquids you can see through.  Nothing red in color.     Plain water                               Sports drinks  Sodas                                   Gelatin (Jell-O)  Fruit juices without pulp such as white grape juice and apple juice  Popsicles that contain no fruit or yogurt  Tea or coffee (no cream or milk added)  Gatorade / Powerade  G2 / Powerade Zero    Bring any papers given to you in the doctor’s office.  Wear clean comfortable clothes.  Do not wear contact lenses, false eyelashes or make-up.  Bring a case for your glasses.   Bring crutches or walker if applicable.  Remove all piercings.  Leave jewelry and any other valuables at home.  Hair extensions with metal clips must be removed prior to surgery.  The Pre-Admission Testing nurse will instruct you to bring medications if unable to obtain an accurate list in Pre-Admission Testing.            Preventing a Surgical Site Infection:  For 2 to 3 days before surgery, avoid shaving with a razor because the razor can irritate skin and make it easier to develop an infection.    Any areas of open skin can increase the risk of a post-operative wound infection by allowing bacteria to enter and travel throughout the body.  Notify your surgeon if you have any skin wounds / rashes even if it is not near  the expected surgical site.  The area will need assessed to determine if surgery should be delayed until it is healed.  The night prior to surgery shower using a fresh bar of anti-bacterial soap (such as Dial) and clean washcloth.  Sleep in a clean bed with clean clothing.  Do not allow pets to sleep with you.  Shower on the morning of surgery using a fresh bar of anti-bacterial soap (such as Dial) and clean washcloth.  Dry with a clean towel and dress in clean clothing.  Ask your surgeon if you will be receiving antibiotics prior to surgery.  Make sure you, your family, and all healthcare providers clean their hands with soap and water or an alcohol based hand  before caring for you or your wound.    Day of surgery:  Your arrival time is approximately two hours before your scheduled surgery time.  Upon arrival, a Pre-op nurse and Anesthesiologist will review your health history, obtain vital signs, and answer questions you may have.  The only belongings needed at this time will be a list of your home medications and if applicable your C-PAP/BI-PAP machine.  A Pre-op nurse will start an IV and you may receive medication in preparation for surgery, including something to help you relax.     Please be aware that surgery does come with discomfort.  We want to make every effort to control your discomfort so please discuss any uncontrolled symptoms with your nurse.   Your doctor will most likely have prescribed pain medications.      If you are going home after surgery you will receive individualized written care instructions before being discharged.  A responsible adult must drive you to and from the hospital on the day of your surgery and stay with you for 24 hours.  Discharge prescriptions can be filled by the hospital pharmacy during regular pharmacy hours.  If you are having surgery late in the day/evening your prescription may be e-prescribed to your pharmacy.  Please verify your pharmacy hours or chose a  24 hour pharmacy to avoid not having access to your prescription because your pharmacy has closed for the day.    If you are staying overnight following surgery, you will be transported to your hospital room following the recovery period.  [facility] has all private rooms.    If you have any questions please call Pre-Admission Testing at [phone].  Deductibles and co-payments are collected on the day of service. Please be prepared to pay the required co-pay, deductible or deposit on the day of service as defined by your plan.    Call your surgeon immediately if you experience any of the following symptoms:  Sore Throat  Shortness of Breath or difficulty breathing  Cough  Chills  Body soreness or muscle pain  Headache  Fever  New loss of taste or smell  Do not arrive for your surgery ill.  Your procedure will need to be rescheduled to another time.  You will need to call your physician before the day of surgery to avoid any unnecessary exposure to hospital staff as well as other patients.

## 2023-02-09 ENCOUNTER — ANESTHESIA (OUTPATIENT)
Dept: PERIOP | Facility: HOSPITAL | Age: 48
End: 2023-02-09
Payer: COMMERCIAL

## 2023-02-09 ENCOUNTER — HOSPITAL ENCOUNTER (OUTPATIENT)
Facility: HOSPITAL | Age: 48
Setting detail: HOSPITAL OUTPATIENT SURGERY
Discharge: HOME | End: 2023-02-09
Attending: PLASTIC SURGERY | Admitting: PLASTIC SURGERY
Payer: COMMERCIAL

## 2023-02-09 ENCOUNTER — ANESTHESIA EVENT (OUTPATIENT)
Dept: PERIOP | Facility: HOSPITAL | Age: 48
End: 2023-02-09
Payer: COMMERCIAL

## 2023-02-09 VITALS
HEART RATE: 71 BPM | TEMPERATURE: 97.6 F | DIASTOLIC BLOOD PRESSURE: 78 MMHG | OXYGEN SATURATION: 95 % | SYSTOLIC BLOOD PRESSURE: 124 MMHG | RESPIRATION RATE: 16 BRPM

## 2023-02-09 DIAGNOSIS — R92.8 ABNORMAL FINDING ON BREAST IMAGING: Primary | ICD-10-CM

## 2023-02-09 LAB — QT INTERVAL: 389 MS

## 2023-02-09 PROCEDURE — C1789 PROSTHESIS, BREAST, IMP: HCPCS | Performed by: PLASTIC SURGERY

## 2023-02-09 PROCEDURE — 93005 ELECTROCARDIOGRAM TRACING: CPT | Performed by: ANESTHESIOLOGY

## 2023-02-09 PROCEDURE — 25010000002 DEXAMETHASONE SODIUM PHOSPHATE 20 MG/5ML SOLUTION: Performed by: NURSE ANESTHETIST, CERTIFIED REGISTERED

## 2023-02-09 PROCEDURE — 25010000002 HYDROMORPHONE 1 MG/ML SOLUTION: Performed by: NURSE ANESTHETIST, CERTIFIED REGISTERED

## 2023-02-09 PROCEDURE — 25010000002 GENTAMICIN PER 80 MG: Performed by: PLASTIC SURGERY

## 2023-02-09 PROCEDURE — 93010 ELECTROCARDIOGRAM REPORT: CPT | Performed by: STUDENT IN AN ORGANIZED HEALTH CARE EDUCATION/TRAINING PROGRAM

## 2023-02-09 PROCEDURE — 25010000002 NEOSTIGMINE 5 MG/10ML SOLUTION: Performed by: NURSE ANESTHETIST, CERTIFIED REGISTERED

## 2023-02-09 PROCEDURE — 25010000002 EPINEPHRINE PER 0.1 MG: Performed by: PLASTIC SURGERY

## 2023-02-09 PROCEDURE — 63710000001 ONDANSETRON PER 8 MG: Performed by: PLASTIC SURGERY

## 2023-02-09 PROCEDURE — 25010000002 MIDAZOLAM PER 1 MG: Performed by: ANESTHESIOLOGY

## 2023-02-09 PROCEDURE — 25010000002 FENTANYL CITRATE (PF) 50 MCG/ML SOLUTION: Performed by: NURSE ANESTHETIST, CERTIFIED REGISTERED

## 2023-02-09 PROCEDURE — 0 LIDOCAINE 1 % SOLUTION 20 ML VIAL: Performed by: PLASTIC SURGERY

## 2023-02-09 PROCEDURE — 25010000002 PROPOFOL 10 MG/ML EMULSION: Performed by: NURSE ANESTHETIST, CERTIFIED REGISTERED

## 2023-02-09 PROCEDURE — 25010000002 CEFAZOLIN PER 500 MG: Performed by: PLASTIC SURGERY

## 2023-02-09 DEVICE — IMPLANTABLE DEVICE: Type: IMPLANTABLE DEVICE | Site: BREAST | Status: FUNCTIONAL

## 2023-02-09 RX ORDER — FENTANYL CITRATE 50 UG/ML
50 INJECTION, SOLUTION INTRAMUSCULAR; INTRAVENOUS
Status: DISCONTINUED | OUTPATIENT
Start: 2023-02-09 | End: 2023-02-09 | Stop reason: HOSPADM

## 2023-02-09 RX ORDER — SCOLOPAMINE TRANSDERMAL SYSTEM 1 MG/1
1 PATCH, EXTENDED RELEASE TRANSDERMAL ONCE
Status: DISCONTINUED | OUTPATIENT
Start: 2023-02-09 | End: 2023-02-09 | Stop reason: HOSPADM

## 2023-02-09 RX ORDER — FLUMAZENIL 0.1 MG/ML
0.2 INJECTION INTRAVENOUS AS NEEDED
Status: DISCONTINUED | OUTPATIENT
Start: 2023-02-09 | End: 2023-02-09 | Stop reason: HOSPADM

## 2023-02-09 RX ORDER — EPHEDRINE SULFATE 50 MG/ML
5 INJECTION, SOLUTION INTRAVENOUS ONCE AS NEEDED
Status: DISCONTINUED | OUTPATIENT
Start: 2023-02-09 | End: 2023-02-09 | Stop reason: HOSPADM

## 2023-02-09 RX ORDER — SODIUM CHLORIDE, SODIUM LACTATE, POTASSIUM CHLORIDE, CALCIUM CHLORIDE 600; 310; 30; 20 MG/100ML; MG/100ML; MG/100ML; MG/100ML
125 INJECTION, SOLUTION INTRAVENOUS CONTINUOUS
Status: DISCONTINUED | OUTPATIENT
Start: 2023-02-09 | End: 2023-02-09 | Stop reason: HOSPADM

## 2023-02-09 RX ORDER — OXYCODONE HYDROCHLORIDE 5 MG/1
10 TABLET ORAL ONCE
Status: COMPLETED | OUTPATIENT
Start: 2023-02-09 | End: 2023-02-09

## 2023-02-09 RX ORDER — SODIUM CHLORIDE 0.9 % (FLUSH) 0.9 %
3-10 SYRINGE (ML) INJECTION AS NEEDED
Status: DISCONTINUED | OUTPATIENT
Start: 2023-02-09 | End: 2023-02-09 | Stop reason: HOSPADM

## 2023-02-09 RX ORDER — HYDROMORPHONE HYDROCHLORIDE 1 MG/ML
0.5 INJECTION, SOLUTION INTRAMUSCULAR; INTRAVENOUS; SUBCUTANEOUS
Status: DISCONTINUED | OUTPATIENT
Start: 2023-02-09 | End: 2023-02-09 | Stop reason: HOSPADM

## 2023-02-09 RX ORDER — FENTANYL CITRATE 0.05 MG/ML
INJECTION, SOLUTION INTRAMUSCULAR; INTRAVENOUS AS NEEDED
Status: DISCONTINUED | OUTPATIENT
Start: 2023-02-09 | End: 2023-02-09 | Stop reason: SURG

## 2023-02-09 RX ORDER — DROPERIDOL 2.5 MG/ML
0.62 INJECTION, SOLUTION INTRAMUSCULAR; INTRAVENOUS ONCE AS NEEDED
Status: DISCONTINUED | OUTPATIENT
Start: 2023-02-09 | End: 2023-02-09 | Stop reason: HOSPADM

## 2023-02-09 RX ORDER — OXYCODONE AND ACETAMINOPHEN 7.5; 325 MG/1; MG/1
1 TABLET ORAL EVERY 4 HOURS PRN
Status: DISCONTINUED | OUTPATIENT
Start: 2023-02-09 | End: 2023-02-09 | Stop reason: HOSPADM

## 2023-02-09 RX ORDER — HYDROCODONE BITARTRATE AND ACETAMINOPHEN 5; 325 MG/1; MG/1
1-2 TABLET ORAL EVERY 4 HOURS PRN
Qty: 15 TABLET | Refills: 0 | Status: SHIPPED | OUTPATIENT
Start: 2023-02-09

## 2023-02-09 RX ORDER — PROPOFOL 10 MG/ML
VIAL (ML) INTRAVENOUS AS NEEDED
Status: DISCONTINUED | OUTPATIENT
Start: 2023-02-09 | End: 2023-02-09 | Stop reason: SURG

## 2023-02-09 RX ORDER — DOXYCYCLINE 100 MG/1
200 CAPSULE ORAL ONCE
Status: COMPLETED | OUTPATIENT
Start: 2023-02-09 | End: 2023-02-09

## 2023-02-09 RX ORDER — ONDANSETRON 8 MG/1
8 TABLET, ORALLY DISINTEGRATING ORAL EVERY 8 HOURS PRN
Qty: 10 TABLET | Refills: 1 | Status: SHIPPED | OUTPATIENT
Start: 2023-02-09

## 2023-02-09 RX ORDER — DOXYCYCLINE 100 MG/1
100 CAPSULE ORAL 2 TIMES DAILY
Qty: 10 CAPSULE | Refills: 0 | Status: SHIPPED | OUTPATIENT
Start: 2023-02-09 | End: 2023-02-14

## 2023-02-09 RX ORDER — PROMETHAZINE HYDROCHLORIDE 25 MG/1
25 TABLET ORAL ONCE AS NEEDED
Status: DISCONTINUED | OUTPATIENT
Start: 2023-02-09 | End: 2023-02-09 | Stop reason: HOSPADM

## 2023-02-09 RX ORDER — MIDAZOLAM HYDROCHLORIDE 1 MG/ML
1 INJECTION INTRAMUSCULAR; INTRAVENOUS
Status: DISCONTINUED | OUTPATIENT
Start: 2023-02-09 | End: 2023-02-09 | Stop reason: HOSPADM

## 2023-02-09 RX ORDER — GABAPENTIN 300 MG/1
300 CAPSULE ORAL ONCE
Status: COMPLETED | OUTPATIENT
Start: 2023-02-09 | End: 2023-02-09

## 2023-02-09 RX ORDER — FAMOTIDINE 10 MG/ML
20 INJECTION, SOLUTION INTRAVENOUS ONCE
Status: COMPLETED | OUTPATIENT
Start: 2023-02-09 | End: 2023-02-09

## 2023-02-09 RX ORDER — NALOXONE HCL 0.4 MG/ML
0.2 VIAL (ML) INJECTION AS NEEDED
Status: DISCONTINUED | OUTPATIENT
Start: 2023-02-09 | End: 2023-02-09 | Stop reason: HOSPADM

## 2023-02-09 RX ORDER — ONDANSETRON HYDROCHLORIDE 8 MG/1
8 TABLET, FILM COATED ORAL ONCE
Status: COMPLETED | OUTPATIENT
Start: 2023-02-09 | End: 2023-02-09

## 2023-02-09 RX ORDER — GABAPENTIN 100 MG/1
100 CAPSULE ORAL EVERY 8 HOURS
Qty: 60 CAPSULE | Refills: 2 | Status: SHIPPED | OUTPATIENT
Start: 2023-02-09

## 2023-02-09 RX ORDER — PROMETHAZINE HYDROCHLORIDE 25 MG/1
25 SUPPOSITORY RECTAL ONCE AS NEEDED
Status: DISCONTINUED | OUTPATIENT
Start: 2023-02-09 | End: 2023-02-09 | Stop reason: HOSPADM

## 2023-02-09 RX ORDER — NEOSTIGMINE METHYLSULFATE 0.5 MG/ML
INJECTION, SOLUTION INTRAVENOUS AS NEEDED
Status: DISCONTINUED | OUTPATIENT
Start: 2023-02-09 | End: 2023-02-09 | Stop reason: SURG

## 2023-02-09 RX ORDER — DROPERIDOL 2.5 MG/ML
0.62 INJECTION, SOLUTION INTRAMUSCULAR; INTRAVENOUS
Status: DISCONTINUED | OUTPATIENT
Start: 2023-02-09 | End: 2023-02-09 | Stop reason: HOSPADM

## 2023-02-09 RX ORDER — ROCURONIUM BROMIDE 10 MG/ML
INJECTION, SOLUTION INTRAVENOUS AS NEEDED
Status: DISCONTINUED | OUTPATIENT
Start: 2023-02-09 | End: 2023-02-09 | Stop reason: SURG

## 2023-02-09 RX ORDER — HYDROCODONE BITARTRATE AND ACETAMINOPHEN 5; 325 MG/1; MG/1
1 TABLET ORAL ONCE AS NEEDED
Status: DISCONTINUED | OUTPATIENT
Start: 2023-02-09 | End: 2023-02-09 | Stop reason: HOSPADM

## 2023-02-09 RX ORDER — DEXAMETHASONE SODIUM PHOSPHATE 4 MG/ML
INJECTION, SOLUTION INTRA-ARTICULAR; INTRALESIONAL; INTRAMUSCULAR; INTRAVENOUS; SOFT TISSUE AS NEEDED
Status: DISCONTINUED | OUTPATIENT
Start: 2023-02-09 | End: 2023-02-09 | Stop reason: SURG

## 2023-02-09 RX ORDER — HYDRALAZINE HYDROCHLORIDE 20 MG/ML
5 INJECTION INTRAMUSCULAR; INTRAVENOUS
Status: DISCONTINUED | OUTPATIENT
Start: 2023-02-09 | End: 2023-02-09 | Stop reason: HOSPADM

## 2023-02-09 RX ORDER — DIPHENHYDRAMINE HCL 25 MG
25 CAPSULE ORAL
Status: DISCONTINUED | OUTPATIENT
Start: 2023-02-09 | End: 2023-02-09 | Stop reason: HOSPADM

## 2023-02-09 RX ORDER — ACETAMINOPHEN 325 MG/1
325 TABLET ORAL EVERY 4 HOURS PRN
Qty: 30 TABLET | Refills: 1 | Status: SHIPPED | OUTPATIENT
Start: 2023-02-09

## 2023-02-09 RX ORDER — LIDOCAINE HYDROCHLORIDE 10 MG/ML
0.5 INJECTION, SOLUTION EPIDURAL; INFILTRATION; INTRACAUDAL; PERINEURAL ONCE AS NEEDED
Status: DISCONTINUED | OUTPATIENT
Start: 2023-02-09 | End: 2023-02-09 | Stop reason: HOSPADM

## 2023-02-09 RX ORDER — DOCUSATE SODIUM 250 MG
250 CAPSULE ORAL 2 TIMES DAILY PRN
Qty: 15 CAPSULE | Refills: 1 | Status: SHIPPED | OUTPATIENT
Start: 2023-02-09

## 2023-02-09 RX ORDER — ACETAMINOPHEN 500 MG
1000 TABLET ORAL ONCE
Status: COMPLETED | OUTPATIENT
Start: 2023-02-09 | End: 2023-02-09

## 2023-02-09 RX ORDER — CELECOXIB 200 MG/1
400 CAPSULE ORAL ONCE
Status: COMPLETED | OUTPATIENT
Start: 2023-02-09 | End: 2023-02-09

## 2023-02-09 RX ORDER — SODIUM CHLORIDE 0.9 % (FLUSH) 0.9 %
3 SYRINGE (ML) INJECTION EVERY 12 HOURS SCHEDULED
Status: DISCONTINUED | OUTPATIENT
Start: 2023-02-09 | End: 2023-02-09 | Stop reason: HOSPADM

## 2023-02-09 RX ORDER — HYDROCODONE BITARTRATE AND ACETAMINOPHEN 7.5; 325 MG/1; MG/1
1 TABLET ORAL ONCE AS NEEDED
Status: DISCONTINUED | OUTPATIENT
Start: 2023-02-09 | End: 2023-02-09 | Stop reason: HOSPADM

## 2023-02-09 RX ORDER — ACETAMINOPHEN 500 MG
500 TABLET ORAL EVERY 6 HOURS PRN
Qty: 100 TABLET | Refills: 2 | Status: SHIPPED | OUTPATIENT
Start: 2023-02-09 | End: 2024-02-09

## 2023-02-09 RX ORDER — DIPHENHYDRAMINE HYDROCHLORIDE 50 MG/ML
12.5 INJECTION INTRAMUSCULAR; INTRAVENOUS
Status: DISCONTINUED | OUTPATIENT
Start: 2023-02-09 | End: 2023-02-09 | Stop reason: HOSPADM

## 2023-02-09 RX ORDER — MEPERIDINE HYDROCHLORIDE 25 MG/ML
12.5 INJECTION INTRAMUSCULAR; INTRAVENOUS; SUBCUTANEOUS
Status: DISCONTINUED | OUTPATIENT
Start: 2023-02-09 | End: 2023-02-09 | Stop reason: HOSPADM

## 2023-02-09 RX ORDER — SODIUM CHLORIDE, SODIUM LACTATE, POTASSIUM CHLORIDE, CALCIUM CHLORIDE 600; 310; 30; 20 MG/100ML; MG/100ML; MG/100ML; MG/100ML
9 INJECTION, SOLUTION INTRAVENOUS CONTINUOUS
Status: DISCONTINUED | OUTPATIENT
Start: 2023-02-09 | End: 2023-02-09 | Stop reason: HOSPADM

## 2023-02-09 RX ORDER — LIDOCAINE HYDROCHLORIDE 20 MG/ML
INJECTION, SOLUTION INFILTRATION; PERINEURAL AS NEEDED
Status: DISCONTINUED | OUTPATIENT
Start: 2023-02-09 | End: 2023-02-09 | Stop reason: SURG

## 2023-02-09 RX ORDER — GLYCOPYRROLATE 0.2 MG/ML
INJECTION INTRAMUSCULAR; INTRAVENOUS AS NEEDED
Status: DISCONTINUED | OUTPATIENT
Start: 2023-02-09 | End: 2023-02-09 | Stop reason: SURG

## 2023-02-09 RX ORDER — LABETALOL HYDROCHLORIDE 5 MG/ML
5 INJECTION, SOLUTION INTRAVENOUS
Status: DISCONTINUED | OUTPATIENT
Start: 2023-02-09 | End: 2023-02-09 | Stop reason: HOSPADM

## 2023-02-09 RX ORDER — ONDANSETRON 2 MG/ML
4 INJECTION INTRAMUSCULAR; INTRAVENOUS ONCE AS NEEDED
Status: DISCONTINUED | OUTPATIENT
Start: 2023-02-09 | End: 2023-02-09 | Stop reason: HOSPADM

## 2023-02-09 RX ADMIN — LIDOCAINE HYDROCHLORIDE 60 MG: 20 INJECTION, SOLUTION INFILTRATION; PERINEURAL at 07:09

## 2023-02-09 RX ADMIN — PROPOFOL 200 MG: 10 INJECTION, EMULSION INTRAVENOUS at 07:09

## 2023-02-09 RX ADMIN — NEOSTIGMINE METHYLSULFATE 3 MG: 0.5 INJECTION INTRAVENOUS at 08:53

## 2023-02-09 RX ADMIN — MIDAZOLAM 1 MG: 1 INJECTION INTRAMUSCULAR; INTRAVENOUS at 06:44

## 2023-02-09 RX ADMIN — SODIUM CHLORIDE, POTASSIUM CHLORIDE, SODIUM LACTATE AND CALCIUM CHLORIDE: 600; 310; 30; 20 INJECTION, SOLUTION INTRAVENOUS at 07:00

## 2023-02-09 RX ADMIN — ONDANSETRON HYDROCHLORIDE 8 MG: 8 TABLET, FILM COATED ORAL at 06:06

## 2023-02-09 RX ADMIN — GABAPENTIN 300 MG: 300 CAPSULE ORAL at 06:06

## 2023-02-09 RX ADMIN — HYDROMORPHONE HYDROCHLORIDE 0.5 MG: 1 INJECTION, SOLUTION INTRAMUSCULAR; INTRAVENOUS; SUBCUTANEOUS at 08:04

## 2023-02-09 RX ADMIN — DEXAMETHASONE SODIUM PHOSPHATE 8 MG: 4 INJECTION, SOLUTION INTRAMUSCULAR; INTRAVENOUS at 07:22

## 2023-02-09 RX ADMIN — CELECOXIB 400 MG: 200 CAPSULE ORAL at 06:06

## 2023-02-09 RX ADMIN — ACETAMINOPHEN 1000 MG: 500 TABLET, FILM COATED ORAL at 06:06

## 2023-02-09 RX ADMIN — DOXYCYCLINE 200 MG: 100 CAPSULE ORAL at 06:06

## 2023-02-09 RX ADMIN — ROCURONIUM BROMIDE 50 MG: 10 INJECTION INTRAVENOUS at 07:09

## 2023-02-09 RX ADMIN — SODIUM CHLORIDE, POTASSIUM CHLORIDE, SODIUM LACTATE AND CALCIUM CHLORIDE 125 ML/HR: 600; 310; 30; 20 INJECTION, SOLUTION INTRAVENOUS at 06:08

## 2023-02-09 RX ADMIN — GLYCOPYRROLATE 0.4 MCG: 1 INJECTION INTRAMUSCULAR; INTRAVENOUS at 08:53

## 2023-02-09 RX ADMIN — SCOPALAMINE 1 PATCH: 1 PATCH, EXTENDED RELEASE TRANSDERMAL at 06:43

## 2023-02-09 RX ADMIN — FENTANYL CITRATE 50 MCG: 50 INJECTION INTRAMUSCULAR; INTRAVENOUS at 07:31

## 2023-02-09 RX ADMIN — FAMOTIDINE 20 MG: 10 INJECTION INTRAVENOUS at 06:44

## 2023-02-09 RX ADMIN — OXYCODONE HYDROCHLORIDE 10 MG: 5 TABLET ORAL at 06:06

## 2023-02-09 NOTE — ANESTHESIA POSTPROCEDURE EVALUATION
Patient: Ernestina Urrutia    Procedure Summary     Date: 02/09/23 Room / Location: [facility] OR [address] [place] [address] MAIN OR    Anesthesia Start: 0700 Anesthesia Stop: 0912    Procedures:       RIGHT REMOVAL TISSUE EXPANDER AND PLACEMENT OF IMPLANT (Right: Breast)      AND FAT GRAFTING (Right) Diagnosis:     Surgeons: Clifford Casper MD Provider: Levon Landin MD    Anesthesia Type: general ASA Status: 3          Anesthesia Type: general    Vitals  Vitals Value Taken Time   /87 02/09/23 1001   Temp 36.4 °C (97.6 °F) 02/09/23 0906   Pulse 76 02/09/23 1002   Resp 20 02/09/23 1000   SpO2 93 % 02/09/23 1002   Vitals shown include unvalidated device data.        Post Anesthesia Care and Evaluation    Patient location during evaluation: PACU  Patient participation: complete - patient participated  Level of consciousness: awake and alert  Pain management: adequate    Airway patency: patent  Anesthetic complications: No anesthetic complications    Cardiovascular status: acceptable  Respiratory status: acceptable  Hydration status: acceptable    Comments: --------------------            02/09/23               1015   VSS  --------------------   BP:       143/82     Pulse:      69       Resp:       16       Temp:                SpO2:      100%     --------------------

## 2023-02-09 NOTE — ANESTHESIA PROCEDURE NOTES
Airway  Urgency: elective    Date/Time: 2/9/2023 7:12 AM  Airway not difficult    General Information and Staff    Patient location during procedure: OR  Anesthesiologist: Levon Landin MD  CRNA/CAA: Irina Stewart CRNA    Indications and Patient Condition  Indications for airway management: airway protection    Preoxygenated: yes  MILS maintained throughout  Mask difficulty assessment: 1 - vent by mask    Final Airway Details  Final airway type: endotracheal airway      Successful airway: ETT  Cuffed: yes   Successful intubation technique: direct laryngoscopy  Endotracheal tube insertion site: oral  Blade: Araujo  Blade size: 2  ETT size (mm): 7.0  Cormack-Lehane Classification: grade I - full view of glottis  Placement verified by: chest auscultation and capnometry   Cuff volume (mL): 8  Measured from: lips  ETT/EBT  to lips (cm): 21  Number of attempts at approach: 1  Assessment: lips, teeth, and gum same as pre-op and atraumatic intubation    Additional Comments  Pt preoxygenated, SIVI, bag mask vent, ATETI, dentition as before

## 2023-02-09 NOTE — ANESTHESIA PREPROCEDURE EVALUATION
Anesthesia Evaluation     Patient summary reviewed and Nursing notes reviewed   history of anesthetic complications: PONV               Airway   Mallampati: II  TM distance: >3 FB  Neck ROM: full  No difficulty expected  Dental      Pulmonary - negative pulmonary ROS   Cardiovascular     ECG reviewed  Rhythm: regular  Rate: normal    (+) hyperlipidemia,       Neuro/Psych- negative ROS  GI/Hepatic/Renal/Endo    (+) morbid obesity, GERD,      Musculoskeletal (-) negative ROS    Abdominal    Substance History - negative use     OB/GYN negative ob/gyn ROS         Other      history of cancer                    Anesthesia Plan    ASA 3     general     (BMI    I have reviewed the patient's history with the patient and the chart, including all pertinent laboratory results and imaging. I have explained the risks of anesthesia including but not limited to dental damage, corneal abrasion, nerve injury, MI, stroke, and death. Questions asked and answered. Anesthetic plan discussed with patient and team as indicated. Patient expressed understanding of the above.  )  intravenous induction     Anesthetic plan, risks, benefits, and alternatives have been provided, discussed and informed consent has been obtained with: patient.        CODE STATUS:

## 2023-02-09 NOTE — DISCHARGE INSTRUCTIONS
The following medications were given at 6:06 a.m.:  tylenol, gabapentin, zofran, oxycodone, doxycycline          Scopolamine Patch  This patch has been applied to the skin behind one of your ears.  It may stay in place up to 24 hours. You may remove it at any time after your surgery; however, it should be removed after you are up and walking around the next day.  This medicine reduces stomach upset. Side effects may include: dry mouth, dizziness, sleepiness, constipation, or upset stomach.  An allergy would show up as: a rash, itching, wheezing or shortness of breath.  Follow these instructions:  Do not drink alcohol, drive or operate machinery while taking this medicine.  Wear only 1 patch at a time. You can leave the patch on for up to 24 hours.  When you remove the patch, fold it in half with the sticky sides together and throw it away. Wash your hands and the area under the patch.  Do not touch your eye with your hand if it has touched the patch.  Wash your hands well before and after touching the patch.  Sit or stand slowly to avoid dizziness.  Call your doctor if you have:  Any sign of allergy  No relief  Trouble passing urine  Any new or severe symptoms

## 2023-02-15 NOTE — OP NOTE
Pre-Operative Diagnosis: Acquired absence right breast     Post-Operative Diagnosis: Same     Procedure Performed:   1.  Removal right breast tissue expander placement of permanent implant (allergan ssf-650)  2.  Right breast autologous fat grafting (120cc)     Surgeon: BRIAN Casper MD     Assistant: None     Anesthesia: General     Estimated Blood Loss: 20     Specimens: none     Complications: None     Indications: She has completed her tissue expansion and is ready to proceed with her second stage surgery.  We discussed replacement of the right expander with smooth round silicone gel implant reviewed the consent form for this.  Discussed trying to get as close a match as possible to her existing left reconstruction     We discussed risks, benefits and alternatives including but not limited to: bleeding, infection, asymmetry, poor or slow wound healing, need for further surgery, possible recurrence.  The patient elected to proceed.     Description of Procedure: The patient was met in the preoperative holding area.  All questions were answered and informed consent was assured.       She was marked in a standing position and breast landmarks drawn.  Abdomen was marked for fat harvest.     After induction of appropriate anesthesia, a timeout was performed correctly identifying the patient, operative site, and procedure to be performed.  All present were in agreement.     Local anesthetic was infiltrated and chest and abdomen was prepped and draped in a sterile fashion.  Lateral imf incision made and dissected down to the implant capsule which was significantly elevated to provide a cuff for closure.  There was no periprosthetic fluid. The AlloDerm appeared to be quite well integrated.     A superior capsulotomy performed.     The pocket was then copiously irrigated and immaculate hemostasis achieved.  A sizer was placed and some additional superior and superior lateral capsulotomy was performed.     The  tumescent solution was infiltrated in the abdomen and left dwell for several minutes.  Fat harvest performed with bimanual technique using 3 mm cannulas and harvested with the assistance of the revolve device.  It was rinsed 3 times with warmed lactated Ringer solution and transferred to 10 cc syringes.  It was infiltrated into the upper pole of the left breast to adequately fill out the loss of the upper pole volume from the mastectomy.120cc placed.      The left breast sizer was then removed and the pocket again irrigated and made hemostatic.  50% Betadine solution was left dwell for several minutes and then rinsed clear.  The implant was brought on the field and gloves changed.  The implant was placed into the pocket with a Yusuf funnel and a no touch technique. Closure was performed with 2-0 Vicryl in the superficial fascia, 3-0 Monocryl deep dermal layer and 4 Monocryl in the intracuticular.  Incisions were dressed Dermabond and Tegaderm and she was placed in a well-padded Ace wrap.           The patient was then aroused from anesthesia with ease and transferred to the postoperative care area in good condition. All sponge, needle, and instrument counts were correct.

## 2023-05-01 ENCOUNTER — HOSPITAL ENCOUNTER (EMERGENCY)
Facility: HOSPITAL | Age: 48
Discharge: HOME | End: 2023-05-01
Attending: EMERGENCY MEDICINE
Payer: COMMERCIAL

## 2023-05-01 ENCOUNTER — APPOINTMENT (OUTPATIENT)
Dept: GENERAL RADIOLOGY | Facility: HOSPITAL | Age: 48
End: 2023-05-01
Payer: COMMERCIAL

## 2023-05-01 ENCOUNTER — APPOINTMENT (OUTPATIENT)
Dept: CT IMAGING | Facility: HOSPITAL | Age: 48
End: 2023-05-01
Payer: COMMERCIAL

## 2023-05-01 VITALS
OXYGEN SATURATION: 99 % | TEMPERATURE: 98.2 F | BODY MASS INDEX: 35.94 KG/M2 | WEIGHT: 215.7 LBS | DIASTOLIC BLOOD PRESSURE: 93 MMHG | SYSTOLIC BLOOD PRESSURE: 142 MMHG | HEIGHT: 65 IN | RESPIRATION RATE: 15 BRPM | HEART RATE: 68 BPM

## 2023-05-01 DIAGNOSIS — R42 DIZZINESS: Primary | ICD-10-CM

## 2023-05-01 LAB
ALBUMIN SERPL-MCNC: 4.7 G/DL (ref 3.5–5.2)
ALBUMIN/GLOB SERPL: 1.7 G/DL
ALP SERPL-CCNC: 132 U/L (ref 39–117)
ALT SERPL W P-5'-P-CCNC: 20 U/L (ref 1–33)
ANION GAP SERPL CALCULATED.3IONS-SCNC: 8.9 MMOL/L (ref 5–15)
AST SERPL-CCNC: 13 U/L (ref 1–32)
BASOPHILS # BLD AUTO: 0.02 10*3/MM3 (ref 0–0.2)
BASOPHILS NFR BLD AUTO: 0.3 % (ref 0–1.5)
BILIRUB SERPL-MCNC: 0.7 MG/DL (ref 0–1.2)
BILIRUB UR QL STRIP: NEGATIVE
BUN SERPL-MCNC: 9 MG/DL (ref 6–20)
BUN/CREAT SERPL: 15 (ref 7–25)
CALCIUM SPEC-SCNC: 9.7 MG/DL (ref 8.6–10.5)
CHLORIDE SERPL-SCNC: 105 MMOL/L (ref 98–107)
CLARITY UR: CLEAR
CO2 SERPL-SCNC: 25.1 MMOL/L (ref 22–29)
COLOR UR: YELLOW
CREAT SERPL-MCNC: 0.6 MG/DL (ref 0.57–1)
DEPRECATED RDW RBC AUTO: 43.5 FL (ref 37–54)
EGFRCR SERPLBLD CKD-EPI 2021: 110.9 ML/MIN/1.73
EOSINOPHIL # BLD AUTO: 0.29 10*3/MM3 (ref 0–0.4)
EOSINOPHIL NFR BLD AUTO: 4.2 % (ref 0.3–6.2)
ERYTHROCYTE [DISTWIDTH] IN BLOOD BY AUTOMATED COUNT: 12.9 % (ref 12.3–15.4)
GLOBULIN UR ELPH-MCNC: 2.7 GM/DL
GLUCOSE SERPL-MCNC: 89 MG/DL (ref 65–99)
GLUCOSE UR STRIP-MCNC: NEGATIVE MG/DL
HCT VFR BLD AUTO: 44.3 % (ref 34–46.6)
HGB BLD-MCNC: 15 G/DL (ref 12–15.9)
HGB UR QL STRIP.AUTO: ABNORMAL
HOLD SPECIMEN: NORMAL
HOLD SPECIMEN: NORMAL
IMM GRANULOCYTES # BLD AUTO: 0.01 10*3/MM3 (ref 0–0.05)
IMM GRANULOCYTES NFR BLD AUTO: 0.1 % (ref 0–0.5)
KETONES UR QL STRIP: NEGATIVE
LEUKOCYTE ESTERASE UR QL STRIP.AUTO: NEGATIVE
LYMPHOCYTES # BLD AUTO: 2.29 10*3/MM3 (ref 0.7–3.1)
LYMPHOCYTES NFR BLD AUTO: 33.3 % (ref 19.6–45.3)
MAGNESIUM SERPL-MCNC: 2.5 MG/DL (ref 1.6–2.6)
MCH RBC QN AUTO: 30.7 PG (ref 26.6–33)
MCHC RBC AUTO-ENTMCNC: 33.9 G/DL (ref 31.5–35.7)
MCV RBC AUTO: 90.8 FL (ref 79–97)
MONOCYTES # BLD AUTO: 0.44 10*3/MM3 (ref 0.1–0.9)
MONOCYTES NFR BLD AUTO: 6.4 % (ref 5–12)
NEUTROPHILS NFR BLD AUTO: 3.82 10*3/MM3 (ref 1.7–7)
NEUTROPHILS NFR BLD AUTO: 55.7 % (ref 42.7–76)
NITRITE UR QL STRIP: NEGATIVE
PH UR STRIP.AUTO: 7 [PH] (ref 5–8)
PLATELET # BLD AUTO: 351 10*3/MM3 (ref 140–450)
PMV BLD AUTO: 10.5 FL (ref 6–12)
POTASSIUM SERPL-SCNC: 4 MMOL/L (ref 3.5–5.2)
PROT SERPL-MCNC: 7.4 G/DL (ref 6–8.5)
PROT UR QL STRIP: NEGATIVE
RBC # BLD AUTO: 4.88 10*6/MM3 (ref 3.77–5.28)
SODIUM SERPL-SCNC: 139 MMOL/L (ref 136–145)
SP GR UR STRIP: 1.01 (ref 1–1.03)
TROPONIN T SERPL HS-MCNC: <6 NG/L
UROBILINOGEN UR QL STRIP: ABNORMAL
WBC NRBC COR # BLD: 6.87 10*3/MM3 (ref 3.4–10.8)
WHOLE BLOOD HOLD COAG: NORMAL
WHOLE BLOOD HOLD SPECIMEN: NORMAL

## 2023-05-01 PROCEDURE — 93005 ELECTROCARDIOGRAM TRACING: CPT | Performed by: NURSE PRACTITIONER

## 2023-05-01 PROCEDURE — 99284 EMERGENCY DEPT VISIT MOD MDM: CPT

## 2023-05-01 PROCEDURE — 70450 CT HEAD/BRAIN W/O DYE: CPT

## 2023-05-01 PROCEDURE — 80053 COMPREHEN METABOLIC PANEL: CPT | Performed by: NURSE PRACTITIONER

## 2023-05-01 PROCEDURE — 85025 COMPLETE CBC W/AUTO DIFF WBC: CPT | Performed by: NURSE PRACTITIONER

## 2023-05-01 PROCEDURE — 71045 X-RAY EXAM CHEST 1 VIEW: CPT

## 2023-05-01 PROCEDURE — 93010 ELECTROCARDIOGRAM REPORT: CPT | Performed by: INTERNAL MEDICINE

## 2023-05-01 PROCEDURE — 83735 ASSAY OF MAGNESIUM: CPT | Performed by: NURSE PRACTITIONER

## 2023-05-01 PROCEDURE — 84484 ASSAY OF TROPONIN QUANT: CPT | Performed by: NURSE PRACTITIONER

## 2023-05-01 PROCEDURE — 81003 URINALYSIS AUTO W/O SCOPE: CPT | Performed by: NURSE PRACTITIONER

## 2023-05-01 NOTE — FSED PROVIDER NOTE
"Subjective   History of Present Illness  Patient is a 48-year-old female who presents complaining of single episode of dizziness this morning.  States she was \"feeling weird\" and the feeling woke her up.  When she opened her eyes she said the room was \"spinning really fast\".  She tried to lay back down and that her symptoms lasted \"at least 5 minutes\".  Denies nausea, vomiting, shortness of breath, chest pain.  Denies focal weakness, numbness, tingling.  Denies headache or other vision changes.  Denies prior history of stroke.  Reports she feels fine at this time but would like to be checked out.        Review of Systems   Neurological: Positive for dizziness.   All other systems reviewed and are negative.      Past Medical History:   Diagnosis Date   • Acid reflux     ON OCC   • Breast cancer     DCIS LEFT BREAST.   • DCIS (ductal carcinoma in situ)     LEFT BREAST   • Drug therapy     ORAL MEDICIATION   • Elevated cholesterol     WILL START MEDS AFTER SURGERY   • Fibrocystic breast     STEPHANIE BREAST. BIOPSIES BENIGN IN PAST   • History of hematuria     MICROSCOPIC   • History of kidney stones    • History of migraine    • Obesity 05/2013    I have been a little overweight since 2013   • PONV (postoperative nausea and vomiting)    • Rosacea    • Seasonal allergies        No Known Allergies    Past Surgical History:   Procedure Laterality Date   • BILATERAL OOPHORECTOMY      AT DIFF TIMES   • BREAST BIOPSY Left 01/05/2021   • BREAST RECONSTRUCTION Left 03/25/2021    Procedure: LEFT PLACEMENT TISSUE EXPANDER AND ALLODERM;  Surgeon: Clifford Casper MD;  Location: [facility];  Service: Plastics;  Laterality: Left;   • BREAST RECONSTRUCTION Right 09/22/2022    Procedure: RIGHT PLACEMENT TISSUE EXPANDER AND ALLODERM;  Surgeon: Clifford Casper MD;  Location: [facility] OR;  Service: Plastics;  Laterality: Right;   • BREAST TISSUE EXPANDER REMOVAL INSERTION OF IMPLANT Left 07/16/2021    Procedure: LEFT " REMOVAL TISSUE EXPANDER AND PLACEMENT OF IMPLANT;  Surgeon: Clifford Casper MD;  Location: [facility] MAIN OR;  Service: Plastics;  Laterality: Left;   • BREAST TISSUE EXPANDER REMOVAL INSERTION OF IMPLANT Right 2/9/2023    Procedure: RIGHT REMOVAL TISSUE EXPANDER AND PLACEMENT OF IMPLANT;  Surgeon: Clifford Casper MD;  Location: [facility] MAIN OR;  Service: Plastics;  Laterality: Right;   • CHOLECYSTECTOMY     • CYSTOSCOPY     • FAT GRAFTING Left 07/16/2021    Procedure: FAT GRAFTING;  Surgeon: Clifford Casper MD;  Location: [facility] MAIN OR;  Service: Plastics;  Laterality: Left;   • FAT GRAFTING Right 2/9/2023    Procedure: AND FAT GRAFTING;  Surgeon: Clifford Casper MD;  Location: [facility] OR;  Service: Plastics;  Laterality: Right;   • MASTECTOMY Left    • MASTECTOMY W/ SENTINEL NODE BIOPSY Left 03/25/2021    Procedure: LEFT mastectomy, LEFT axilla sentinel node biopsy,  with or without reconstruction as her surgical treatment.;  Surgeon: Zneia Washington MD;  Location: [facility] OR;  Service: General;  Laterality: Left;   • MASTECTOMY WITH IMMEDIATE RECONSTRUCTION Right 09/22/2022    Procedure: RIGHT risk reducing mastectomy;  Surgeon: Zenia Washington MD;  Location: [facility] OR;  Service: General;  Laterality: Right;   • MASTOPEXY Right 07/16/2021    Procedure: RIGHT MASTOPEXY FOR SYMMETRY;  Surgeon: Clifford Casper MD;  Location: [facility] OR;  Service: Plastics;  Laterality: Right;   • MRI BREAST BIOPSY UNILATERAL Left 02/10/2021   • SUBTOTAL HYSTERECTOMY         Family History   Problem Relation Age of Onset   • Heart disease Mother         Triple CABG 03/07/2017   • Hypertension Mother    • Hyperlipidemia Mother         Triple CABG on 03/07/2017   • Hyperlipidemia Father    • Hypertension Father    • Malig Hyperthermia Neg Hx        Social History     Socioeconomic History   • Marital status:    Tobacco Use   • Smoking status: Never   • Smokeless tobacco:  Never   Vaping Use   • Vaping Use: Never used   Substance and Sexual Activity   • Alcohol use: Never   • Drug use: Never   • Sexual activity: Not Currently     Partners: Male     Birth control/protection: Surgical, Abstinence, Post-menopausal           Objective   Physical Exam  Vitals reviewed.   Constitutional:       Appearance: Normal appearance.   HENT:      Head: Normocephalic and atraumatic.   Eyes:      Extraocular Movements: Extraocular movements intact.   Cardiovascular:      Rate and Rhythm: Normal rate and regular rhythm.      Pulses: Normal pulses.      Heart sounds: Normal heart sounds.   Pulmonary:      Effort: Pulmonary effort is normal.      Breath sounds: Normal breath sounds.   Abdominal:      General: Abdomen is flat.      Palpations: Abdomen is soft.   Musculoskeletal:      Cervical back: Normal range of motion and neck supple.   Skin:     General: Skin is warm and dry.      Capillary Refill: Capillary refill takes less than 2 seconds.   Neurological:      General: No focal deficit present.      Mental Status: She is alert and oriented to person, place, and time.         Procedures           ED Course                                           MDM     Nonfocal neurologic exam.  Laboratory findings reassuring.  EKG normal sinus with a rate of 61.  CT brain and chest x-ray negative for any acute findings.  Low suspicion for CVA.  Patient is to follow-up with her PCP.  Given strict return precautions.    Final diagnoses:   Dizziness       ED Disposition  ED Disposition     ED Disposition   Discharge    Condition   Stable    Comment   --             Cherelle Schumacher MD  [address] [id number] [phone]               Medication List      No changes were made to your prescriptions during this visit.

## 2023-05-01 NOTE — ED NOTES
Patient states that she woke from her sleep at 5am today and felt like the world was spinning around her. Pt denies ever having symptoms like this in the past. States it lasted approx 30 minutes and then resolved. Denies any other symptoms and denies having the sensation at this time. She states she just wants to get checked out.

## 2023-05-02 LAB — QT INTERVAL: 406 MS

## 2023-05-04 ENCOUNTER — TELEPHONE (OUTPATIENT)
Dept: FAMILY MEDICINE CLINIC | Facility: CLINIC | Age: 48
End: 2023-05-04
Payer: COMMERCIAL

## 2023-05-04 NOTE — TELEPHONE ENCOUNTER
Patient called for an ER followup for dizziness and light headedness. I scheduled her for an appointment on Wednesday 5/10, but recommended she go back to urgent care if her symptoms got worse before then.

## 2023-05-10 ENCOUNTER — OFFICE VISIT (OUTPATIENT)
Dept: FAMILY MEDICINE CLINIC | Facility: CLINIC | Age: 48
End: 2023-05-10
Payer: COMMERCIAL

## 2023-05-10 VITALS
BODY MASS INDEX: 35.16 KG/M2 | WEIGHT: 211 LBS | SYSTOLIC BLOOD PRESSURE: 110 MMHG | HEIGHT: 65 IN | OXYGEN SATURATION: 99 % | HEART RATE: 71 BPM | DIASTOLIC BLOOD PRESSURE: 88 MMHG | TEMPERATURE: 98 F

## 2023-05-10 DIAGNOSIS — R73.03 PREDIABETES: ICD-10-CM

## 2023-05-10 DIAGNOSIS — E55.9 VITAMIN D DEFICIENCY: ICD-10-CM

## 2023-05-10 DIAGNOSIS — E78.2 MIXED HYPERLIPIDEMIA: ICD-10-CM

## 2023-05-10 DIAGNOSIS — R42 VERTIGO: Primary | ICD-10-CM

## 2023-05-10 PROCEDURE — 99214 OFFICE O/P EST MOD 30 MIN: CPT | Performed by: FAMILY MEDICINE

## 2023-05-10 RX ORDER — MECLIZINE HYDROCHLORIDE 25 MG/1
25 TABLET ORAL 3 TIMES DAILY PRN
Qty: 90 TABLET | Refills: 1 | Status: SHIPPED | OUTPATIENT
Start: 2023-05-10

## 2023-05-10 NOTE — PROGRESS NOTES
Subjective   Ernestina Urrutia is a 48 y.o. female.     Chief Complaint   Patient presents with   • Follow-up     Went to urgent care dizzy, lightheaded, felt like room was spinning happens in the morning. First happened last Monday 5/1        History of Present Illness   Patient woke up with dizziness and feeling weird.  The room was spinning.  It lasted about 5 minutes.  No nausea vomiting shortness of breath or chest pain.  No numbness or tingling.  No headache.  No history of strokes.  Reviewed ER records.  They noted a nonfocal neurological exam, reassuring laboratory findings, normal EKG, and normal chest x-ray and CT brain. Has had 2 other episodes since then. It only happens first thing in the morning. No head injury. No loc or other precipitating factors.     Pt is fasting and working on diet and exercise, has not started cholesterol med yet.     The following portions of the patient's history were reviewed and updated as appropriate: allergies, current medications, past family history, past medical history, past social history, past surgical history and problem list.    Past Medical History:   Diagnosis Date   • Acid reflux     ON OCC   • Breast cancer     DCIS LEFT BREAST.   • DCIS (ductal carcinoma in situ)     LEFT BREAST   • Drug therapy     ORAL MEDICIATION   • Elevated cholesterol     WILL START MEDS AFTER SURGERY   • Fibrocystic breast     STEPHANIE BREAST. BIOPSIES BENIGN IN PAST   • History of hematuria     MICROSCOPIC   • History of kidney stones    • History of migraine    • Obesity 05/2013    I have been a little overweight since 2013   • PONV (postoperative nausea and vomiting)    • Rosacea    • Seasonal allergies        Past Surgical History:   Procedure Laterality Date   • BILATERAL OOPHORECTOMY      AT DIFF TIMES   • BREAST BIOPSY Left 01/05/2021   • BREAST RECONSTRUCTION Left 03/25/2021    Procedure: LEFT PLACEMENT TISSUE EXPANDER AND ALLODERM;  Surgeon: Clifford Casper MD;  Location:  [facility] MAIN OR;  Service: Plastics;  Laterality: Left;   • BREAST RECONSTRUCTION Right 09/22/2022    Procedure: RIGHT PLACEMENT TISSUE EXPANDER AND ALLODERM;  Surgeon: Clifford Casper MD;  Location: [facility] MAIN OR;  Service: Plastics;  Laterality: Right;   • BREAST TISSUE EXPANDER REMOVAL INSERTION OF IMPLANT Left 07/16/2021    Procedure: LEFT REMOVAL TISSUE EXPANDER AND PLACEMENT OF IMPLANT;  Surgeon: Clifford Casper MD;  Location: [facility] MAIN OR;  Service: Plastics;  Laterality: Left;   • BREAST TISSUE EXPANDER REMOVAL INSERTION OF IMPLANT Right 2/9/2023    Procedure: RIGHT REMOVAL TISSUE EXPANDER AND PLACEMENT OF IMPLANT;  Surgeon: Clifford Casper MD;  Location: [facility] MAIN OR;  Service: Plastics;  Laterality: Right;   • CHOLECYSTECTOMY     • CYSTOSCOPY     • FAT GRAFTING Left 07/16/2021    Procedure: FAT GRAFTING;  Surgeon: Clifford Casper MD;  Location: [facility] MAIN OR;  Service: Plastics;  Laterality: Left;   • FAT GRAFTING Right 2/9/2023    Procedure: AND FAT GRAFTING;  Surgeon: Clifford Casper MD;  Location: [facility] OR;  Service: Plastics;  Laterality: Right;   • MASTECTOMY Left    • MASTECTOMY W/ SENTINEL NODE BIOPSY Left 03/25/2021    Procedure: LEFT mastectomy, LEFT axilla sentinel node biopsy,  with or without reconstruction as her surgical treatment.;  Surgeon: Zenia Washington MD;  Location: [facility] OR;  Service: General;  Laterality: Left;   • MASTECTOMY WITH IMMEDIATE RECONSTRUCTION Right 09/22/2022    Procedure: RIGHT risk reducing mastectomy;  Surgeon: Zenia Washington MD;  Location: [facility] OR;  Service: General;  Laterality: Right;   • MASTOPEXY Right 07/16/2021    Procedure: RIGHT MASTOPEXY FOR SYMMETRY;  Surgeon: Clifford Casper MD;  Location: [facility] OR;  Service: Plastics;  Laterality: Right;   • MRI BREAST BIOPSY UNILATERAL Left 02/10/2021   • SUBTOTAL HYSTERECTOMY         Family History   Problem Relation Age of Onset   •  "Heart disease Mother         Triple CABG 03/07/2017   • Hypertension Mother    • Hyperlipidemia Mother         Triple CABG on 03/07/2017   • Hyperlipidemia Father    • Hypertension Father    • Malig Hyperthermia Neg Hx        Social History     Socioeconomic History   • Marital status:    Tobacco Use   • Smoking status: Never   • Smokeless tobacco: Never   Vaping Use   • Vaping Use: Never used   Substance and Sexual Activity   • Alcohol use: Never   • Drug use: Never   • Sexual activity: Not Currently     Partners: Male     Birth control/protection: Surgical, Abstinence, Post-menopausal       Review of Systems   Respiratory: Negative for shortness of breath.    Cardiovascular: Negative for chest pain and palpitations.       Objective   Visit Vitals  /88 (BP Location: Left arm, Patient Position: Sitting)   Pulse 71   Temp 98 °F (36.7 °C)   Ht 165.1 cm (65\")   Wt 95.7 kg (211 lb)   LMP  (LMP Unknown)   SpO2 99%   BMI 35.11 kg/m²     Body mass index is 35.11 kg/m².  Physical Exam  Constitutional:       Appearance: Normal appearance. She is well-developed.   Cardiovascular:      Rate and Rhythm: Normal rate and regular rhythm.      Heart sounds: Normal heart sounds.   Pulmonary:      Effort: Pulmonary effort is normal.      Breath sounds: Normal breath sounds.   Musculoskeletal:         General: No swelling. Normal range of motion.   Skin:     General: Skin is warm and dry.      Findings: No rash.   Neurological:      General: No focal deficit present.      Mental Status: She is alert and oriented to person, place, and time.   Psychiatric:         Mood and Affect: Mood normal.         Behavior: Behavior normal.           Assessment & Plan   Diagnoses and all orders for this visit:    1. Vertigo (Primary)  -     meclizine (ANTIVERT) 25 MG tablet; Take 1 tablet by mouth 3 (Three) Times a Day As Needed for Dizziness.  Dispense: 90 tablet; Refill: 1  -     Ambulatory Referral to ENT (Otolaryngology)    2. " Vitamin D deficiency  -     Vitamin D,25-Hydroxy    3. Prediabetes  -     Hemoglobin A1c    4. Mixed hyperlipidemia  -     Comprehensive Metabolic Panel  -     Lipid Panel          ER warnings given, f/u as needed. Discussed diet and exercise.

## 2023-05-11 LAB
25(OH)D3+25(OH)D2 SERPL-MCNC: 20.9 NG/ML (ref 30–100)
ALBUMIN SERPL-MCNC: 4.6 G/DL (ref 3.5–5.2)
ALBUMIN/GLOB SERPL: 1.8 G/DL
ALP SERPL-CCNC: 115 U/L (ref 39–117)
ALT SERPL-CCNC: 21 U/L (ref 1–33)
AST SERPL-CCNC: 17 U/L (ref 1–32)
BILIRUB SERPL-MCNC: 0.7 MG/DL (ref 0–1.2)
BUN SERPL-MCNC: 11 MG/DL (ref 6–20)
BUN/CREAT SERPL: 16.9 (ref 7–25)
CALCIUM SERPL-MCNC: 9.9 MG/DL (ref 8.6–10.5)
CHLORIDE SERPL-SCNC: 108 MMOL/L (ref 98–107)
CHOLEST SERPL-MCNC: 212 MG/DL (ref 0–200)
CO2 SERPL-SCNC: 26 MMOL/L (ref 22–29)
CREAT SERPL-MCNC: 0.65 MG/DL (ref 0.57–1)
EGFRCR SERPLBLD CKD-EPI 2021: 108.8 ML/MIN/1.73
GLOBULIN SER CALC-MCNC: 2.5 GM/DL
GLUCOSE SERPL-MCNC: 95 MG/DL (ref 65–99)
HBA1C MFR BLD: 5.9 % (ref 4.8–5.6)
HDLC SERPL-MCNC: 47 MG/DL (ref 40–60)
LDLC SERPL CALC-MCNC: 144 MG/DL (ref 0–100)
POTASSIUM SERPL-SCNC: 4.3 MMOL/L (ref 3.5–5.2)
PROT SERPL-MCNC: 7.1 G/DL (ref 6–8.5)
SODIUM SERPL-SCNC: 143 MMOL/L (ref 136–145)
TRIGL SERPL-MCNC: 116 MG/DL (ref 0–150)
VLDLC SERPL CALC-MCNC: 21 MG/DL (ref 5–40)

## 2023-10-11 ENCOUNTER — TELEPHONE (OUTPATIENT)
Dept: GASTROENTEROLOGY | Facility: CLINIC | Age: 48
End: 2023-10-11
Payer: COMMERCIAL

## 2023-10-11 ENCOUNTER — OFFICE VISIT (OUTPATIENT)
Dept: GASTROENTEROLOGY | Facility: CLINIC | Age: 48
End: 2023-10-11
Payer: COMMERCIAL

## 2023-10-11 VITALS — TEMPERATURE: 96.9 F | WEIGHT: 210.6 LBS | BODY MASS INDEX: 35.09 KG/M2 | HEIGHT: 65 IN

## 2023-10-11 DIAGNOSIS — Z83.719 FAMILY HISTORY OF POLYPS IN THE COLON: ICD-10-CM

## 2023-10-11 DIAGNOSIS — R93.3 ABNORMAL CT SCAN, COLON: Primary | ICD-10-CM

## 2023-10-11 NOTE — PROGRESS NOTES
Chief Complaint   Patient presents with    Abdominal Pain       HPI    Ernestina Urrutia is a  48 y.o. female here establish care as a new patient for abdominal pain.    This patient will also follow with Dr. Santizo.    Past medical history of breast cancer, kidney stones, migraines, obesity along with acid reflux and seasonal allergies.    Patient was referred to us after she was in the emergency room in July (ER evaluation reviewed) when she presented with complaints of abdominal pain.  CT A/P with contrast performed with findings of possible colitis involving the transverse colon adjacent to upper abdominal fat necrosis.  She was discharged on 7 days of Augmentin and referred to our services.    On visit today she reports complete resolution of abdominal pain shortly after treatment with antibiotic therapy.  She is passing formed stool without issue.  Denies diarrhea, constipation, rectal bleeding or rectal pain.    Negative Cologuard 8/2022.  No family history of colon cancer. + Family history of colon polyps in her mother.    No upper GI complaints.  Appetite is good.  Weight is stable.    Past Medical History:   Diagnosis Date    Acid reflux     ON OCC    Breast cancer     DCIS LEFT BREAST.    DCIS (ductal carcinoma in situ)     LEFT BREAST    Drug therapy     ORAL MEDICIATION    Elevated cholesterol     WILL START MEDS AFTER SURGERY    Fatty liver 2022    Fibrocystic breast     STEPHANIE BREAST. BIOPSIES BENIGN IN PAST    History of hematuria     MICROSCOPIC    History of kidney stones     History of migraine     Obesity 05/2013    I have been a little overweight since 2013    PONV (postoperative nausea and vomiting)     Rosacea     Seasonal allergies        Past Surgical History:   Procedure Laterality Date    ABDOMINAL SURGERY  7/2021 & 03/2023    Fat grafting for breast reconstruction    BILATERAL OOPHORECTOMY      AT DIFF TIMES    BREAST BIOPSY Left 01/05/2021    BREAST RECONSTRUCTION Left 03/25/2021     Procedure: LEFT PLACEMENT TISSUE EXPANDER AND ALLODERM;  Surgeon: Clifford Casper MD;  Location: [facility] MAIN OR;  Service: Plastics;  Laterality: Left;    BREAST RECONSTRUCTION Right 09/22/2022    Procedure: RIGHT PLACEMENT TISSUE EXPANDER AND ALLODERM;  Surgeon: Clifford Casper MD;  Location: [facility] MAIN OR;  Service: Plastics;  Laterality: Right;    BREAST TISSUE EXPANDER REMOVAL INSERTION OF IMPLANT Left 07/16/2021    Procedure: LEFT REMOVAL TISSUE EXPANDER AND PLACEMENT OF IMPLANT;  Surgeon: Clifford Casper MD;  Location: [facility] MAIN OR;  Service: Plastics;  Laterality: Left;    BREAST TISSUE EXPANDER REMOVAL INSERTION OF IMPLANT Right 02/09/2023    Procedure: RIGHT REMOVAL TISSUE EXPANDER AND PLACEMENT OF IMPLANT;  Surgeon: Clifford Casper MD;  Location: [facility] MAIN OR;  Service: Plastics;  Laterality: Right;    CHOLECYSTECTOMY      CYSTOSCOPY      FAT GRAFTING Left 07/16/2021    Procedure: FAT GRAFTING;  Surgeon: Clifford Casper MD;  Location: [facility] MAIN OR;  Service: Plastics;  Laterality: Left;    FAT GRAFTING Right 02/09/2023    Procedure: AND FAT GRAFTING;  Surgeon: Clifford Casper MD;  Location: [facility] MAIN OR;  Service: Plastics;  Laterality: Right;    MASTECTOMY Left     MASTECTOMY W/ SENTINEL NODE BIOPSY Left 03/25/2021    Procedure: LEFT mastectomy, LEFT axilla sentinel node biopsy,  with or without reconstruction as her surgical treatment.;  Surgeon: Zenia Washington MD;  Location: [facility] MAIN OR;  Service: General;  Laterality: Left;    MASTECTOMY WITH IMMEDIATE RECONSTRUCTION Right 09/22/2022    Procedure: RIGHT risk reducing mastectomy;  Surgeon: Zenia Washington MD;  Location: [facility] MAIN OR;  Service: General;  Laterality: Right;    MASTOPEXY Right 07/16/2021    Procedure: RIGHT MASTOPEXY FOR SYMMETRY;  Surgeon: Clifford Casper MD;  Location: [facility] MAIN OR;  Service: Plastics;  Laterality: Right;    MRI BREAST BIOPSY UNILATERAL Left  02/10/2021    SUBTOTAL HYSTERECTOMY         Scheduled Meds:     Continuous Infusions: No current facility-administered medications for this visit.      PRN Meds:     No Known Allergies    Social History     Socioeconomic History    Marital status:    Tobacco Use    Smoking status: Never    Smokeless tobacco: Never   Vaping Use    Vaping Use: Never used   Substance and Sexual Activity    Alcohol use: Never    Drug use: Never    Sexual activity: Not Currently     Partners: Male     Birth control/protection: Abstinence, Post-menopausal, Bilateral salpingectomy , Hysterectomy, Surgical       Family History   Problem Relation Age of Onset    Colon polyps Mother     Heart disease Mother         Triple CABG 03/07/2017    Hypertension Mother     Hyperlipidemia Mother         Triple CABG on 03/07/2017    Hyperlipidemia Father     Hypertension Father     Malig Hyperthermia Neg Hx        Review of Systems   Constitutional:  Negative for appetite change and unexpected weight change.   HENT:  Negative for trouble swallowing.    Gastrointestinal: Negative.        Vitals:    10/11/23 1017   Temp: 96.9 øF (36.1 øC)       Physical Exam  Constitutional:       Appearance: She is well-developed.   Abdominal:      General: Bowel sounds are normal. There is no distension.      Palpations: Abdomen is soft. There is no mass.      Tenderness: There is no abdominal tenderness. There is no guarding.      Hernia: No hernia is present.   Skin:     General: Skin is warm and dry.      Capillary Refill: Capillary refill takes less than 2 seconds.   Neurological:      Mental Status: She is alert and oriented to person, place, and time.   Psychiatric:         Behavior: Behavior normal.     Assessment    Diagnoses and all orders for this visit:    1. Abnormal CT scan, colon (Primary)  -     Case Request; Standing  -     Case Request    2. Family history of polyps in the colon  -     Case Request; Standing  -     Case Request        Plan    Tori 48-year-old female seen today to establish care as a new patient referred from the emergency room when she presented with complaints of abdominal pain.  CT findings reviewed with Dr. Santizo as well.  She also has a family history of colon polyps.  As such would recommend proceeding with colonoscopy for further evaluation.  Risk benefits reviewed the patient all questions were answered.  Further recommendations and follow-up pending endoscopic findings.         ELISEO Bullock  [facility], [place], operated by [facility]  [address] [place] [address]  Office: [phone]

## 2023-10-11 NOTE — TELEPHONE ENCOUNTER
Luis lu set up mrs for colonoscopy for Monday 10/16/23 to arrive  at   8:30 my charted her the prep info

## 2023-10-13 ENCOUNTER — PATIENT ROUNDING (BHMG ONLY) (OUTPATIENT)
Dept: GASTROENTEROLOGY | Facility: CLINIC | Age: 48
End: 2023-10-13
Payer: COMMERCIAL

## 2023-10-13 ENCOUNTER — TELEPHONE (OUTPATIENT)
Dept: GASTROENTEROLOGY | Facility: CLINIC | Age: 48
End: 2023-10-13

## 2023-10-13 NOTE — PROGRESS NOTES
October 11, 2023    Hello, may I speak with Ernestina Urrutia?    My name is Deandra      I am  with [facility]  [address] [facility] [address] [place] [id number] [phone].    Before we get started may I verify your date of birth? 1975    I am calling to officially welcome you to our practice and ask about your recent visit. Is this a good time to talk? yes    Tell me about your visit with us. What things went well?  Everything went well and I had a good experience with Margo,        We're always looking for ways to make our patients' experiences even better. Do you have recommendations on ways we may improve?  Yes. The timing of the appointment because the abdominal pain I have started in July and I had to wait until October to be seen. I also was scheduled for my scope on Monday but I am now being told that the doctor is actually on vacation and I can not have a scope until December or 2024 because the 2024 calendar is not out yet to scheduled so now I have to wait another 2 months or more to be scoped.     Overall were you satisfied with your first visit to our practice? yes       I appreciate you taking the time to speak with me today. Is there anything else I can do for you? no      Thank you, and have a great day.

## 2024-03-14 ENCOUNTER — TELEPHONE (OUTPATIENT)
Dept: FAMILY MEDICINE CLINIC | Facility: CLINIC | Age: 49
End: 2024-03-14

## 2024-03-14 NOTE — TELEPHONE ENCOUNTER
Caller: Ernestina Urrutia    Relationship to patient: Self    Best call back number: [phone]     Additional notes:SCHEDULED PHYSICAL APPOINTMENT WITH DIFFERENT PROVIDER DID NOT WANT TO WAIT UNTIL DR. CHAVES HAD AN OPENING

## 2024-03-21 ENCOUNTER — OFFICE VISIT (OUTPATIENT)
Dept: FAMILY MEDICINE CLINIC | Facility: CLINIC | Age: 49
End: 2024-03-21
Payer: COMMERCIAL

## 2024-03-21 VITALS
SYSTOLIC BLOOD PRESSURE: 103 MMHG | WEIGHT: 192 LBS | BODY MASS INDEX: 31.99 KG/M2 | HEIGHT: 65 IN | HEART RATE: 77 BPM | DIASTOLIC BLOOD PRESSURE: 66 MMHG | TEMPERATURE: 97.5 F | OXYGEN SATURATION: 97 %

## 2024-03-21 DIAGNOSIS — E55.9 VITAMIN D DEFICIENCY: ICD-10-CM

## 2024-03-21 DIAGNOSIS — Z00.00 ENCOUNTER FOR ANNUAL PHYSICAL EXAM: Primary | ICD-10-CM

## 2024-03-21 DIAGNOSIS — E78.2 MIXED HYPERLIPIDEMIA: ICD-10-CM

## 2024-03-21 DIAGNOSIS — R73.03 PREDIABETES: ICD-10-CM

## 2024-03-21 RX ORDER — METRONIDAZOLE
POWDER (GRAM) MISCELLANEOUS
COMMUNITY
Start: 2024-02-15

## 2024-03-21 NOTE — PROGRESS NOTES
Chief Complaint  Annual Exam    Subjective        Ernestina Urrutia presents to [facility] for annual exam.    History of Present Illness    BMI 31.95  Currently being seen by the weight loss clinic.  On Semaglutide compounded with b-12 injection.  Wt Readings from Last 3 Encounters:   03/21/24 87.1 kg (192 lb)   10/11/23 95.5 kg (210 lb 9.6 oz)   08/22/23 95.3 kg (210 lb)       HLD  Implemented life style modifications.  Will recheck today, no current medication management.  Lab Results   Component Value Date    CHLPL 212 (H) 05/10/2023    TRIG 116 05/10/2023    HDL 47 05/10/2023     (H) 05/10/2023     Vitamin d def  Chronic, ongoing, well controlled.  Current medication vitamin d supplement.      Lab Results   Component Value Date    HGBA1C 5.90 (H) 05/10/2023       Past Medical History:   Diagnosis Date    Acid reflux     ON OCC    Breast cancer     DCIS LEFT BREAST.    DCIS (ductal carcinoma in situ)     LEFT BREAST    Drug therapy     ORAL MEDICIATION    Elevated cholesterol     WILL START MEDS AFTER SURGERY    Fatty liver 2022    Fibrocystic breast     STEPHANIE BREAST. BIOPSIES BENIGN IN PAST    History of hematuria     MICROSCOPIC    History of kidney stones     History of migraine     Obesity 05/2013    I have been a little overweight since 2013    PONV (postoperative nausea and vomiting)     Rosacea     Seasonal allergies         Past Surgical History:   Procedure Laterality Date    ABDOMINAL SURGERY  7/2021 & 03/2023    Fat grafting for breast reconstruction    BILATERAL OOPHORECTOMY      AT DIFF TIMES    BREAST BIOPSY Left 01/05/2021    BREAST RECONSTRUCTION Left 03/25/2021    Procedure: LEFT PLACEMENT TISSUE EXPANDER AND ALLODERM;  Surgeon: Clifford Casper MD;  Location: [facility];  Service: Plastics;  Laterality: Left;    BREAST RECONSTRUCTION Right 09/22/2022    Procedure: RIGHT PLACEMENT TISSUE EXPANDER AND ALLODERM;  Surgeon: Clifford Casper MD;   Location: [facility] MAIN OR;  Service: Plastics;  Laterality: Right;    BREAST TISSUE EXPANDER REMOVAL INSERTION OF IMPLANT Left 07/16/2021    Procedure: LEFT REMOVAL TISSUE EXPANDER AND PLACEMENT OF IMPLANT;  Surgeon: Clifford Casper MD;  Location: [facility] MAIN OR;  Service: Plastics;  Laterality: Left;    BREAST TISSUE EXPANDER REMOVAL INSERTION OF IMPLANT Right 02/09/2023    Procedure: RIGHT REMOVAL TISSUE EXPANDER AND PLACEMENT OF IMPLANT;  Surgeon: Clifford Casper MD;  Location: [facility] MAIN OR;  Service: Plastics;  Laterality: Right;    CHOLECYSTECTOMY      CYSTOSCOPY      FAT GRAFTING Left 07/16/2021    Procedure: FAT GRAFTING;  Surgeon: Clifford Casper MD;  Location: [facility] MAIN OR;  Service: Plastics;  Laterality: Left;    FAT GRAFTING Right 02/09/2023    Procedure: AND FAT GRAFTING;  Surgeon: Clifford Casper MD;  Location: [facility] MAIN OR;  Service: Plastics;  Laterality: Right;    MASTECTOMY Left     MASTECTOMY W/ SENTINEL NODE BIOPSY Left 03/25/2021    Procedure: LEFT mastectomy, LEFT axilla sentinel node biopsy,  with or without reconstruction as her surgical treatment.;  Surgeon: Zenia Washington MD;  Location: [facility] OR;  Service: General;  Laterality: Left;    MASTECTOMY WITH IMMEDIATE RECONSTRUCTION Right 09/22/2022    Procedure: RIGHT risk reducing mastectomy;  Surgeon: Zenia Washington MD;  Location: [facility] OR;  Service: General;  Laterality: Right;    MASTOPEXY Right 07/16/2021    Procedure: RIGHT MASTOPEXY FOR SYMMETRY;  Surgeon: Clifford Casper MD;  Location: 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  Tobacco use: denies  Alcohol use: denies  Illicit drugs: denies  Dentist: February 2024  Vision: Denies contacts or glasses, last vision exam 09/30/23.              Health Maintenance  Flu denies  Annual: today  Covid: denies  Mammogram; double mastectomy                                   Objective   Vital Signs:  /66 (BP Location: Right arm, Patient Position: Sitting, Cuff Size: Adult)   Pulse 77   Temp 97.5 °F (36.4 °C)   Ht 165.1 cm (65\")   Wt 87.1 kg (192 lb)   SpO2 97%   BMI 31.95 kg/m²   Estimated body mass index is 31.95 kg/m² as calculated from the following:    Height as of this encounter: 165.1 cm (65\").    Weight as of this encounter: 87.1 kg (192 lb).               Physical Exam  Vitals reviewed.   Constitutional:       General: She is not in acute distress.     Appearance: Normal appearance.   HENT:      Head: Normocephalic and atraumatic.      Right Ear: Tympanic membrane normal.      Left Ear: Tympanic membrane normal.      Nose: Nose normal. No congestion.      Mouth/Throat:      Mouth: Mucous membranes are moist.      Pharynx: No oropharyngeal exudate or posterior oropharyngeal erythema.   Eyes:      Conjunctiva/sclera: Conjunctivae normal.      Pupils: Pupils are equal, round, and reactive to light.   Cardiovascular:      Rate and Rhythm: Normal rate and regular rhythm.      Pulses: Normal pulses.      Heart sounds: No murmur heard.     No gallop.   Pulmonary:      Effort: Pulmonary effort is normal. No respiratory distress.      Breath sounds: Normal breath sounds. No wheezing.   Abdominal:      General: Bowel sounds are normal. There is no distension.      Palpations: Abdomen is soft.      Tenderness: There is no abdominal tenderness.   Musculoskeletal:         General: Normal range of motion.      Cervical back: " Normal range of motion and neck supple. No tenderness.      Right lower leg: No edema.      Left lower leg: No edema.   Skin:     General: Skin is warm and dry.   Neurological:      Mental Status: She is alert and oriented to person, place, and time. Mental status is at baseline.   Psychiatric:         Mood and Affect: Mood normal.        Result Review :                     Assessment and Plan     Diagnoses and all orders for this visit:    1. Encounter for annual physical exam (Primary)  -     CBC & Differential  -     Comprehensive Metabolic Panel    2. Vitamin D deficiency  -     Vitamin D,25-Hydroxy    3. Mixed hyperlipidemia  -     Lipid panel    4. Prediabetes  -     Hemoglobin A1c      Will f/u with lab values.   Encourage healthy diet and exercise.  Encourage patient to stay up to date on screening examinations as indicated based on age and risk factors.   F/U yearly.     Follow Up     Return in about 1 year (around 3/21/2025) for Annual physical.  Patient was given instructions and counseling regarding her condition or for health maintenance advice. Please see specific information pulled into the AVS if appropriate.

## 2024-03-22 LAB
25(OH)D3+25(OH)D2 SERPL-MCNC: 29.9 NG/ML (ref 30–100)
ALBUMIN SERPL-MCNC: 4.7 G/DL (ref 3.9–4.9)
ALBUMIN/GLOB SERPL: 1.8 {RATIO} (ref 1.2–2.2)
ALP SERPL-CCNC: 103 IU/L (ref 44–121)
ALT SERPL-CCNC: 21 IU/L (ref 0–32)
AST SERPL-CCNC: 15 IU/L (ref 0–40)
BASOPHILS # BLD AUTO: 0 X10E3/UL (ref 0–0.2)
BASOPHILS NFR BLD AUTO: 0 %
BILIRUB SERPL-MCNC: 0.8 MG/DL (ref 0–1.2)
BUN SERPL-MCNC: 9 MG/DL (ref 6–24)
BUN/CREAT SERPL: 13 (ref 9–23)
CALCIUM SERPL-MCNC: 9.6 MG/DL (ref 8.7–10.2)
CHLORIDE SERPL-SCNC: 102 MMOL/L (ref 96–106)
CHOLEST SERPL-MCNC: 202 MG/DL (ref 100–199)
CO2 SERPL-SCNC: 20 MMOL/L (ref 20–29)
CREAT SERPL-MCNC: 0.69 MG/DL (ref 0.57–1)
EGFRCR SERPLBLD CKD-EPI 2021: 107 ML/MIN/1.73
EOSINOPHIL # BLD AUTO: 0.4 X10E3/UL (ref 0–0.4)
EOSINOPHIL NFR BLD AUTO: 6 %
ERYTHROCYTE [DISTWIDTH] IN BLOOD BY AUTOMATED COUNT: 12.8 % (ref 11.7–15.4)
GLOBULIN SER CALC-MCNC: 2.6 G/DL (ref 1.5–4.5)
GLUCOSE SERPL-MCNC: NORMAL MG/DL
HBA1C MFR BLD: 5.6 % (ref 4.8–5.6)
HCT VFR BLD AUTO: 43.3 % (ref 34–46.6)
HDLC SERPL-MCNC: 44 MG/DL
HGB BLD-MCNC: 14.5 G/DL (ref 11.1–15.9)
IMM GRANULOCYTES # BLD AUTO: 0 X10E3/UL (ref 0–0.1)
IMM GRANULOCYTES NFR BLD AUTO: 0 %
LDLC SERPL CALC-MCNC: 132 MG/DL (ref 0–99)
LYMPHOCYTES # BLD AUTO: 2.2 X10E3/UL (ref 0.7–3.1)
LYMPHOCYTES NFR BLD AUTO: 33 %
MCH RBC QN AUTO: 30.1 PG (ref 26.6–33)
MCHC RBC AUTO-ENTMCNC: 33.5 G/DL (ref 31.5–35.7)
MCV RBC AUTO: 90 FL (ref 79–97)
MONOCYTES # BLD AUTO: 0.4 X10E3/UL (ref 0.1–0.9)
MONOCYTES NFR BLD AUTO: 5 %
NEUTROPHILS # BLD AUTO: 3.8 X10E3/UL (ref 1.4–7)
NEUTROPHILS NFR BLD AUTO: 56 %
PLATELET # BLD AUTO: 348 X10E3/UL (ref 150–450)
POTASSIUM SERPL-SCNC: NORMAL MMOL/L
PROT SERPL-MCNC: 7.3 G/DL (ref 6–8.5)
RBC # BLD AUTO: 4.81 X10E6/UL (ref 3.77–5.28)
SODIUM SERPL-SCNC: 142 MMOL/L (ref 134–144)
TRIGL SERPL-MCNC: 147 MG/DL (ref 0–149)
VLDLC SERPL CALC-MCNC: 26 MG/DL (ref 5–40)
WBC # BLD AUTO: 6.7 X10E3/UL (ref 3.4–10.8)

## 2024-04-30 ENCOUNTER — TELEPHONE (OUTPATIENT)
Dept: GASTROENTEROLOGY | Facility: CLINIC | Age: 49
End: 2024-04-30
Payer: COMMERCIAL

## 2024-04-30 NOTE — TELEPHONE ENCOUNTER
Hub staff attempted to follow warm transfer process and was unsuccessful     Caller: Ernestina Urrutia    Relationship to patient: Self    Best call back number: [phone]    Patient is needing: PT IS CALLING TO SCHEDULE SETUP AN APPT FOR A COLONOSCOPY. PT WAS SUPPOSE TO HAVE ONE LAST YEAR BUT DIDN'T. SHE IS CALLING TO SET IT UP. PLEASE GIVE PT A CALL BACK AND IF NOT ABLE TO REACH PT. IT IS OKAY TO LEAVE AN VOICEMAIL.

## 2024-05-15 ENCOUNTER — TELEPHONE (OUTPATIENT)
Dept: GASTROENTEROLOGY | Facility: CLINIC | Age: 49
End: 2024-05-15
Payer: COMMERCIAL

## 2024-05-15 DIAGNOSIS — Z12.11 ENCOUNTER FOR SCREENING FOR MALIGNANT NEOPLASM OF COLON: Primary | ICD-10-CM

## 2024-05-15 RX ORDER — SODIUM CHLORIDE, SODIUM LACTATE, POTASSIUM CHLORIDE, CALCIUM CHLORIDE 600; 310; 30; 20 MG/100ML; MG/100ML; MG/100ML; MG/100ML
30 INJECTION, SOLUTION INTRAVENOUS CONTINUOUS
OUTPATIENT
Start: 2024-05-15

## 2024-05-15 NOTE — TELEPHONE ENCOUNTER
NO PERSONAL HX OF POLYPS     FAMILY HX OF POLYPS    NO FAMILY HX OF COLON CA    NO ASA OR BLOOD THINNERS        LIST OF  MEDICATIONS    SEMAGLUTIDE W/ B12  VITAMIN D3            OA QUESTIONNAIRE SCANNED IN MEDIA

## 2024-05-16 ENCOUNTER — OFFICE VISIT (OUTPATIENT)
Dept: FAMILY MEDICINE CLINIC | Facility: CLINIC | Age: 49
End: 2024-05-16
Payer: COMMERCIAL

## 2024-05-16 ENCOUNTER — TELEPHONE (OUTPATIENT)
Dept: GASTROENTEROLOGY | Facility: CLINIC | Age: 49
End: 2024-05-16
Payer: COMMERCIAL

## 2024-05-16 VITALS
HEART RATE: 72 BPM | HEIGHT: 65 IN | DIASTOLIC BLOOD PRESSURE: 82 MMHG | SYSTOLIC BLOOD PRESSURE: 122 MMHG | OXYGEN SATURATION: 98 % | BODY MASS INDEX: 31.99 KG/M2 | WEIGHT: 192 LBS

## 2024-05-16 DIAGNOSIS — N20.0 KIDNEY STONE: ICD-10-CM

## 2024-05-16 DIAGNOSIS — R30.9 PAINFUL URINATION: Primary | ICD-10-CM

## 2024-05-16 DIAGNOSIS — N30.01 ACUTE CYSTITIS WITH HEMATURIA: ICD-10-CM

## 2024-05-16 PROBLEM — Z12.11 ENCOUNTER FOR SCREENING FOR MALIGNANT NEOPLASM OF COLON: Status: ACTIVE | Noted: 2024-05-15

## 2024-05-16 LAB
BILIRUB BLD-MCNC: NEGATIVE MG/DL
CLARITY, POC: ABNORMAL
COLOR UR: ABNORMAL
EXPIRATION DATE: ABNORMAL
GLUCOSE UR STRIP-MCNC: NEGATIVE MG/DL
KETONES UR QL: NEGATIVE
LEUKOCYTE EST, POC: ABNORMAL
Lab: ABNORMAL
NITRITE UR-MCNC: NEGATIVE MG/ML
PH UR: 6 [PH] (ref 5–8)
PROT UR STRIP-MCNC: NEGATIVE MG/DL
RBC # UR STRIP: ABNORMAL /UL
SP GR UR: 1.02 (ref 1–1.03)
UROBILINOGEN UR QL: NORMAL

## 2024-05-16 PROCEDURE — 81003 URINALYSIS AUTO W/O SCOPE: CPT

## 2024-05-16 PROCEDURE — 99213 OFFICE O/P EST LOW 20 MIN: CPT

## 2024-05-16 RX ORDER — SULFAMETHOXAZOLE AND TRIMETHOPRIM 800; 160 MG/1; MG/1
1 TABLET ORAL 2 TIMES DAILY
Qty: 20 TABLET | Refills: 0 | Status: SHIPPED | OUTPATIENT
Start: 2024-05-16 | End: 2024-05-26

## 2024-05-16 RX ORDER — PHENAZOPYRIDINE HYDROCHLORIDE 200 MG/1
200 TABLET, FILM COATED ORAL 3 TIMES DAILY PRN
Qty: 6 TABLET | Refills: 0 | Status: SHIPPED | OUTPATIENT
Start: 2024-05-16 | End: 2024-05-18

## 2024-05-16 NOTE — TELEPHONE ENCOUNTER
SHADI Dsouza for COLONOSCOPY on 9/12/2024  arrive at 10;00  . Sent prep instructions to pt my chart....miralax

## 2024-05-16 NOTE — PROGRESS NOTES
"Chief Complaint  Difficulty Urinating (Hx of kidney stones /Seen urologist last october)    Subjective          History of Present Illness    Difficulty urinating  Pain in lower bladder radiating to back right flank.  Ongoing since Sunday.  Treatment: denies   Worse in the AM  Cystoscopy with urology in October, normal, no need to follow up.  Decreased urine output, increased pain, feels worse than her regular UTI, worried she has stones again.    Brief Urine Lab Results  (Last result in the past 365 days)        Color   Clarity   Blood   Leuk Est   Nitrite   Protein   CREAT   Urine HCG        05/16/24 0931 Dark Yellow   Hazy   3+   500 Veronica/ul   Negative   Negative                   CT abdomen/pelvis 07/11/23  IMPRESSION:  Stranding of the upper abdominal fat with colitis involving  the transverse colon. These findings could be secondary to a primary  colitis or secondary colitis secondary to adjacent fat necrosis.  Correlate with history and physical exam.    Objective   Vital Signs:  /82   Pulse 72   Ht 165.1 cm (65\")   Wt 87.1 kg (192 lb)   SpO2 98%   BMI 31.95 kg/m²   Estimated body mass index is 31.95 kg/m² as calculated from the following:    Height as of this encounter: 165.1 cm (65\").    Weight as of this encounter: 87.1 kg (192 lb).               Physical Exam  Vitals reviewed.   Constitutional:       General: She is not in acute distress.     Appearance: Normal appearance.   HENT:      Head: Normocephalic and atraumatic.   Eyes:      Conjunctiva/sclera: Conjunctivae normal.      Pupils: Pupils are equal, round, and reactive to light.   Cardiovascular:      Rate and Rhythm: Normal rate and regular rhythm.      Pulses: Normal pulses.      Heart sounds: No murmur heard.     No gallop.   Pulmonary:      Effort: Pulmonary effort is normal. No respiratory distress.      Breath sounds: Normal breath sounds. No wheezing.   Abdominal:      General: Bowel sounds are normal. There is no distension.      " Palpations: Abdomen is soft.      Tenderness: There is no abdominal tenderness. There is right CVA tenderness.   Musculoskeletal:         General: Normal range of motion.      Cervical back: Normal range of motion and neck supple. No tenderness.   Skin:     General: Skin is warm and dry.   Neurological:      Mental Status: She is alert and oriented to person, place, and time. Mental status is at baseline.   Psychiatric:         Mood and Affect: Mood normal.        Result Review :                     Assessment and Plan     Diagnoses and all orders for this visit:    1. Painful urination (Primary)  -     POCT urinalysis dipstick, automated  -     Urine Culture - Urine, Urine, Clean Catch; Future  -     Urine Culture - Urine, Urine, Clean Catch  -     sulfamethoxazole-trimethoprim (Bactrim DS) 800-160 MG per tablet; Take 1 tablet by mouth 2 (Two) Times a Day for 10 days.  Dispense: 20 tablet; Refill: 0  -     phenazopyridine (Pyridium) 200 MG tablet; Take 1 tablet by mouth 3 (Three) Times a Day As Needed for Bladder Spasms for up to 2 days.  Dispense: 6 tablet; Refill: 0    2. Kidney stone  -     sulfamethoxazole-trimethoprim (Bactrim DS) 800-160 MG per tablet; Take 1 tablet by mouth 2 (Two) Times a Day for 10 days.  Dispense: 20 tablet; Refill: 0  -     CT Abdomen Pelvis With & Without Contrast; Future    3. Acute cystitis with hematuria  -     sulfamethoxazole-trimethoprim (Bactrim DS) 800-160 MG per tablet; Take 1 tablet by mouth 2 (Two) Times a Day for 10 days.  Dispense: 20 tablet; Refill: 0  -     CT Abdomen Pelvis With & Without Contrast; Future      UA positive for leukocytes, blood, proteins.  Will send for urine culture, follow-up with results.  Patient concerned of kidney stones due to increased pain, inability to empty bladder completely, would like to get CT scan done ASAP.  Referral team will set this up.       Follow Up     Return if symptoms worsen or fail to improve.  Patient was given instructions  and counseling regarding her condition or for health maintenance advice. Please see specific information pulled into the AVS if appropriate.

## 2024-05-17 ENCOUNTER — HOSPITAL ENCOUNTER (OUTPATIENT)
Dept: CT IMAGING | Facility: HOSPITAL | Age: 49
Discharge: HOME | End: 2024-05-17
Payer: COMMERCIAL

## 2024-05-17 DIAGNOSIS — N20.0 KIDNEY STONE: ICD-10-CM

## 2024-05-17 DIAGNOSIS — N30.01 ACUTE CYSTITIS WITH HEMATURIA: ICD-10-CM

## 2024-05-17 PROCEDURE — 74178 CT ABD&PLV WO CNTR FLWD CNTR: CPT

## 2024-05-17 PROCEDURE — 25510000001 IOPAMIDOL 61 % SOLUTION

## 2024-05-17 PROCEDURE — 0 DIATRIZOATE MEGLUMINE & SODIUM PER 1 ML

## 2024-05-17 RX ADMIN — IOPAMIDOL 85 ML: 612 INJECTION, SOLUTION INTRAVENOUS at 12:03

## 2024-05-17 RX ADMIN — DIATRIZOATE MEGLUMINE AND DIATRIZOATE SODIUM 30 ML: 660; 100 LIQUID ORAL; RECTAL at 11:03

## 2024-05-17 NOTE — NURSING NOTE
1212 Pt arrived in radiology triage for Stat Hold and Call.     1319 I called Jaceygrecia GOMES and she said patient may go home. I removed her IV and directed her to Entrance A to exit.

## 2024-05-21 LAB
BACTERIA UR CULT: ABNORMAL
BACTERIA UR CULT: ABNORMAL
OTHER ANTIBIOTIC SUSC ISLT: ABNORMAL

## 2024-08-01 NOTE — PROGRESS NOTES
Chief Complaint: Ernestina Osuna is a 49 y.o. female who was seen in consultation at the request of ELISEO Chong  for  ADH, abnormal breast imaging, newly diagnosed DCIS and a postoperative visit    History of Present Illness:  2021  Patient presents with  ADH/DCIS and abnormal imaging and breast mass .     She had a regular screening mammogram in November.  After her mammogram she noticed a single episode of nipple drainage from her left nipple when she reached down to scratch her left breast.  She only saw this 1 time and says that it was clear.  She then sought imaging for this as below.  After having her initial set of images, she noticed a fairly rapid onset burning tenderness to the right nipple area and behind the nipple.  For this we also did diagnostic imaging below.  On the left severe ADH bordering on duct carcinoma in situ was identified at 6:00 and a papilloma identified on MRI at 9:00 to account for her nipple drainage.  On the right at the site of the mass there was a 2 cm simple cyst.  As below.      She noted no new masses, skin changes,other  nipple changes prior to her most recent imaging.  Her most recent imaging includes the followin2020 BILATERAL SCREENING MAMMO WITH PAULA  [facility]        ERNESTINA OSUNA  Heterogeneously dense.  BI-RADS Category 2: Benign.    12/15/2020 LEFT DIAGNOSTIC MMG WITH PAULA & LEFT BREAST US        [facility]       ERNESTINA OSUNA  Single episode of spontaneous clear left nipple discharge. She states her left breast feels like it is on fire.  MMG:  Heterogeneously dense. There are multiple masses and/or ducts in the retroareolar left breast, which are located within the densest area of breast parenchyma.  US:  Left ultrasound, 6:00, 1 cm from the nipple, 0.5 x 0.4 x 0.5 cm complicated cyst versus solid mass, may be contiguous with a duct. Otherwise, numerous benign-appearing cysts and ducts are identified.  Complicated cyst versus solid mass at  6:00 in the left breast, evaluation with ultrasound-guided core needle biopsy is recommended.  BI-RADS Category 4: Suspicious.      She had a biopsy on the following day that showed:   01/05/2021 LEFT DIAGNOSTIC MAMMO & US GUIDED BREAST BIOPSY      [facility]      ALFREDO OSUNA  6:00, 1 cm from the nipple in the left breast, 11 g needle. 11 core specimens were obtained. A bowtie clip was then deployed. Bowtie clip at the expected location in the lower central anterior left breast.  Pathology demonstrates severe atypical ductal hyperplasia bordering on low-grade DCIS, which is concordant.  1. 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  Sections demonstrate and area of possible cyst wall/dilated duct with associated apocrine cysts and proliferative breast changes. Multiple ducts demonstrate areas of micropapillary atypia, which by IHC qualify as at least ADH. Definitive diagnosis is best deferred to the excision.    I then arranged for a MRI:    01/25/2021 BILATERAL BREAST MRI         [facility]           ALFREDO OSUNA  Subareolar right breast, 2.1 cm cyst, which corresponds to the palpable lump.  LEFT BREAST:   6:00, 3.6 cm posterior to the nipple, susceptibility from a biopsy clip, which marks the site of biopsy-proven atypia. No suspicious mass enhancement at this location.   9:00, 3.9 cm posterior to the nipple, there is a 1.2 cm focal area of clumped nonmass enhancement.  IMPRESSION AND RECOMMENDATION:  9:00, left breast. Further evaluation with MRI guided core needle biopsy is recommended.  BI-RADS Category 4: Suspicious.    01/25/2021 RIGHT DIAGNOSTIC MMG WITH PAULA & RIGHT BREAST US    [facility]     ALFREDO OSUNA  CLINICAL INDICATION: Right breast pain and hardening around the right nipple.  MMG:  Heterogeneously dense. There are no suspicious masses, calcifications, or areas of architectural  distortion.  US:  Retroareolar right breast, area of concern, 6:00 there is a palpable 2.0 benign-appearing complicated cyst.  IMPRESSION:  BI-RADS Category 2: Benign.    I then arranged for a MRI guided biopsy:  02/10/2021 MRI BREAST BIOPSY           [facility]   ALFREDO SHOREAS  An 8 gauge Mammotome. 14 core samples were obtained. A U-shaped clip.   Post-procedural mammographic views of the left breast demonstrate the U-shaped clip at the expected location in the inner central middle to anterior left breast, approximately 3.1 cm medial to the bowtie-shaped biopsy clip at the site of biopsy-proven atypia bordering on DCIS.  Pathology is high risk and concordant with the imaging assessment.  PATHOLOGY:  1. Left Breast, 9:00 o’clock, MRI-Guided Biopsies:  A. Proliferative breast parenchyma with sclerotic intraductal papillomas with florid duct hyperplasia, columnar cell hyperplasia and apocrine cysts.  B. Focal microcalcifications associated with columnar cell hyperplasia.  C. No atypical hyperplasia, in-situ nor invasive carcinoma identified.        She had not had a breast biopsy in the past.  She has had her uterus and ovaries removed, is postmenopausal, and has taken combination HRT since 2018  Her family history includes the following: her family is abroad and she does not know her FH.    3/4/2021  In the interim, we received the following results:  Multi cancer panel dated February 24, 2021 for [facility] returned as no known mutations.  A variant of uncertain significance was identified in RECQL c.1031 G>A.       Her outside pathology review by Dr Choi returned as :  Low-grade duct carcinoma in situ, micropapillary type, atypical lobular hyperplasia, sclerosing adenosis, intraductal papilloma.         She reports persistent bruising at the LEFT LIQ biopsy site.  She is here to discuss the above and treatment.    4/9/2021  Pathology from 3-25-21 LEFT total mastectomy and SLNB with expander reconstruction   Pennie returned as :  Multiple intraductal papillomas, sclerosing adenosis, usual hyperplasia, columnar cell change, apocrine cyst, fibroadenomatoid change, negative for malignancy.  0 of 1 sentinel node.  Pathologic stage Tis N0 stage 0.     Dr Fernando called me to comment on the numerous papillomas in the breast.     She will not need radiatio    She denies any redness warmth or drainage from her incision.  She is having only mild discomfort.      8/17/2022 July 16, 2021 Dr. Casper left tissue expander exchange for implant and fat grafting with my right mastopexy for symmetry.     Pathology returned as benign unremarkable skin and breast tissue.  February 2, 2022 right diagnostic mammogram with tomosynthesis and right breast ultrasound for palpable right mass for 1 week.  Deep to the area of palpable concern there is question area of architectural distortion near a scar marker.  The mammographic appearance is more conspicuous.   Recommend anterior right breast stereotactic biopsy of the distortion.       On ultrasound, masses at 8:00 and 830 in the right breast recommend 2 site ultrasound-guided biopsy.     Masses at 9:00 in the right breast are probably benign.  If the above pathology results are benign, recommend short-term follow-up right breast ultrasound in 6 months.  BI-RADS 4     4-22- 2022 [facility]      1-  Stereo biopsy right breast architectural distortion  Stereotactic biopsy 8 gauge x6 cores, but bowtie clip was deployed.  Fibroadenomatoid hyperplasia, simple cyst, calcifications, apocrine metaplasia.  2-Ultrasound-guided biopsy 1.9 cm mass at 830, 7 cm in the nipple right breast  Ultrasound-guided biopsy right breast 830, 7 cm from the nipple 10-gauge, 6 specimens, triple twist clip was placed  Usual duct hyperplasia, apocrine metaplasia, fibroadenomatoid hyperplasia, sclerosing adenosis, concordant     3--Ultrasound-guided biopsy of a 7 mm cystic versus solid mass at 8:00, 6 cm  from the nipple right breast  Right breast ultrasound-guided biopsy 8:00, 6 cm from the nipple, 10-gauge x4 cores, wing clip was deployed.  Simple cyst, microcalcifications, focal chronic inflammation, fibroadenomatoid hyperplasia     Patient has been on Arimidex since April 2021.  With Dr. Mariano.        Right breast ultrasound August 3, 2022:  Stable appearing benign cyst in the right breast.  Postbiopsy change in the right breast.  BI-RADS 2 routine follow-up mammography      She is here to discuss RIGHT risk reducing mastectomy- the anxiety from the imaging, biopsies and cancer concern is a morbidity for her.  She was very happy with Dr Casper.    10/4/2022  9-22-21 pathology returned as RIGHT RRM-benign breast parenchyma with radial scars involved by usual duct hyperplasia, fibroadenomatoid change, apocrine cyst, scattered micro papillomas.  Negative for atypia.    Still has 2 RIGHT drains in place. Denies redness, warmth, drainage from incision.    8/2/2024 interval history  Presenting to the office today for routine follow-up.  She has no new breast complaints or concerns today.  No range of motion problems in her shoulders or neck.  No swelling is noted.    Review of Systems:  Review of Systems   Constitutional:  Negative for unexpected weight change (3 LB WT GAIN ).   Cardiovascular:  Palpitations: PALPITATIONS AT NIGHT , BUT NOT OFTEN, CLEARED BY CARDIOLOGY IN [place] AFTER ECHO AND HOLTER MONITOR.   Musculoskeletal:  Arthralgias: LEFT KNEE PAIN .   All other systems reviewed and are negative.       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 2/10/21 BENIGN.    Breast Cancer HIstory:  Patient does not have a past medical history of breast cancer.  Breast Operations, and year:  NONE   Obstetric/Gynecologic History:  Age menstrual  periods began: 12  Patient is postmenopausal due to removal of her uterus in the following year: 43   Number of pregnancies:0  Number of live births: 0  Number of abortions or miscarriages: 0  Age of delivery of first child: N/A   BREAST FEEDING: N/A   Length of time taking birth control pills: 2 YRS   Patient is presently taking the following hormone replacement:  No longer taking HRT    PATIENT HAD UTERUS AND BOTH OVARIES REMOVED.     Past Surgical History:   Procedure Laterality Date    ABDOMINAL SURGERY  7/2021 & 03/2023    Fat grafting for breast reconstruction    BILATERAL OOPHORECTOMY      AT DIFF TIMES    BREAST BIOPSY Left 01/05/2021    BREAST RECONSTRUCTION Left 03/25/2021    Procedure: LEFT PLACEMENT TISSUE EXPANDER AND ALLODERM;  Surgeon: Clifford Casper MD;  Location: [facility] OR;  Service: Plastics;  Laterality: Left;    BREAST RECONSTRUCTION Right 09/22/2022    Procedure: RIGHT PLACEMENT TISSUE EXPANDER AND ALLODERM;  Surgeon: Clifford Casper MD;  Location: [facility] OR;  Service: Plastics;  Laterality: Right;    BREAST TISSUE EXPANDER REMOVAL INSERTION OF IMPLANT Left 07/16/2021    Procedure: LEFT REMOVAL TISSUE EXPANDER AND PLACEMENT OF IMPLANT;  Surgeon: Clifford Casper MD;  Location: [facility] OR;  Service: Plastics;  Laterality: Left;    BREAST TISSUE EXPANDER REMOVAL INSERTION OF IMPLANT Right 02/09/2023    Procedure: RIGHT REMOVAL TISSUE EXPANDER AND PLACEMENT OF IMPLANT;  Surgeon: Clifford Casper MD;  Location: [facility] MAIN OR;  Service: Plastics;  Laterality: Right;    CHOLECYSTECTOMY      CYSTOSCOPY      FAT GRAFTING Left 07/16/2021    Procedure: FAT GRAFTING;  Surgeon: Clifford Casper MD;  Location: [facility] OR;  Service: Plastics;  Laterality: Left;    FAT GRAFTING Right 02/09/2023    Procedure: AND FAT GRAFTING;  Surgeon: Clifford Casper MD;  Location: [facility] MAIN OR;  Service: Plastics;  Laterality: Right;    MASTECTOMY Left      MASTECTOMY W/ SENTINEL NODE BIOPSY Left 03/25/2021    Procedure: LEFT mastectomy, LEFT axilla sentinel node biopsy,  with or without reconstruction as her surgical treatment.;  Surgeon: Zenia Washington MD;  Location: [facility] OR;  Service: General;  Laterality: Left;    MASTECTOMY WITH IMMEDIATE RECONSTRUCTION Right 09/22/2022    Procedure: RIGHT risk reducing mastectomy;  Surgeon: Zenia Washington MD;  Location: [facility] MAIN OR;  Service: General;  Laterality: Right;    MASTOPEXY Right 07/16/2021    Procedure: RIGHT MASTOPEXY FOR SYMMETRY;  Surgeon: Clifford Casper MD;  Location: [facility] OR;  Service: Plastics;  Laterality: Right;    MRI BREAST BIOPSY UNILATERAL Left 02/10/2021    SUBTOTAL HYSTERECTOMY         Past Medical History:   Diagnosis Date    Acid reflux     ON OCC    Breast cancer     DCIS LEFT BREAST.    DCIS (ductal carcinoma in situ)     LEFT BREAST    Drug therapy     ORAL MEDICIATION    Elevated cholesterol     WILL START MEDS AFTER SURGERY    Fatty liver 2022    Fibrocystic breast     STEPHANIE BREAST. BIOPSIES BENIGN IN PAST    History of hematuria     MICROSCOPIC    History of kidney stones     History of migraine     Obesity 05/2013    I have been a little overweight since 2013    PONV (postoperative nausea and vomiting)     Rosacea     Seasonal allergies        Prior Hospitalizations, other than for surgery or childbirth, and year:  NONE     Social History     Socioeconomic History    Marital status:    Tobacco Use    Smoking status: Never    Smokeless tobacco: Never   Vaping Use    Vaping status: Never Used   Substance and Sexual Activity    Alcohol use: Never    Drug use: Never    Sexual activity: Not Currently     Partners: Male     Birth control/protection: Abstinence, Post-menopausal, Bilateral salpingectomy , Hysterectomy, Surgical     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    Family History:  Family  History   Problem Relation Age of Onset    Colon polyps Mother     Heart disease Mother         Triple CABG 03/07/2017    Hypertension Mother     Hyperlipidemia Mother         Triple CABG on 03/07/2017    Hyperlipidemia Father     Hypertension Father     Malig Hyperthermia Neg Hx        Vital Signs:  LMP  (LMP Unknown)      Medications:    Current Outpatient Medications:     metronidazole powder, , Disp: , Rfl:     Semaglutide,0.25 or 0.5MG/DOS, (OZEMPIC) 2 MG/3ML solution pen-injector, Inject 0.25 mg under the skin into the appropriate area as directed 1 (One) Time Per Week. Took first injection on 10/6...  Instructed not to take anymore until after colonoscopy on 10/16, Disp: , Rfl:     VITAMIN D, CHOLECALCIFEROL, PO, Take 1,000 Units by mouth Daily. HELD FOR SURGERY, Disp: , Rfl:      Allergies:  No Known Allergies    Physical Examination:  LMP  (LMP Unknown)   General Appearance:  Patient is in no distress.  She is well kept and has an overweight build.   Psychiatric:  Patient with appropriate mood and affect. Alert and oriented to self, time, and place.    Breast, RIGHT:  Surgically absent with well healing incision. No erythema, warmth, drainage. No skin nodules or discolorations. Expander in place and not expanded.    Breast, LEFT:  Surgically absent with well-healed transverse incision and nipple areolar complex tattooed.  Implant in place.  No skin nodules or discolorations.      Lymphatic:  There is no axillary, cervical, infraclavicular, or supraclavicular adenopathy bilaterally.  Eyes:  Pupils are round and reactive to light.  Cardiovascular:  Heart rate and rhythm are regular.  Respiratory:  Lungs are clear bilaterally with no crackles or wheezes in any lung field.  Gastrointestinal:  Abdomen is soft, nondistended, and nontender.     Musculoskeletal:  Good strength in all 4 extremities.   There is good range of motion in both shoulders.    Skin:  No new skin lesions or rashes on the skin excluding the  breast (see breast exam above).    Imagin2019 BL DIAGNOSTIC MAMMO & RIGHT BREAST US [facility]      ALFREDO OSUNA  Done by [facility] in [facility].  History: Right upper-outer quadrant palpable lump.  MMG:  Heterogeneously dense. No correlate to the palpable lump.  US:  Right whole breast ultrasound, 9:30 palpable lump corresponds to a 2.4 x 2.2 cm simple cyst. There are multiple additional benign cysts. 2 of the largest are 1.8 cm at periareolar 1:00 and 1.4 cm in the retroareolar breast.  IMPRESSION:  Palpable lump is a cyst.  BI-RADS Category 2: Benign.    10/11/2019 BILATERAL SCREENING MAMMOGRAPHY         [facility]           ALFREDO OSUNA  Heterogeneously dense.  BI-RADS Category 2: Benign.    2020 BILATERAL SCREENING MAMMO WITH PAULA  [facility]        ALFREDO OSUNA  Heterogeneously dense.  BI-RADS Category 2: Benign.    12/15/2020 LEFT DIAGNOSTIC MMG WITH PAULA & LEFT BREAST US        [facility]       ALFREDO OSUNA  Single episode of spontaneous clear left nipple discharge. She states her left breast feels like it is on fire.  MMG:  Heterogeneously dense. There are multiple masses and/or ducts in the retroareolar left breast, which are located within the densest area of breast parenchyma.  US:  Left ultrasound, 6:00, 1 cm from the nipple, 0.5 x 0.4 x 0.5 cm complicated cyst versus solid mass, may be contiguous with a duct. Otherwise, numerous benign-appearing cysts and ducts are identified.  Complicated cyst versus solid mass at 6:00 in the left breast, evaluation with ultrasound-guided core needle biopsy is recommended.  BI-RADS Category 4: Suspicious.    2021 BILATERAL BREAST MRI         [facility]           ALFREDO OSUNA  Subareolar right breast, 2.1 cm cyst, which corresponds to the palpable lump.  LEFT BREAST:   6:00, 3.6 cm posterior to the nipple, susceptibility from a biopsy clip, which marks the site of biopsy-proven atypia. No suspicious mass enhancement at this  location.   9:00, 3.9 cm posterior to the nipple, there is a 1.2 cm focal area of clumped nonmass enhancement.  IMPRESSION AND RECOMMENDATION:  9:00, left breast. Further evaluation with MRI guided core needle biopsy is recommended.  BI-RADS Category 4: Suspicious.    01/25/2021 RIGHT DIAGNOSTIC MMG WITH PAULA & RIGHT BREAST US    TOYA OSUNA  CLINICAL INDICATION: Right breast pain and hardening around the right nipple.  MMG:  Heterogeneously dense. There are no suspicious masses, calcifications, or areas of architectural distortion.  US:  Retroareolar right breast, area of concern, 6:00 there is a palpable 2.0 benign-appearing complicated cyst.  IMPRESSION:  BI-RADS Category 2: Benign.     On ultrasound, masses at 8:00 and 830 in the right breast recommend 2 site ultrasound-guided biopsy.     Masses at 9:00 in the right breast are probably benign.  If the above pathology results are benign, recommend short-term follow-up right breast ultrasound in 6 months.  BI-RADS 4     Right breast ultrasound August 3, 2022:  Stable appearing benign cyst in the right breast.  Postbiopsy change in the right breast.  BI-RADS 2 routine follow-up mammography.    Pathology:  01/05/2021 LEFT DIAGNOSTIC MAMMO & US GUIDED BREAST BIOPSY      [facility]      ALFREDO OSUNA  6:00, 1 cm from the nipple in the left breast, 11 g needle. 11 core specimens were obtained. A bowtie clip was then deployed. Bowtie clip at the expected location in the lower central anterior left breast.  Pathology demonstrates severe atypical ductal hyperplasia bordering on low-grade DCIS, which is concordant.  1. 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  Sections demonstrate and area of possible cyst wall/dilated duct with associated apocrine cysts and proliferative breast changes. Multiple ducts demonstrate areas of micropapillary atypia, which by  IHC qualify as at least ADH. Definitive diagnosis is best deferred to the excision.    02/10/2021 MRI BREAST BIOPSY           [facility]   ALFREDO OSUNA  An 8 gauge Mammotome. 14 core samples were obtained. A U-shaped clip.   Post-procedural mammographic views of the left breast demonstrate the U-shaped clip at the expected location in the inner central middle to anterior left breast, approximately 3.1 cm medial to the bowtie-shaped biopsy clip at the site of biopsy-proven atypia bordering on DCIS.  Pathology is high risk and concordant with the imaging assessment.  PATHOLOGY:  1. Left Breast, 9:00 o’clock, MRI-Guided Biopsies:  A. Proliferative breast parenchyma with sclerotic intraductal papillomas with florid duct hyperplasia, columnar cell hyperplasia and apocrine cysts.  B. Focal microcalcifications associated with columnar cell hyperplasia.  C. No atypical hyperplasia, in-situ nor invasive carcinoma identified.      Pathology from 3-25-21 LEFT total mastectomy and SLNB with expander reconstruction Dr Casper returned as :  Multiple intraductal papillomas, sclerosing adenosis, usual hyperplasia, columnar cell change, apocrine cyst, fibroadenomatoid change, negative for malignancy.  0 of 1 sentinel node.  Pathologic stage Tis N0 stage 0.     Dr Fernando called me to comment on the numerous papillomas in the breast.            Final Diagnosis    1. Breast, Left, Mastectomy (912.6 grams):               A. Multiple intraductal papillomas, sclerosing adenosis, florid ductal hyperplasia of the usual type, columnar cell         change, clustered apocrine cysts, fibroadenomatoid change and scattered benign microcalcifications.  B. Negative for atypia, in situ and infiltrating carcinoma.     2. Lymph Node [place] #1, Excision:               A. Reactive lymph node (0/1).     3. Skin, Left Breast, Excision:               A. Benign skin and subcutaneous tissue.      [facility]     Electronically signed by Magdalena Fernando MD on  3/26/2021 at 1541    Synoptic Checklist    DCIS OF THE BREAST: Resection  8th Edition - Protocol posted: 2/26/2020  DCIS OF THE BREAST: COMPLETE EXCISION - All Specimens       SPECIMEN   Procedure   Total mastectomy    Specimen Laterality   Left    TUMOR   Tumor Site   Clock position        6 o'clock    Histologic Type   Ductal carcinoma in situ    Size (Extent) of DCIS   Estimated size (extent) of DCIS is at least (Millimeters): 3 (on core biopsy [id number]) mm   Architectural Patterns   Micropapillary    Nuclear Grade   Grade I (low)    Necrosis   Not identified    Microcalcifications   Present in nonneoplastic tissue    MARGINS   Margins   Uninvolved by DCIS    Distance from Closest Margin (Millimeters)   15 (measured from bowtie clip) mm   Closest Margin(s)   Anterior    LYMPH NODES   Regional Lymph Nodes   Uninvolved by tumor cells    Total Number of Lymph Nodes Examined   1    Number of [place] Nodes Examined   1    PATHOLOGIC STAGE CLASSIFICATION (pTNM, AJCC 8th Edition)       Primary Tumor (pT)   pTis (DCIS)    Regional Lymph Nodes Modifier   (sn): [place] node(s) evaluated    Regional Lymph Nodes (pN)   pN0    SPECIAL STUDIES   Breast Biomarker Testing Performed on Previous Biopsy       Estrogen Receptor (ER) Status   Positive    Percentage of Cells with Nuclear Positivity   %    Breast Biomarker Testing Performed on Previous Biopsy       Progesterone Receptor (PgR) Status   Positive    Percentage of Cells with Nuclear Positivity   51-60%    Testing Performed on Case Number   [id number]    .      Comment      The patient's concurrent left breast core biopsy material is on file with our department ([id number]); slides are pulled and reviewed for comparison.  No discordance is noted.          Selected slides from this case are shared in internal consultation with Mu Dominguez and Donnell who concur.     Given that there is no definitive residual in situ carcinoma present in mastectomy specimen. The specimen is  staged from the previous core where the DCIS measured 3 mm maximally. This case is discussed with Dr. Washington on 3/26/21.     [facility]         Pathology returned as benign unremarkable skin and breast tissue.  February 2, 2022 right diagnostic mammogram with tomosynthesis and right breast ultrasound for palpable right mass for 1 week.  Deep to the area of palpable concern there is question area of architectural distortion near a scar marker.  The mammographic appearance is more conspicuous.   Recommend anterior right breast stereotactic biopsy of the distortion.        4-22- 2022 [facility]      1-  Stereo biopsy right breast architectural distortion  Stereotactic biopsy 8 gauge x6 cores, but bowtie clip was deployed.  Fibroadenomatoid hyperplasia, simple cyst, calcifications, apocrine metaplasia.  2-Ultrasound-guided biopsy 1.9 cm mass at 830, 7 cm in the nipple right breast  Ultrasound-guided biopsy right breast 830, 7 cm from the nipple 10-gauge, 6 specimens, triple twist clip was placed  Usual duct hyperplasia, apocrine metaplasia, fibroadenomatoid hyperplasia, sclerosing adenosis, concordant     3--Ultrasound-guided biopsy of a 7 mm cystic versus solid mass at 8:00, 6 cm from the nipple right breast  Right breast ultrasound-guided biopsy 8:00, 6 cm from the nipple, 10-gauge x4 cores, wing clip was deployed.  Simple cyst, microcalcifications, focal chronic inflammation, fibroadenomatoid hyperplasia    9-22-21 pathology returned as RIGHT RRM-benign breast parenchyma with radial scars involved by usual duct hyperplasia, fibroadenomatoid change, apocrine cyst, scattered micro papillomas.  Negative for atypia.  We will let her know.  Final Diagnosis   1. Right Breast, Simple Skin-Sparing Mastectomy (1,040 grams):               A. Benign breast parenchyma with radial scars involved by usual ductal hyperplasia, fibroadenomatoid                   change, cluster of apocrine cysts, and scattered  micropapillomas.               B. Three clips and biopsy site changes present.               C. Unremarkable skin and nipple.               D. Negative for atypical hyperplasia and malignancy.    Electronically signed by Kathrine Dominguez MD on 9/23/2022 at 1112             Multi cancer panel dated February 24, 2021 for [facility] returned as no known mutations.  A variant of uncertain significance was identified inRECQL c.1031 G>A.          Her outside pathology review by Dr Choi returned as :  Low-grade duct carcinoma in situ, micropapillary type, atypical lobular hyperplasia, sclerosing adenosis, intraductal papilloma.     July 16, 2021 Dr. Casper left tissue expander exchange for implant and fat grafting with my right mastopexy for symmetry.     Patient has been on Arimidex since April 2021.  With Dr. Mariano.      Procedures:  Ultrasound-guided cyst aspiration 2-12-21  Indication:  symptomatic cyst  Location: RIGHT Breast 6:00 areolar margin  Consent:  The risks, benefits, and alternatives to the procedure were discussed with the patient, who understood and wished to proceed.  The risks described included, but were not limited to, bleeding, infection, pneumothorax, and cyst recurrence requiring percutaneous excisional biopsy.  Description of Procedure:   After the patient was positioned supine on the procedure table, I located the cyst using ultrasound.  I prepped and draped the breast skin in sterile fashion.  I anesthetized the breast skin with 1% lidocaine with epinephrine.  I then anesthetized the underlying subcutaneous tissue and breast parenchyma surrounding the lesion with 1% lidocaine with epinephrine under ultrasound visualization and guidance. I then inserted a 21 G needle (attached to a syringe) into the cyst under ultrasound guidance and aspirated the cyst fluid until the cyst completely disappeared. The fluid aspirated was typical cyst fluid, nonbloody. 1cc.  The fluid was discarded.  Manual  compression was held for 5 minutes and a bandaid applied.  Marker placed: none  Tolerance: The patient tolerated the procedure well.  Disposition: as below    Assessment:  No diagnosis found.   1-2  9-22-21 pathology returned as RIGHT RRM-benign breast parenchyma with radial scars involved by usual duct hyperplasia, fibroadenomatoid change, apocrine cyst, scattered micro papillomas.  Negative for atypia.      3-  Left breast 6:00, 1 cm from the nipple, central, 5 mm mass on ultrasound, severe atypical duct hyperplasia bordering on low-grade duct carcinoma in situ, multiple ducts with micropapillary atypical duct hyperplasia at least, recommend excision.  Bowtie marker.  Note that the bowtie marker and the you marker at the site of papilloma are 3.1 cm apart.    TisN0- stage 0    2-20-21-  Her outside pathology review by Dr Choi returned as :  Low-grade duct carcinoma in situ, micropapillary type, atypical lobular hyperplasia, sclerosing adenosis, intraductal papilloma.    Pathology from 3-25-21 LEFT total mastectomy and SLNB with prepectoral expander reconstruction Dr Casper returned as :  Multiple intraductal papillomas, sclerosing adenosis, usual hyperplasia, columnar cell change, apocrine cyst, fibroadenomatoid change, negative for malignancy.  0 of 1 sentinel node.  Pathologic stage Tis N0 stage 0.     Dr Fernando called me to comment on the numerous papillomas in the breast.  Dr. Casper took her for an expander exchange for implant, fat grafting and right mastopexy July 2021.  Dr. Mariano started Arimidex in April 2021.  Tolerating this well    Multi cancer panel dated February 24, 2021 for [facility] returned as no known mutations.  A variant of uncertain significance was identified in RECQL c.1031 G>A.        4  RIGHT 6:00 areolar margin- 2 cm symptomatic cyst- target for aspiration at initial visit  February 22, 2022 [facility]:  1.  Stereotactic biopsy right breast bowtie clip:  Fibroadenomatoid hyperplasia, calcifications, apocrine metaplasia.  2.  Ultrasound-guided biopsy right breast 830, 7 cm the nipple: Usual duct hyperplasia, apocrine metaplasia, fibroadenomatoid hyperplasia, sclerosing adenosis.  Triple twist clip.  3.  Ultrasound-guided biopsy 8:00, 6 cm in the nipple right breast  Calcifications, chronic inflammation, fibroadenomatoid hyperplasia.     5-  LEFT 9:00,  4CFN- 1.2 cm enhancement on MRI- U marker- papilloma, concordant      6-  Does not know family history- family is international  Multi cancer panel dated February 24, 2021 for [facility] returned as no known mutations.  A variant of uncertain significance was identified in RECQL c.1031 G>A.     7-  On HRT at presentation, stopped after consultation    Plan:  Exam in 1 year  Note that the left mastectomy was for DCIS in the right mastectomy was risk reducing.  She did have some high risk lesions on the right and we discussed this today.  I will arrange for her to come back in 6 months with no imaging and see our nurse practitioner.  Thereafter she will see the nurse practitioner for a total of 5 years.  She is not followed by medical oncology or radiation oncology.  We discussed her surveillance, namely that is no further mammography.  Ultrasound for any focal examination findings, MRI if the plastic surgeon Dr. Casper has concerns about her implants.  As to continue her self breast exam and to call us in the interim with concerns otherwise we will see her back in 6 months with no imaging with our nurse practitioner.          ELISEO Posey    Next Appointment:  No follow-ups on file.        EMR Dragon/transcription disclaimer:    Much of this encounter note is an electronic transcription/translocation of spoken language to printed text.  The electronic translation of spoken language may permit erroneous, or at times, nonsensical words or phrases to be inadvertently transcribed.  Although I have reviewed the note  from such areas, some may still exist.                Answers for HPI/ROS submitted by the patient on 10/2/2022  Please describe your symptoms.: Post-Op  Have you had these symptoms before?: No  How long have you been having these symptoms?: 1-2 weeks  What is the primary reason for your visit?: Other

## 2024-08-02 ENCOUNTER — OFFICE VISIT (OUTPATIENT)
Dept: SURGERY | Facility: CLINIC | Age: 49
End: 2024-08-02
Payer: COMMERCIAL

## 2024-08-02 VITALS
BODY MASS INDEX: 32.06 KG/M2 | SYSTOLIC BLOOD PRESSURE: 128 MMHG | HEART RATE: 87 BPM | WEIGHT: 192.4 LBS | OXYGEN SATURATION: 97 % | HEIGHT: 65 IN | DIASTOLIC BLOOD PRESSURE: 78 MMHG

## 2024-08-02 DIAGNOSIS — D05.12 BREAST NEOPLASM, TIS (DCIS), LEFT: Primary | ICD-10-CM

## 2024-08-02 PROCEDURE — 99213 OFFICE O/P EST LOW 20 MIN: CPT | Performed by: NURSE PRACTITIONER

## 2024-08-02 RX ORDER — PILOCARPINE HYDROCHLORIDE 12.5 MG/ML
SOLUTION/ DROPS OPHTHALMIC
COMMUNITY
Start: 2024-05-14

## 2024-08-19 ENCOUNTER — TELEPHONE (OUTPATIENT)
Dept: GASTROENTEROLOGY | Facility: CLINIC | Age: 49
End: 2024-08-19
Payer: COMMERCIAL

## 2024-08-19 NOTE — TELEPHONE ENCOUNTER
Caller: Ernestina Urrutia    Relationship to patient: Self    Best call back number:  [phone]    Chief complaint: CANCEL PROCEDURE     Type of visit: COLONOSCOPY      Requested date: WILL ALL BACK    If rescheduling, when is the original appointment: 09/12/2024    Additional notes:PT WILL CALL BACK AT ANOTHER TIME TO RESCHEDULE PROCEDURE.

## 2024-11-13 ENCOUNTER — HOSPITAL ENCOUNTER (OUTPATIENT)
Dept: GENERAL RADIOLOGY | Facility: HOSPITAL | Age: 49
Discharge: HOME | End: 2024-11-13
Admitting: FAMILY MEDICINE
Payer: COMMERCIAL

## 2024-11-13 ENCOUNTER — OFFICE VISIT (OUTPATIENT)
Dept: FAMILY MEDICINE CLINIC | Facility: CLINIC | Age: 49
End: 2024-11-13
Payer: COMMERCIAL

## 2024-11-13 VITALS
BODY MASS INDEX: 32.46 KG/M2 | WEIGHT: 194.8 LBS | HEIGHT: 65 IN | SYSTOLIC BLOOD PRESSURE: 110 MMHG | OXYGEN SATURATION: 97 % | TEMPERATURE: 97.9 F | HEART RATE: 90 BPM | DIASTOLIC BLOOD PRESSURE: 84 MMHG

## 2024-11-13 DIAGNOSIS — R05.1 ACUTE COUGH: ICD-10-CM

## 2024-11-13 DIAGNOSIS — J18.9 COMMUNITY ACQUIRED PNEUMONIA OF LEFT LOWER LOBE OF LUNG: ICD-10-CM

## 2024-11-13 DIAGNOSIS — R05.9 COUGH, UNSPECIFIED TYPE: Primary | ICD-10-CM

## 2024-11-13 LAB
EXPIRATION DATE: NORMAL
FLUAV AG UPPER RESP QL IA.RAPID: NOT DETECTED
FLUBV AG UPPER RESP QL IA.RAPID: NOT DETECTED
INTERNAL CONTROL: NORMAL
Lab: NORMAL
SARS-COV-2 AG UPPER RESP QL IA.RAPID: NOT DETECTED

## 2024-11-13 PROCEDURE — 71046 X-RAY EXAM CHEST 2 VIEWS: CPT

## 2024-11-13 PROCEDURE — 87428 SARSCOV & INF VIR A&B AG IA: CPT | Performed by: FAMILY MEDICINE

## 2024-11-13 PROCEDURE — 99214 OFFICE O/P EST MOD 30 MIN: CPT | Performed by: FAMILY MEDICINE

## 2024-11-13 RX ORDER — AMOXICILLIN 500 MG/1
1000 CAPSULE ORAL 3 TIMES DAILY
Qty: 60 CAPSULE | Refills: 0 | Status: SHIPPED | OUTPATIENT
Start: 2024-11-13

## 2024-11-13 RX ORDER — AZITHROMYCIN 250 MG/1
TABLET, FILM COATED ORAL
Qty: 6 TABLET | Refills: 0 | Status: SHIPPED | OUTPATIENT
Start: 2024-11-13

## 2024-11-13 NOTE — PROGRESS NOTES
Subjective   Ernestina Urrutia is a 49 y.o. female.     Chief Complaint   Patient presents with    Shortness of Breath    Cough     Prod. Cough - green mucous - upper back pain with deep breath    Nasal Congestion       Having cough and congestion for one week. Fevers. Good po. Good uop. Home covid test was neg.         The following portions of the patient's history were reviewed and updated as appropriate: allergies, current medications, past family history, past medical history, past social history, past surgical history and problem list.    Past Medical History:   Diagnosis Date    Acid reflux     ON OCC    Breast cancer     DCIS LEFT BREAST.    DCIS (ductal carcinoma in situ)     LEFT BREAST    Drug therapy     ORAL MEDICIATION    Elevated cholesterol     WILL START MEDS AFTER SURGERY    Fatty liver 2022    Fibrocystic breast     STEPHANIE BREAST. BIOPSIES BENIGN IN PAST    History of hematuria     MICROSCOPIC    History of kidney stones     History of migraine     Obesity 05/2013    I have been a little overweight since 2013    PONV (postoperative nausea and vomiting)     Rosacea     Seasonal allergies        Past Surgical History:   Procedure Laterality Date    ABDOMINAL SURGERY  7/2021 & 03/2023    Fat grafting for breast reconstruction    BILATERAL OOPHORECTOMY      AT DIFF TIMES    BREAST BIOPSY Left 01/05/2021    BREAST RECONSTRUCTION Left 03/25/2021    Procedure: LEFT PLACEMENT TISSUE EXPANDER AND ALLODERM;  Surgeon: Clifford Casper MD;  Location: [facility] OR;  Service: Plastics;  Laterality: Left;    BREAST RECONSTRUCTION Right 09/22/2022    Procedure: RIGHT PLACEMENT TISSUE EXPANDER AND ALLODERM;  Surgeon: Clifford Casper MD;  Location: [facility] OR;  Service: Plastics;  Laterality: Right;    BREAST TISSUE EXPANDER REMOVAL INSERTION OF IMPLANT Left 07/16/2021    Procedure: LEFT REMOVAL TISSUE EXPANDER AND PLACEMENT OF IMPLANT;  Surgeon: Clifford Casper MD;  Location: [facility]  OR;  Service: Plastics;  Laterality: Left;    BREAST TISSUE EXPANDER REMOVAL INSERTION OF IMPLANT Right 02/09/2023    Procedure: RIGHT REMOVAL TISSUE EXPANDER AND PLACEMENT OF IMPLANT;  Surgeon: Clifford Casper MD;  [facility];  Service: Plastics;  Laterality: Right;    CHOLECYSTECTOMY      CYSTOSCOPY      FAT GRAFTING Left 07/16/2021    Procedure: FAT GRAFTING;  Surgeon: Clifford Casper MD;  [facility];  Service: Plastics;  Laterality: Left;    FAT GRAFTING Right 02/09/2023    Procedure: AND FAT GRAFTING;  Surgeon: Clifford Casper MD;  [facility];  Service: Plastics;  Laterality: Right;    MASTECTOMY Left     MASTECTOMY W/ SENTINEL NODE BIOPSY Left 03/25/2021    Procedure: LEFT mastectomy, LEFT axilla sentinel node biopsy,  with or without reconstruction as her surgical treatment.;  Surgeon: Zenia Washington MD;  Location: [facility] MAIN OR;  Service: General;  Laterality: Left;    MASTECTOMY WITH IMMEDIATE RECONSTRUCTION Right 09/22/2022    Procedure: RIGHT risk reducing mastectomy;  Surgeon: Zenia Washington MD;  Location: [facility] MAIN OR;  Service: General;  Laterality: Right;    MASTOPEXY Right 07/16/2021    Procedure: RIGHT MASTOPEXY FOR SYMMETRY;  Surgeon: Clifford Casper MD;  Location: [facility] OR;  Service: Plastics;  Laterality: Right;    MRI BREAST BIOPSY UNILATERAL Left 02/10/2021    SUBTOTAL HYSTERECTOMY         Family History   Problem Relation Age of Onset    Colon polyps Mother     Heart disease Mother         Triple CABG 03/07/2017    Hypertension Mother     Hyperlipidemia Mother         Triple CABG on 03/07/2017    Hyperlipidemia Father     Hypertension Father     Malig Hyperthermia Neg Hx        Social History     Socioeconomic History    Marital status:    Tobacco Use    Smoking status: Never    Smokeless tobacco: Never   Vaping Use    Vaping status: Never Used   Substance and Sexual Activity    Alcohol use: Never  "   Drug use: Never    Sexual activity: Not Currently     Partners: Male     Birth control/protection: Abstinence, Post-menopausal, Bilateral salpingectomy , Hysterectomy, Surgical       Review of Systems   Respiratory:  Negative for shortness of breath.        Objective   Visit Vitals  /84 (BP Location: Right arm, Patient Position: Sitting, Cuff Size: Large Adult)   Pulse 90   Temp 97.9 °F (36.6 °C) (Temporal)   Ht 165.1 cm (65\")   Wt 88.4 kg (194 lb 12.8 oz)   LMP  (LMP Unknown)   SpO2 97%   BMI 32.42 kg/m²     Body mass index is 32.42 kg/m².  Physical Exam  Constitutional:       General: She is not in acute distress.     Appearance: Normal appearance. She is well-developed.   Cardiovascular:      Rate and Rhythm: Normal rate and regular rhythm.      Heart sounds: Normal heart sounds.   Pulmonary:      Effort: Pulmonary effort is normal.      Breath sounds: Rhonchi present.      Comments: Left lower lobe rhonchi  Musculoskeletal:         General: No swelling. Normal range of motion.   Skin:     General: Skin is warm and dry.      Findings: No rash.   Neurological:      General: No focal deficit present.      Mental Status: She is alert and oriented to person, place, and time.   Psychiatric:         Mood and Affect: Mood normal.         Behavior: Behavior normal.           Assessment & Plan   Diagnoses and all orders for this visit:    1. Cough, unspecified type (Primary)  -     POCT SARS-CoV-2 Antigen YESSI + Flu    2. Acute cough  -     XR Chest 2 View    3. Community acquired pneumonia of left lower lobe of lung  -     amoxicillin (AMOXIL) 500 MG capsule; Take 2 capsules by mouth 3 (Three) Times a Day.  Dispense: 60 capsule; Refill: 0  -     azithromycin (Zithromax Z-Mundo) 250 MG tablet; Take 2 tablets by mouth on day 1, then 1 tablet daily on days 2-5  Dispense: 6 tablet; Refill: 0                   Will await cxr read. Sent to hospital. Rest, fluids and follow up if worse or no better.     "

## 2024-11-14 ENCOUNTER — TELEPHONE (OUTPATIENT)
Dept: FAMILY MEDICINE CLINIC | Facility: CLINIC | Age: 49
End: 2024-11-14

## 2024-11-14 NOTE — TELEPHONE ENCOUNTER
Caller: Ernestina Urrutia    Relationship: Self    Best call back number: [phone]    What test was performed: CHEST X RAY     When was the test performed: 11/13    Where was the test performed: [facility]     Additional notes: PLEASE ADVISE PATIENT ON RESULTS ASAP. PATIENT HAS NOT RECEIVED THE RESULTS ON PORTAL.

## 2024-11-14 NOTE — TELEPHONE ENCOUNTER
Informed patient results are not ready yet and that as soon as provider receives results and reviews them she will be notified with results.

## 2025-04-28 ENCOUNTER — OFFICE VISIT (OUTPATIENT)
Dept: FAMILY MEDICINE CLINIC | Facility: CLINIC | Age: 50
End: 2025-04-28
Payer: COMMERCIAL

## 2025-04-28 VITALS
HEART RATE: 78 BPM | TEMPERATURE: 97.9 F | DIASTOLIC BLOOD PRESSURE: 80 MMHG | HEIGHT: 65 IN | BODY MASS INDEX: 32.92 KG/M2 | OXYGEN SATURATION: 98 % | RESPIRATION RATE: 16 BRPM | SYSTOLIC BLOOD PRESSURE: 112 MMHG | WEIGHT: 197.6 LBS

## 2025-04-28 DIAGNOSIS — R93.1 ABNORMAL SCREENING CARDIAC CT: ICD-10-CM

## 2025-04-28 DIAGNOSIS — R05.8 POST-VIRAL COUGH SYNDROME: Primary | ICD-10-CM

## 2025-04-28 PROCEDURE — 99214 OFFICE O/P EST MOD 30 MIN: CPT | Performed by: FAMILY MEDICINE

## 2025-04-28 RX ORDER — LANOLIN ALCOHOL/MO/W.PET/CERES
1000 CREAM (GRAM) TOPICAL DAILY
COMMUNITY

## 2025-04-28 RX ORDER — MONTELUKAST SODIUM 10 MG/1
10 TABLET ORAL NIGHTLY
Qty: 30 TABLET | Refills: 3 | Status: SHIPPED | OUTPATIENT
Start: 2025-04-28

## 2025-04-28 NOTE — PROGRESS NOTES
"Chief Complaint  Cough (Productive cough ) and Anal Itching    Subjective        Ernestina Urrutia presents to [facility]  Cough      Pt presents for cough for over a year and she had pneumonia in November and was treated.  She had Covid about a year ago and has been having intermittent issues with this since then.  She has had tessalon perles and didn't really help.      Anal itching for the past several months.  She did go to [facility].    No hx of hemorrhoids.  No bleeding or mucus.  She has slight   Objective   Vital Signs:  /80 (BP Location: Right arm, Patient Position: Sitting)   Pulse 78   Temp 97.9 °F (36.6 °C)   Resp 16   Ht 165.1 cm (65\")   Wt 89.6 kg (197 lb 9.6 oz)   SpO2 98%   BMI 32.88 kg/m²   Estimated body mass index is 32.88 kg/m² as calculated from the following:    Height as of this encounter: 165.1 cm (65\").    Weight as of this encounter: 89.6 kg (197 lb 9.6 oz).            Physical Exam  Vitals and nursing note reviewed.   Constitutional:       Appearance: Normal appearance. She is well-developed.   Cardiovascular:      Rate and Rhythm: Normal rate and regular rhythm.      Heart sounds: Normal heart sounds. No murmur heard.  Pulmonary:      Effort: Pulmonary effort is normal. No respiratory distress.      Breath sounds: Normal breath sounds. No stridor. No wheezing or rhonchi.   Neurological:      General: No focal deficit present.      Mental Status: She is alert and oriented to person, place, and time. She is not disoriented.   Psychiatric:         Mood and Affect: Mood normal.         Behavior: Behavior normal.        Result Review :                Assessment and Plan   Diagnoses and all orders for this visit:    1. Post-viral cough syndrome (Primary)  -     montelukast (Singulair) 10 MG tablet; Take 1 tablet by mouth Every Night.  Dispense: 30 tablet; Refill: 3    2. Abnormal screening cardiac CT  -     CT Cardiac Calcium Score Without Dye; " Future    Other orders  -     mebendazole (VERMOX) 100 MG chewable tablet; Take one pill and then repeat in 14 days  Dispense: 2 tablet; Refill: 0             Follow Up   No follow-ups on file.  Patient was given instructions and counseling regarding her condition or for health maintenance advice. Please see specific information pulled into the AVS if appropriate.         Start singulair for the chronic cough.  If no better, will get pulm referral.    Start vermox

## 2025-05-05 RX ORDER — AZITHROMYCIN 250 MG/1
TABLET, FILM COATED ORAL
Qty: 6 TABLET | Refills: 0 | Status: SHIPPED | OUTPATIENT
Start: 2025-05-05

## 2025-06-14 ENCOUNTER — HOSPITAL ENCOUNTER (OUTPATIENT)
Dept: CT IMAGING | Facility: HOSPITAL | Age: 50
Discharge: HOME | End: 2025-06-14

## 2025-06-14 DIAGNOSIS — R93.1 ABNORMAL SCREENING CARDIAC CT: ICD-10-CM

## 2025-06-14 PROCEDURE — 75571 CT HRT W/O DYE W/CA TEST: CPT

## 2025-06-25 DIAGNOSIS — K44.9 HIATAL HERNIA: Primary | ICD-10-CM

## 2025-06-25 RX ORDER — OMEPRAZOLE 20 MG/1
20 CAPSULE, DELAYED RELEASE ORAL DAILY
Qty: 30 CAPSULE | Refills: 3 | Status: SHIPPED | OUTPATIENT
Start: 2025-06-25

## 2025-07-24 DIAGNOSIS — R05.8 POST-VIRAL COUGH SYNDROME: Primary | ICD-10-CM

## 2025-07-24 DIAGNOSIS — R12 HEARTBURN: ICD-10-CM

## 2025-07-24 DIAGNOSIS — R10.9 ABDOMINAL DISCOMFORT: ICD-10-CM

## 2025-07-24 RX ORDER — AZITHROMYCIN 250 MG/1
TABLET, FILM COATED ORAL
Qty: 6 TABLET | Refills: 0 | Status: SHIPPED | OUTPATIENT
Start: 2025-07-24

## 2025-08-21 ENCOUNTER — OFFICE VISIT (OUTPATIENT)
Dept: FAMILY MEDICINE CLINIC | Facility: CLINIC | Age: 50
End: 2025-08-21
Payer: COMMERCIAL

## 2025-08-21 VITALS
TEMPERATURE: 98 F | WEIGHT: 205 LBS | HEIGHT: 65 IN | RESPIRATION RATE: 16 BRPM | HEART RATE: 77 BPM | SYSTOLIC BLOOD PRESSURE: 100 MMHG | DIASTOLIC BLOOD PRESSURE: 62 MMHG | OXYGEN SATURATION: 96 % | BODY MASS INDEX: 34.16 KG/M2

## 2025-08-21 DIAGNOSIS — E55.9 VITAMIN D DEFICIENCY: ICD-10-CM

## 2025-08-21 DIAGNOSIS — R06.2 WHEEZING: ICD-10-CM

## 2025-08-21 DIAGNOSIS — R73.03 PREDIABETES: ICD-10-CM

## 2025-08-21 DIAGNOSIS — Z00.00 HEALTH CARE MAINTENANCE: Primary | ICD-10-CM

## 2025-08-21 DIAGNOSIS — Z13.6 SCREENING FOR CARDIOVASCULAR CONDITION: ICD-10-CM

## 2025-08-21 DIAGNOSIS — R05.8 POST-VIRAL COUGH SYNDROME: ICD-10-CM

## 2025-08-21 PROCEDURE — 99396 PREV VISIT EST AGE 40-64: CPT | Performed by: FAMILY MEDICINE

## 2025-08-21 RX ORDER — PREDNISONE 5 MG/1
TABLET ORAL
Qty: 21 TABLET | Refills: 0 | Status: SHIPPED | OUTPATIENT
Start: 2025-08-21

## 2025-08-21 RX ORDER — CETIRIZINE HYDROCHLORIDE 10 MG/1
10 TABLET ORAL DAILY
COMMUNITY

## 2025-08-22 ENCOUNTER — RESULTS FOLLOW-UP (OUTPATIENT)
Dept: FAMILY MEDICINE CLINIC | Facility: CLINIC | Age: 50
End: 2025-08-22
Payer: COMMERCIAL

## 2025-08-22 DIAGNOSIS — E78.2 MIXED HYPERLIPIDEMIA: Primary | ICD-10-CM

## 2025-08-22 LAB
25(OH)D3+25(OH)D2 SERPL-MCNC: 31.4 NG/ML (ref 30–100)
ALBUMIN SERPL-MCNC: 4.4 G/DL (ref 3.5–5.2)
ALBUMIN/GLOB SERPL: 1.6 G/DL
ALP SERPL-CCNC: 113 U/L (ref 39–117)
ALT SERPL-CCNC: 39 U/L (ref 1–33)
AST SERPL-CCNC: 23 U/L (ref 1–32)
BASOPHILS # BLD AUTO: 0.05 10*3/MM3 (ref 0–0.2)
BASOPHILS NFR BLD AUTO: 0.7 % (ref 0–1.5)
BILIRUB SERPL-MCNC: 1 MG/DL (ref 0–1.2)
BUN SERPL-MCNC: 10 MG/DL (ref 6–20)
BUN/CREAT SERPL: 13.3 (ref 7–25)
CALCIUM SERPL-MCNC: 9.8 MG/DL (ref 8.6–10.5)
CHLORIDE SERPL-SCNC: 107 MMOL/L (ref 98–107)
CHOLEST SERPL-MCNC: 244 MG/DL (ref 0–200)
CO2 SERPL-SCNC: 21.4 MMOL/L (ref 22–29)
CREAT SERPL-MCNC: 0.75 MG/DL (ref 0.57–1)
EGFRCR SERPLBLD CKD-EPI 2021: 97.1 ML/MIN/1.73
EOSINOPHIL # BLD AUTO: 0.48 10*3/MM3 (ref 0–0.4)
EOSINOPHIL NFR BLD AUTO: 6.8 % (ref 0.3–6.2)
ERYTHROCYTE [DISTWIDTH] IN BLOOD BY AUTOMATED COUNT: 12.7 % (ref 12.3–15.4)
GLOBULIN SER CALC-MCNC: 2.8 GM/DL
GLUCOSE SERPL-MCNC: 100 MG/DL (ref 65–99)
HBA1C MFR BLD: 6 % (ref 4.8–5.6)
HCT VFR BLD AUTO: 44.1 % (ref 34–46.6)
HDLC SERPL-MCNC: 52 MG/DL (ref 40–60)
HGB BLD-MCNC: 14.6 G/DL (ref 12–15.9)
IMM GRANULOCYTES # BLD AUTO: 0.02 10*3/MM3 (ref 0–0.05)
IMM GRANULOCYTES NFR BLD AUTO: 0.3 % (ref 0–0.5)
LDLC SERPL CALC-MCNC: 161 MG/DL (ref 0–100)
LYMPHOCYTES # BLD AUTO: 2.25 10*3/MM3 (ref 0.7–3.1)
LYMPHOCYTES NFR BLD AUTO: 31.8 % (ref 19.6–45.3)
MCH RBC QN AUTO: 29.9 PG (ref 26.6–33)
MCHC RBC AUTO-ENTMCNC: 33.1 G/DL (ref 31.5–35.7)
MCV RBC AUTO: 90.2 FL (ref 79–97)
MONOCYTES # BLD AUTO: 0.44 10*3/MM3 (ref 0.1–0.9)
MONOCYTES NFR BLD AUTO: 6.2 % (ref 5–12)
NEUTROPHILS # BLD AUTO: 3.83 10*3/MM3 (ref 1.7–7)
NEUTROPHILS NFR BLD AUTO: 54.2 % (ref 42.7–76)
NRBC BLD AUTO-RTO: 0 /100 WBC (ref 0–0.2)
PLATELET # BLD AUTO: 369 10*3/MM3 (ref 140–450)
POTASSIUM SERPL-SCNC: 4 MMOL/L (ref 3.5–5.2)
PROT SERPL-MCNC: 7.2 G/DL (ref 6–8.5)
RBC # BLD AUTO: 4.89 10*6/MM3 (ref 3.77–5.28)
SODIUM SERPL-SCNC: 141 MMOL/L (ref 136–145)
TRIGL SERPL-MCNC: 172 MG/DL (ref 0–150)
TSH SERPL DL<=0.005 MIU/L-ACNC: 1.52 UIU/ML (ref 0.27–4.2)
VIT B12 SERPL-MCNC: 1713 PG/ML (ref 211–946)
VLDLC SERPL CALC-MCNC: 31 MG/DL (ref 5–40)
WBC # BLD AUTO: 7.07 10*3/MM3 (ref 3.4–10.8)

## 2025-08-22 RX ORDER — ATORVASTATIN CALCIUM 20 MG/1
20 TABLET, FILM COATED ORAL NIGHTLY
Qty: 30 TABLET | Refills: 3 | Status: SHIPPED | OUTPATIENT
Start: 2025-08-22

## (undated) DEVICE — COVER,MAYO STAND,STERILE: Brand: MEDLINE

## (undated) DEVICE — DECANTER BAG 9": Brand: MEDLINE INDUSTRIES, INC.

## (undated) DEVICE — SPNG LAP 18X18IN LF STRL PK/5

## (undated) DEVICE — BNDG ELAS ELITE V/CLOSE 6IN 5YD LF STRL

## (undated) DEVICE — SUT MNCRYL 3/0 PS2 18IN MCP497G

## (undated) DEVICE — SUT VIC 2/0 SH 27IN

## (undated) DEVICE — SUT MNCRYL PLS ANTIB UD 4/0 PS2 18IN

## (undated) DEVICE — ELECTRD BLD EZ CLN MOD XLNG 2.75IN

## (undated) DEVICE — APPL CHLORAPREP 26ML CLR

## (undated) DEVICE — SYR LUERLOK 30CC

## (undated) DEVICE — LOU MINOR PROCEDURE: Brand: MEDLINE INDUSTRIES, INC.

## (undated) DEVICE — PK UNIV COMPL 40

## (undated) DEVICE — PROXIMATE RH ROTATING HEAD SKIN STAPLERS (35 WIDE) CONTAINS 35 STAINLESS STEEL STAPLES: Brand: PROXIMATE

## (undated) DEVICE — SYRINGE, LUER LOCK, 60ML: Brand: MEDLINE

## (undated) DEVICE — TRAP FLD MINIVAC MEGADYNE 100ML

## (undated) DEVICE — INTENDED FOR TISSUE SEPARATION, AND OTHER PROCEDURES THAT REQUIRE A SHARP SURGICAL BLADE TO PUNCTURE OR CUT.: Brand: BARD-PARKER ® CARBON RIB-BACK BLADES

## (undated) DEVICE — STPCK 3WY D201 DISCOFIX

## (undated) DEVICE — PENCL E/S ULTRAVAC TELESCP NOSE HOLSTR 10FT

## (undated) DEVICE — BANDAGE,GAUZE,BULKEE II,4.5"X4.1YD,STRL: Brand: MEDLINE

## (undated) DEVICE — SOL NS 500ML

## (undated) DEVICE — SOL NACL 0.9PCT 100ML SGL

## (undated) DEVICE — APPL CHLORAPREP HI/LITE 26ML ORNG

## (undated) DEVICE — ELECTRD BLD EZ CLN MOD 6.5IN

## (undated) DEVICE — SYR LL TP 10ML STRL

## (undated) DEVICE — NEEDLE, QUINCKE 22GX3.5": Brand: MEDLINE INDUSTRIES, INC.

## (undated) DEVICE — 3M™ IOBAN™ 2 ANTIMICROBIAL INCISE DRAPE 6650EZ: Brand: IOBAN™ 2

## (undated) DEVICE — TOWEL,OR,DSP,ST,BLUE,STD,4/PK,20PK/CS: Brand: MEDLINE

## (undated) DEVICE — SUT ETHLN 4/0 PS2 PLSTC 1667G

## (undated) DEVICE — ANTIBACTERIAL UNDYED BRAIDED (POLYGLACTIN 910), SYNTHETIC ABSORBABLE SUTURE: Brand: COATED VICRYL

## (undated) DEVICE — Device

## (undated) DEVICE — NEEDLE, QUINCKE, 20GX3.5": Brand: MEDLINE

## (undated) DEVICE — GLV SURG BIOGEL LTX PF 8 1/2

## (undated) DEVICE — IRRIGATOR BULB ASEPTO 60CC STRL

## (undated) DEVICE — SUT PDS 2/0 SH 27IN Z317H

## (undated) DEVICE — SYRINGE,TOOMEY,IRRIGATION,70CC,STERILE: Brand: MEDLINE

## (undated) DEVICE — 1010 S-DRAPE TOWEL DRAPE 10/BX: Brand: STERI-DRAPE™

## (undated) DEVICE — SMOKE EVACUATION TUBING WITH 7/8 IN TO 1/4 IN REDUCER: Brand: BUFFALO FILTER

## (undated) DEVICE — SOL LR 1000ML

## (undated) DEVICE — SYS FAT GRAFTING REVOLVE SGL

## (undated) DEVICE — ADHS SKIN SURG TISS VISC PREMIERPRO EXOFIN HI/VISC FAST/DRY

## (undated) DEVICE — PREP SOL POVIDONE/IODINE BT 4OZ

## (undated) DEVICE — DRSNG SURESITE WNDW 4X4.5

## (undated) DEVICE — TOTAL TRAY, 16FR 10ML SIL FOLEY, URN: Brand: MEDLINE

## (undated) DEVICE — DISPOSABLE BIPOLAR CABLE 12FT. (3.6M): Brand: KIRWAN

## (undated) DEVICE — PATIENT RETURN ELECTRODE, SINGLE-USE, CONTACT QUALITY MONITORING, ADULT, WITH 9FT CORD, FOR PATIENTS WEIGING OVER 33LBS. (15KG): Brand: MEGADYNE

## (undated) DEVICE — LINER CANSTR SXN QUICKFIT 3000CC

## (undated) DEVICE — DECANT BG O JET

## (undated) DEVICE — DEV DEL BRST/IMP GEL KELLER2 FUNNEL EA/5

## (undated) DEVICE — GLV SURG BIOGEL LTX PF 7

## (undated) DEVICE — SUT ETHLN 3/0 PS1 18IN 1663H

## (undated) DEVICE — STPLR SKIN VISISTAT WD 35CT

## (undated) DEVICE — SPNG GZ WOVN 4X4IN 12PLY 10/BX STRL

## (undated) DEVICE — TBG INFILTRATION CB 156IN

## (undated) DEVICE — NDL HYPO PRECISIONGLIDE REG 25G 1 1/2

## (undated) DEVICE — JACKSON-PRATT 100CC BULB RESERVOIR: Brand: CARDINAL HEALTH

## (undated) DEVICE — NDL FLTR 19G 1 1/2 LF

## (undated) DEVICE — GAUZE,SPONGE,FLUFF,6"X6.75",STRL,10/TRAY: Brand: MEDLINE

## (undated) DEVICE — ADHS SKIN PREMIERPRO EXOFIN TOPICAL HI/VISC .5ML

## (undated) DEVICE — CVR TRANSD CIV FLX TPR 11.9 TO 3.8X61CM

## (undated) DEVICE — SUT VIC 3/0 TIES 18IN J110T

## (undated) DEVICE — INTENDED FOR TISSUE SEPARATION, AND OTHER PROCEDURES THAT REQUIRE A SHARP SURGICAL BLADE TO PUNCTURE OR CUT.: Brand: BARD-PARKER ® STAINLESS STEEL BLADES

## (undated) DEVICE — BIOPATCH™ ANTIMICROBIAL DRESSING WITH CHLORHEXIDINE GLUCONATE IS A HYDROPHILLIC POLYURETHANE ABSORPTIVE FOAM WITH CHLORHEXIDINE GLUCONATE (CHG) WHICH INHIBITS BACTERIAL GROWTH UNDER THE DRESSING. THE DRESSING IS INTENDED TO BE USED TO ABSORB EXUDATE, COVER A WOUND CAUSED BY VASCULAR AND NONVASCULAR PERCUTANEOUS MEDICAL DEVICES DURING SURGERY, AS WELL AS REDUCE LOCAL INFECTION AND COLONIZATION OF MICROORGANISMS.: Brand: BIOPATCH

## (undated) DEVICE — SKIN PREP TRAY W/CHG: Brand: MEDLINE INDUSTRIES, INC.

## (undated) DEVICE — TBG SXN LIPO 3/8IDX5/8IN ODX10FT DISP

## (undated) DEVICE — PK UNIV PLSTC 40

## (undated) DEVICE — SZR BRST SALN STL68 H/P 650/700CC

## (undated) DEVICE — TBG PENCL TELESCP MEGADYNE SMOKE EVAC 10FT

## (undated) DEVICE — CONN TBG Y 5 IN 1 LF STRL

## (undated) DEVICE — ST IV PRI VENOSET NV W/CLAMP 72IN

## (undated) DEVICE — LUER-LOK 360°: Brand: CONNECTA, LUER-LOK

## (undated) DEVICE — VIOLET BRAIDED (POLYGLACTIN 910), SYNTHETIC ABSORBABLE SUTURE: Brand: COATED VICRYL

## (undated) DEVICE — 3M™ IOBAN™ 2 ANTIMICROBIAL INCISE DRAPE 6648EZ: Brand: IOBAN™ 2